# Patient Record
Sex: MALE | NOT HISPANIC OR LATINO | Employment: UNEMPLOYED | URBAN - METROPOLITAN AREA
[De-identification: names, ages, dates, MRNs, and addresses within clinical notes are randomized per-mention and may not be internally consistent; named-entity substitution may affect disease eponyms.]

---

## 2024-10-15 ENCOUNTER — APPOINTMENT (OUTPATIENT)
Dept: RADIOLOGY | Facility: HOSPITAL | Age: 64
End: 2024-10-15
Payer: COMMERCIAL

## 2024-10-15 ENCOUNTER — HOSPITAL ENCOUNTER (EMERGENCY)
Facility: HOSPITAL | Age: 64
Discharge: HOME | End: 2024-10-16
Attending: EMERGENCY MEDICINE
Payer: COMMERCIAL

## 2024-10-15 ENCOUNTER — APPOINTMENT (OUTPATIENT)
Dept: CARDIOLOGY | Facility: HOSPITAL | Age: 64
End: 2024-10-15
Payer: COMMERCIAL

## 2024-10-15 VITALS
DIASTOLIC BLOOD PRESSURE: 86 MMHG | TEMPERATURE: 98.4 F | RESPIRATION RATE: 20 BRPM | HEIGHT: 74 IN | HEART RATE: 96 BPM | SYSTOLIC BLOOD PRESSURE: 148 MMHG | OXYGEN SATURATION: 94 % | BODY MASS INDEX: 43.65 KG/M2

## 2024-10-15 DIAGNOSIS — R56.9 SEIZURE (MULTI): Primary | ICD-10-CM

## 2024-10-15 DIAGNOSIS — G40.919 BREAKTHROUGH SEIZURE (MULTI): ICD-10-CM

## 2024-10-15 LAB
ALBUMIN SERPL BCP-MCNC: 4 G/DL (ref 3.4–5)
ALP SERPL-CCNC: 112 U/L (ref 33–136)
ALT SERPL W P-5'-P-CCNC: 75 U/L (ref 10–52)
ANION GAP SERPL CALC-SCNC: 24 MMOL/L (ref 10–20)
AST SERPL W P-5'-P-CCNC: 51 U/L (ref 9–39)
BASOPHILS # BLD AUTO: 0.03 X10*3/UL (ref 0–0.1)
BASOPHILS NFR BLD AUTO: 0.3 %
BILIRUB SERPL-MCNC: 1.4 MG/DL (ref 0–1.2)
BUN SERPL-MCNC: 6 MG/DL (ref 6–23)
CALCIUM SERPL-MCNC: 8.5 MG/DL (ref 8.6–10.3)
CHLORIDE SERPL-SCNC: 98 MMOL/L (ref 98–107)
CO2 SERPL-SCNC: 17 MMOL/L (ref 21–32)
CREAT SERPL-MCNC: 0.9 MG/DL (ref 0.5–1.3)
EGFRCR SERPLBLD CKD-EPI 2021: >90 ML/MIN/1.73M*2
EOSINOPHIL # BLD AUTO: 0.03 X10*3/UL (ref 0–0.7)
EOSINOPHIL NFR BLD AUTO: 0.3 %
ERYTHROCYTE [DISTWIDTH] IN BLOOD BY AUTOMATED COUNT: 12 % (ref 11.5–14.5)
ETHANOL SERPL-MCNC: <10 MG/DL
GLUCOSE SERPL-MCNC: 166 MG/DL (ref 74–99)
HCT VFR BLD AUTO: 43.9 % (ref 41–52)
HGB BLD-MCNC: 15.4 G/DL (ref 13.5–17.5)
HOLD SPECIMEN: NORMAL
IMM GRANULOCYTES # BLD AUTO: 0.02 X10*3/UL (ref 0–0.7)
IMM GRANULOCYTES NFR BLD AUTO: 0.2 % (ref 0–0.9)
INR PPP: 1.2 (ref 0.9–1.1)
LACTATE SERPL-SCNC: 2.3 MMOL/L (ref 0.4–2)
LACTATE SERPL-SCNC: 8.4 MMOL/L (ref 0.4–2)
LYMPHOCYTES # BLD AUTO: 2.24 X10*3/UL (ref 1.2–4.8)
LYMPHOCYTES NFR BLD AUTO: 23.4 %
MAGNESIUM SERPL-MCNC: 1.42 MG/DL (ref 1.6–2.4)
MCH RBC QN AUTO: 31.2 PG (ref 26–34)
MCHC RBC AUTO-ENTMCNC: 35.1 G/DL (ref 32–36)
MCV RBC AUTO: 89 FL (ref 80–100)
MONOCYTES # BLD AUTO: 0.48 X10*3/UL (ref 0.1–1)
MONOCYTES NFR BLD AUTO: 5 %
NEUTROPHILS # BLD AUTO: 6.77 X10*3/UL (ref 1.2–7.7)
NEUTROPHILS NFR BLD AUTO: 70.8 %
NRBC BLD-RTO: 0 /100 WBCS (ref 0–0)
PLATELET # BLD AUTO: 228 X10*3/UL (ref 150–450)
POTASSIUM SERPL-SCNC: 2.6 MMOL/L (ref 3.5–5.3)
PROT SERPL-MCNC: 7.2 G/DL (ref 6.4–8.2)
PROTHROMBIN TIME: 14 SECONDS (ref 9.8–12.8)
RBC # BLD AUTO: 4.94 X10*6/UL (ref 4.5–5.9)
SODIUM SERPL-SCNC: 136 MMOL/L (ref 136–145)
WBC # BLD AUTO: 9.6 X10*3/UL (ref 4.4–11.3)

## 2024-10-15 PROCEDURE — 2500000002 HC RX 250 W HCPCS SELF ADMINISTERED DRUGS (ALT 637 FOR MEDICARE OP, ALT 636 FOR OP/ED): Performed by: EMERGENCY MEDICINE

## 2024-10-15 PROCEDURE — 85025 COMPLETE CBC W/AUTO DIFF WBC: CPT | Performed by: EMERGENCY MEDICINE

## 2024-10-15 PROCEDURE — 70450 CT HEAD/BRAIN W/O DYE: CPT | Performed by: RADIOLOGY

## 2024-10-15 PROCEDURE — 93005 ELECTROCARDIOGRAM TRACING: CPT

## 2024-10-15 PROCEDURE — 71045 X-RAY EXAM CHEST 1 VIEW: CPT | Performed by: STUDENT IN AN ORGANIZED HEALTH CARE EDUCATION/TRAINING PROGRAM

## 2024-10-15 PROCEDURE — 36415 COLL VENOUS BLD VENIPUNCTURE: CPT | Performed by: EMERGENCY MEDICINE

## 2024-10-15 PROCEDURE — 71045 X-RAY EXAM CHEST 1 VIEW: CPT

## 2024-10-15 PROCEDURE — 96365 THER/PROPH/DIAG IV INF INIT: CPT

## 2024-10-15 PROCEDURE — 2500000004 HC RX 250 GENERAL PHARMACY W/ HCPCS (ALT 636 FOR OP/ED): Performed by: EMERGENCY MEDICINE

## 2024-10-15 PROCEDURE — 96368 THER/DIAG CONCURRENT INF: CPT

## 2024-10-15 PROCEDURE — 96366 THER/PROPH/DIAG IV INF ADDON: CPT

## 2024-10-15 PROCEDURE — 82077 ASSAY SPEC XCP UR&BREATH IA: CPT | Performed by: EMERGENCY MEDICINE

## 2024-10-15 PROCEDURE — 80053 COMPREHEN METABOLIC PANEL: CPT | Performed by: EMERGENCY MEDICINE

## 2024-10-15 PROCEDURE — 83735 ASSAY OF MAGNESIUM: CPT | Performed by: EMERGENCY MEDICINE

## 2024-10-15 PROCEDURE — 83605 ASSAY OF LACTIC ACID: CPT | Performed by: EMERGENCY MEDICINE

## 2024-10-15 PROCEDURE — 99285 EMERGENCY DEPT VISIT HI MDM: CPT

## 2024-10-15 PROCEDURE — 85610 PROTHROMBIN TIME: CPT | Performed by: EMERGENCY MEDICINE

## 2024-10-15 PROCEDURE — 70450 CT HEAD/BRAIN W/O DYE: CPT

## 2024-10-15 RX ORDER — POTASSIUM CHLORIDE 14.9 MG/ML
20 INJECTION INTRAVENOUS ONCE
Status: COMPLETED | OUTPATIENT
Start: 2024-10-15 | End: 2024-10-16

## 2024-10-15 RX ORDER — MAGNESIUM SULFATE HEPTAHYDRATE 40 MG/ML
2 INJECTION, SOLUTION INTRAVENOUS ONCE
Status: COMPLETED | OUTPATIENT
Start: 2024-10-15 | End: 2024-10-15

## 2024-10-15 RX ORDER — POTASSIUM CHLORIDE 20 MEQ/1
40 TABLET, EXTENDED RELEASE ORAL ONCE
Status: COMPLETED | OUTPATIENT
Start: 2024-10-15 | End: 2024-10-15

## 2024-10-15 ASSESSMENT — LIFESTYLE VARIABLES
TOTAL SCORE: 4
HAVE PEOPLE ANNOYED YOU BY CRITICIZING YOUR DRINKING: YES
EVER FELT BAD OR GUILTY ABOUT YOUR DRINKING: YES
HAVE YOU EVER FELT YOU SHOULD CUT DOWN ON YOUR DRINKING: YES
EVER HAD A DRINK FIRST THING IN THE MORNING TO STEADY YOUR NERVES TO GET RID OF A HANGOVER: YES

## 2024-10-15 ASSESSMENT — PAIN SCALES - GENERAL
PAINLEVEL_OUTOF10: 0 - NO PAIN
PAINLEVEL_OUTOF10: 0 - NO PAIN

## 2024-10-15 ASSESSMENT — PAIN - FUNCTIONAL ASSESSMENT: PAIN_FUNCTIONAL_ASSESSMENT: 0-10

## 2024-10-16 LAB
ATRIAL RATE: 246 BPM
P AXIS: 56 DEGREES
PR INTERVAL: 177 MS
Q ONSET: 252 MS
QRS COUNT: 16 BEATS
QRS DURATION: 111 MS
QT INTERVAL: 363 MS
QTC CALCULATION(BAZETT): 464 MS
QTC FREDERICIA: 427 MS
R AXIS: 83 DEGREES
T AXIS: -87 DEGREES
T OFFSET: 433 MS
VENTRICULAR RATE: 98 BPM

## 2024-10-16 PROCEDURE — 96366 THER/PROPH/DIAG IV INF ADDON: CPT

## 2024-10-16 NOTE — ED PROVIDER NOTES
HPI   Chief Complaint   Patient presents with    Seizures       64-year-old male who presents by squad for seizure.  Patient has a known history of seizures on Keppra also history of alcohol abuse.  According the squad he was clean for some time but started drinking again over the past week.  Seizure was witnessed by family who called the squad.  Patient states he has been taking his Keppra as prescribed.  He denies any alcohol use.  Denies any drug use.              Patient History   No past medical history on file.  No past surgical history on file.  No family history on file.  Social History     Tobacco Use    Smoking status: Not on file    Smokeless tobacco: Not on file   Substance Use Topics    Alcohol use: Not on file    Drug use: Not on file       Physical Exam   ED Triage Vitals [10/15/24 2021]   Temperature Heart Rate Respirations BP   36.9 °C (98.4 °F) (!) 109 (!) 22 150/85      Pulse Ox Temp src Heart Rate Source Patient Position   97 % -- -- --      BP Location FiO2 (%)     -- --       Physical Exam  Constitutional:       Appearance: Normal appearance. He is normal weight.   HENT:      Head: Normocephalic and atraumatic.      Nose: Nose normal.      Mouth/Throat:      Mouth: Mucous membranes are moist.      Pharynx: Oropharynx is clear.   Eyes:      Extraocular Movements: Extraocular movements intact.      Conjunctiva/sclera: Conjunctivae normal.      Pupils: Pupils are equal, round, and reactive to light.   Cardiovascular:      Rate and Rhythm: Normal rate and regular rhythm.   Pulmonary:      Effort: Pulmonary effort is normal.      Breath sounds: Normal breath sounds.   Abdominal:      General: Abdomen is flat. Bowel sounds are normal.      Palpations: Abdomen is soft.   Musculoskeletal:         General: Normal range of motion.      Cervical back: Normal range of motion and neck supple.   Skin:     General: Skin is warm and dry.      Capillary Refill: Capillary refill takes less than 2 seconds.    Neurological:      General: No focal deficit present.      Mental Status: He is alert. He is disoriented.   Psychiatric:         Mood and Affect: Mood normal.         Behavior: Behavior normal.         Thought Content: Thought content normal.         Judgment: Judgment normal.       Labs Reviewed   COMPREHENSIVE METABOLIC PANEL - Abnormal       Result Value    Glucose 166 (*)     Sodium 136      Potassium 2.6 (*)     Chloride 98      Bicarbonate 17 (*)     Anion Gap 24 (*)     Urea Nitrogen 6      Creatinine 0.90      eGFR >90      Calcium 8.5 (*)     Albumin 4.0      Alkaline Phosphatase 112      Total Protein 7.2      AST 51 (*)     Bilirubin, Total 1.4 (*)     ALT 75 (*)    MAGNESIUM - Abnormal    Magnesium 1.42 (*)    LACTATE - Abnormal    Lactate 8.4 (*)     Narrative:     Venipuncture immediately after or during the administration of Metamizole may lead to falsely low results. Testing should be performed immediately prior to Metamizole dosing.   PROTIME-INR - Abnormal    Protime 14.0 (*)     INR 1.2 (*)    LACTATE - Abnormal    Lactate 2.3 (*)     Narrative:     Venipuncture immediately after or during the administration of Metamizole may lead to falsely low results. Testing should be performed immediately prior to Metamizole dosing.   ALCOHOL - Normal    Alcohol <10     CBC WITH AUTO DIFFERENTIAL    WBC 9.6      nRBC 0.0      RBC 4.94      Hemoglobin 15.4      Hematocrit 43.9      MCV 89      MCH 31.2      MCHC 35.1      RDW 12.0      Platelets 228      Neutrophils % 70.8      Immature Granulocytes %, Automated 0.2      Lymphocytes % 23.4      Monocytes % 5.0      Eosinophils % 0.3      Basophils % 0.3      Neutrophils Absolute 6.77      Immature Granulocytes Absolute, Automated 0.02      Lymphocytes Absolute 2.24      Monocytes Absolute 0.48      Eosinophils Absolute 0.03      Basophils Absolute 0.03     URINALYSIS WITH REFLEX CULTURE AND MICROSCOPIC    Narrative:     The following orders were created for  panel order Urinalysis with Reflex Culture and Microscopic.  Procedure                               Abnormality         Status                     ---------                               -----------         ------                     Urinalysis with Reflex C...[[id number]]                                                 Extra Urine Gray Tube[[id number]]                                                         Please view results for these tests on the individual orders.   URINALYSIS WITH REFLEX CULTURE AND MICROSCOPIC   EXTRA URINE GRAY TUBE       CT head wo IV contrast   Final Result   No acute intracranial abnormality.        Chronic changes as noted above.        MACRO:   None        Signed by: Aysha Paulson 10/15/2024 10:51 PM   Dictation workstation:   [id number]      XR chest 1 view   Final Result   1.  Mild pulmonary vascular congestion without consolidation or   pleural effusion.                  MACRO:   None        Signed by: Radha Ozuna 10/15/2024 9:23 PM   Dictation workstation:   [id number]              ED Course & MDM   Diagnoses as of 10/15/24 2358   Seizure (Multi)   Breakthrough seizure (Multi)                 No data recorded     Lilibeth Coma Scale Score: 15 (10/15/24 2349 : Mary Woodward RN)                           Medical Decision Making  Emergency department course, EKG was obtained showed A-fib at a rate of 98 patient does have a history of A-fib.  Laboratory studies were obtained and reviewed patient low potassium at 2.6 with a low magnesium.  Initial lactate was 8 after IV fluids repeat lactate is 2.3.  Potassium and magnesium was replaced IV.  CT of the head shows no acute intercranial findings.  Chest x-ray shows some mild vascular congestion.  Patient is neurologically intact.  He states he has been taking his Keppra as prescribed.  I feel comfortable discharging the patient home.  Patient is agreeable with this plan.    Amount and/or Complexity of Data  Reviewed  ECG/medicine tests: independent interpretation performed.     Details: EKG shows A-fib at a rate of 98 with nonspecific ST-T wave changes, interpreted by ED physician.        Procedure  Procedures     Romina Cormire DO  10/15/24 6601

## 2024-10-16 NOTE — ED TRIAGE NOTES
Witnessed seizure. Arrives awake and oriented times 3 but is unable to say what happened and is not able to answer all questions. Still has some confusion. Per report he had been sober but started to drink about a week ago . Unable to say when last drink was.

## 2025-04-15 ENCOUNTER — APPOINTMENT (OUTPATIENT)
Dept: CARDIOLOGY | Facility: HOSPITAL | Age: 65
End: 2025-04-15
Payer: COMMERCIAL

## 2025-04-15 ENCOUNTER — APPOINTMENT (OUTPATIENT)
Dept: RADIOLOGY | Facility: HOSPITAL | Age: 65
End: 2025-04-15
Payer: COMMERCIAL

## 2025-04-15 ENCOUNTER — DOCUMENTATION (OUTPATIENT)
Dept: CRITICAL CARE MEDICINE | Facility: HOSPITAL | Age: 65
End: 2025-04-15

## 2025-04-15 ENCOUNTER — HOSPITAL ENCOUNTER (INPATIENT)
Facility: HOSPITAL | Age: 65
LOS: 2 days | Discharge: ANOTHER HOSPITAL | End: 2025-04-17
Attending: EMERGENCY MEDICINE | Admitting: INTERNAL MEDICINE
Payer: COMMERCIAL

## 2025-04-15 DIAGNOSIS — I48.91 UNSPECIFIED ATRIAL FIBRILLATION (MULTI): ICD-10-CM

## 2025-04-15 DIAGNOSIS — R56.9 SEIZURE (MULTI): Primary | ICD-10-CM

## 2025-04-15 DIAGNOSIS — I47.20 VENTRICULAR TACHYCARDIA, UNSPECIFIED (MULTI): ICD-10-CM

## 2025-04-15 DIAGNOSIS — R45.1 AGITATION: ICD-10-CM

## 2025-04-15 PROBLEM — F10.90 ALCOHOL USE DISORDER: Status: ACTIVE | Noted: 2025-04-15

## 2025-04-15 PROBLEM — E66.01 MORBID OBESITY WITH BMI OF 40.0-44.9, ADULT (MULTI): Status: ACTIVE | Noted: 2025-04-15

## 2025-04-15 PROBLEM — I10 HTN (HYPERTENSION): Chronic | Status: ACTIVE | Noted: 2025-04-15

## 2025-04-15 PROBLEM — F32.A DEPRESSION, UNSPECIFIED: Status: ACTIVE | Noted: 2025-04-15

## 2025-04-15 PROBLEM — G89.29 CHRONIC LOWER BACK PAIN: Status: ACTIVE | Noted: 2025-04-15

## 2025-04-15 PROBLEM — I48.19 ATRIAL FIBRILLATION, PERSISTENT (MULTI): Status: ACTIVE | Noted: 2025-04-15

## 2025-04-15 PROBLEM — Z87.898 HISTORY OF PREDIABETES: Chronic | Status: ACTIVE | Noted: 2025-04-15

## 2025-04-15 PROBLEM — M54.50 CHRONIC LOWER BACK PAIN: Status: ACTIVE | Noted: 2025-04-15

## 2025-04-15 PROBLEM — Z72.0 NICOTINE USE: Chronic | Status: ACTIVE | Noted: 2025-04-15

## 2025-04-15 PROBLEM — G47.33 OSA (OBSTRUCTIVE SLEEP APNEA): Status: ACTIVE | Noted: 2025-04-15

## 2025-04-15 PROBLEM — F41.1 GENERALIZED ANXIETY DISORDER: Chronic | Status: ACTIVE | Noted: 2025-04-15

## 2025-04-15 PROBLEM — F10.931 ALCOHOL WITHDRAWAL DELIRIUM, ACUTE, HYPERACTIVE (MULTI): Status: ACTIVE | Noted: 2025-04-15

## 2025-04-15 PROBLEM — E78.5 HLD (HYPERLIPIDEMIA): Status: ACTIVE | Noted: 2025-04-15

## 2025-04-15 LAB
ALBUMIN SERPL BCP-MCNC: 3.9 G/DL (ref 3.4–5)
ALBUMIN SERPL BCP-MCNC: 3.9 G/DL (ref 3.4–5)
ALP SERPL-CCNC: 142 U/L (ref 33–136)
ALP SERPL-CCNC: 143 U/L (ref 33–136)
ALT SERPL W P-5'-P-CCNC: 20 U/L (ref 10–52)
ALT SERPL W P-5'-P-CCNC: 21 U/L (ref 10–52)
AMPHETAMINES UR QL SCN: ABNORMAL
ANION GAP BLDA CALCULATED.4IONS-SCNC: 10 MMO/L (ref 10–25)
ANION GAP BLDA CALCULATED.4IONS-SCNC: 15 MMO/L (ref 10–25)
ANION GAP SERPL CALC-SCNC: 16 MMOL/L (ref 10–20)
ANION GAP SERPL CALC-SCNC: 18 MMOL/L (ref 10–20)
AORTIC VALVE PEAK VELOCITY: 1.41 M/S
APPEARANCE UR: CLEAR
APTT PPP: 35 SECONDS (ref 26–36)
ARTERIAL PATENCY WRIST A: POSITIVE
ARTERIAL PATENCY WRIST A: POSITIVE
AST SERPL W P-5'-P-CCNC: 31 U/L (ref 9–39)
AST SERPL W P-5'-P-CCNC: 38 U/L (ref 9–39)
AV PEAK GRADIENT: 8 MMHG
AVA (PEAK VEL): 3.24 CM2
BARBITURATES UR QL SCN: ABNORMAL
BASE EXCESS BLDA CALC-SCNC: -1.9 MMOL/L (ref -2–3)
BASE EXCESS BLDA CALC-SCNC: 1.6 MMOL/L (ref -2–3)
BASOPHILS # BLD AUTO: 0.02 X10*3/UL (ref 0–0.1)
BASOPHILS NFR BLD AUTO: 0.3 %
BENZODIAZ UR QL SCN: ABNORMAL
BILIRUB SERPL-MCNC: 0.8 MG/DL (ref 0–1.2)
BILIRUB SERPL-MCNC: 1 MG/DL (ref 0–1.2)
BILIRUB UR STRIP.AUTO-MCNC: NEGATIVE MG/DL
BNP SERPL-MCNC: 159 PG/ML (ref 0–99)
BODY TEMPERATURE: 37 DEGREES CELSIUS
BODY TEMPERATURE: 37 DEGREES CELSIUS
BUN SERPL-MCNC: 5 MG/DL (ref 6–23)
BUN SERPL-MCNC: 7 MG/DL (ref 6–23)
BZE UR QL SCN: ABNORMAL
CA-I BLDA-SCNC: 1.03 MMOL/L (ref 1.1–1.33)
CA-I BLDA-SCNC: 1.08 MMOL/L (ref 1.1–1.33)
CALCIUM SERPL-MCNC: 8.5 MG/DL (ref 8.6–10.3)
CALCIUM SERPL-MCNC: 8.5 MG/DL (ref 8.6–10.3)
CANNABINOIDS UR QL SCN: ABNORMAL
CARDIAC TROPONIN I PNL SERPL HS: 241 NG/L (ref 0–20)
CARDIAC TROPONIN I PNL SERPL HS: 290 NG/L (ref 0–20)
CHLORIDE BLDA-SCNC: 95 MMOL/L (ref 98–107)
CHLORIDE BLDA-SCNC: 97 MMOL/L (ref 98–107)
CHLORIDE SERPL-SCNC: 94 MMOL/L (ref 98–107)
CHLORIDE SERPL-SCNC: 95 MMOL/L (ref 98–107)
CO2 SERPL-SCNC: 26 MMOL/L (ref 21–32)
CO2 SERPL-SCNC: 27 MMOL/L (ref 21–32)
COLOR UR: ABNORMAL
CREAT SERPL-MCNC: 0.6 MG/DL (ref 0.5–1.3)
CREAT SERPL-MCNC: 1.23 MG/DL (ref 0.5–1.3)
CRITICAL CALL TIME: 445
CRITICAL CALLED BY: ABNORMAL
CRITICAL CALLED TO: ABNORMAL
CRITICAL READ BACK: ABNORMAL
EGFRCR SERPLBLD CKD-EPI 2021: 66 ML/MIN/1.73M*2
EGFRCR SERPLBLD CKD-EPI 2021: >90 ML/MIN/1.73M*2
EJECTION FRACTION APICAL 4 CHAMBER: 64.6
EJECTION FRACTION: 53 %
EOSINOPHIL # BLD AUTO: 0 X10*3/UL (ref 0–0.7)
EOSINOPHIL NFR BLD AUTO: 0 %
ERYTHROCYTE [DISTWIDTH] IN BLOOD BY AUTOMATED COUNT: 14.9 % (ref 11.5–14.5)
ETHANOL SERPL-MCNC: <10 MG/DL
FENTANYL+NORFENTANYL UR QL SCN: ABNORMAL
FLOW: 60 LPM
FLUAV RNA RESP QL NAA+PROBE: NOT DETECTED
FLUBV RNA RESP QL NAA+PROBE: NOT DETECTED
FREQUENCY (BPM): 20 BPM
FREQUENCY (BPM): 20 BPM
GLUCOSE BLD MANUAL STRIP-MCNC: 200 MG/DL (ref 74–99)
GLUCOSE BLDA-MCNC: 169 MG/DL (ref 74–99)
GLUCOSE BLDA-MCNC: 229 MG/DL (ref 74–99)
GLUCOSE SERPL-MCNC: 125 MG/DL (ref 74–99)
GLUCOSE SERPL-MCNC: 139 MG/DL (ref 74–99)
GLUCOSE UR STRIP.AUTO-MCNC: NORMAL MG/DL
HCO3 BLDA-SCNC: 27.2 MMOL/L (ref 22–26)
HCO3 BLDA-SCNC: 29.5 MMOL/L (ref 22–26)
HCT VFR BLD AUTO: 41.9 % (ref 41–52)
HCT VFR BLD EST: 42 % (ref 41–52)
HCT VFR BLD EST: 43 % (ref 41–52)
HGB BLD-MCNC: 13.6 G/DL (ref 13.5–17.5)
HGB BLDA-MCNC: 14 G/DL (ref 13.5–17.5)
HGB BLDA-MCNC: 14.4 G/DL (ref 13.5–17.5)
HOLD SPECIMEN: NORMAL
IMM GRANULOCYTES # BLD AUTO: 0.02 X10*3/UL (ref 0–0.7)
IMM GRANULOCYTES NFR BLD AUTO: 0.3 % (ref 0–0.9)
INHALED O2 CONCENTRATION: 100 %
INHALED O2 CONCENTRATION: 100 %
INR PPP: 1.4 (ref 0.9–1.1)
KETONES UR STRIP.AUTO-MCNC: ABNORMAL MG/DL
LACTATE BLDA-SCNC: 2 MMOL/L (ref 0.4–2)
LACTATE BLDA-SCNC: 7.6 MMOL/L (ref 0.4–2)
LACTATE SERPL-SCNC: 2.7 MMOL/L (ref 0.4–2)
LACTATE SERPL-SCNC: 3.1 MMOL/L (ref 0.4–2)
LEFT ATRIUM VOLUME AREA LENGTH INDEX BSA: 20 ML/M2
LEFT VENTRICLE INTERNAL DIMENSION DIASTOLE: 3.81 CM (ref 3.5–6)
LEFT VENTRICULAR OUTFLOW TRACT DIAMETER: 2.3 CM
LEUKOCYTE ESTERASE UR QL STRIP.AUTO: NEGATIVE
LEVETIRACETAM SERPL-MCNC: 37 UG/ML (ref 10–40)
LEVETIRACETAM SERPL-MCNC: 78 UG/ML (ref 10–40)
LYMPHOCYTES # BLD AUTO: 0.67 X10*3/UL (ref 1.2–4.8)
LYMPHOCYTES NFR BLD AUTO: 11.7 %
MAGNESIUM SERPL-MCNC: 1.73 MG/DL (ref 1.6–2.4)
MCH RBC QN AUTO: 29.5 PG (ref 26–34)
MCHC RBC AUTO-ENTMCNC: 32.5 G/DL (ref 32–36)
MCV RBC AUTO: 91 FL (ref 80–100)
METHADONE UR QL SCN: ABNORMAL
MONOCYTES # BLD AUTO: 0.25 X10*3/UL (ref 0.1–1)
MONOCYTES NFR BLD AUTO: 4.4 %
MUCOUS THREADS #/AREA URNS AUTO: NORMAL /LPF
NEUTROPHILS # BLD AUTO: 4.76 X10*3/UL (ref 1.2–7.7)
NEUTROPHILS NFR BLD AUTO: 83.3 %
NITRITE UR QL STRIP.AUTO: NEGATIVE
NRBC BLD-RTO: 0.3 /100 WBCS (ref 0–0)
OPIATES UR QL SCN: ABNORMAL
OXYCODONE+OXYMORPHONE UR QL SCN: ABNORMAL
OXYHGB MFR BLDA: 94.9 % (ref 94–98)
OXYHGB MFR BLDA: 96.8 % (ref 94–98)
PCO2 BLDA: 60 MM HG (ref 38–42)
PCO2 BLDA: 65 MM HG (ref 38–42)
PCP UR QL SCN: ABNORMAL
PEAK PRESSURE: 23 CM H2O
PEEP CMH2O: 8 CM H2O
PEEP CMH2O: 8 CM H2O
PH BLDA: 7.23 PH (ref 7.38–7.42)
PH BLDA: 7.3 PH (ref 7.38–7.42)
PH UR STRIP.AUTO: 8 [PH]
PLATELET # BLD AUTO: 213 X10*3/UL (ref 150–450)
PO2 BLDA: 117 MM HG (ref 85–95)
PO2 BLDA: 94 MM HG (ref 85–95)
POTASSIUM BLDA-SCNC: 3.4 MMOL/L (ref 3.5–5.3)
POTASSIUM BLDA-SCNC: 3.6 MMOL/L (ref 3.5–5.3)
POTASSIUM SERPL-SCNC: 3.1 MMOL/L (ref 3.5–5.3)
POTASSIUM SERPL-SCNC: 3.6 MMOL/L (ref 3.5–5.3)
PROT SERPL-MCNC: 7.1 G/DL (ref 6.4–8.2)
PROT SERPL-MCNC: 7.8 G/DL (ref 6.4–8.2)
PROT UR STRIP.AUTO-MCNC: ABNORMAL MG/DL
PROTHROMBIN TIME: 16 SECONDS (ref 9.8–12.4)
RBC # BLD AUTO: 4.61 X10*6/UL (ref 4.5–5.9)
RBC # UR STRIP.AUTO: NEGATIVE MG/DL
RBC #/AREA URNS AUTO: NORMAL /HPF
RIGHT VENTRICLE FREE WALL PEAK S': 13.3 CM/S
SAO2 % BLDA: 100 % (ref 94–100)
SAO2 % BLDA: 98 % (ref 94–100)
SARS-COV-2 RNA RESP QL NAA+PROBE: NOT DETECTED
SODIUM BLDA-SCNC: 133 MMOL/L (ref 136–145)
SODIUM BLDA-SCNC: 134 MMOL/L (ref 136–145)
SODIUM SERPL-SCNC: 134 MMOL/L (ref 136–145)
SODIUM SERPL-SCNC: 135 MMOL/L (ref 136–145)
SP GR UR STRIP.AUTO: 1.01
SPECIMEN DRAWN FROM PATIENT: ABNORMAL
SPECIMEN DRAWN FROM PATIENT: ABNORMAL
SPONTANEOUS TIDAL VOLUME: 500 ML
SPONTANEOUS TIDAL VOLUME: 500 ML
TIDAL VOLUME: 500 ML
TIDAL VOLUME: 500 ML
TOTAL MINUTE VOLUME: 11.4 LITER
TOTAL MINUTE VOLUME: 9.7 LITER
TRICUSPID ANNULAR PLANE SYSTOLIC EXCURSION: 2.1 CM
UFH PPP CHRO-ACNC: 0.6 IU/ML
UFH PPP CHRO-ACNC: 0.6 IU/ML
UFH PPP CHRO-ACNC: 0.9 IU/ML
UROBILINOGEN UR STRIP.AUTO-MCNC: NORMAL MG/DL
VENTILATOR MODE: ABNORMAL
VENTILATOR MODE: ABNORMAL
VENTILATOR RATE: 18 BPM
VENTILATOR RATE: 18 BPM
WBC # BLD AUTO: 5.7 X10*3/UL (ref 4.4–11.3)
WBC #/AREA URNS AUTO: NORMAL /HPF

## 2025-04-15 PROCEDURE — 94799 UNLISTED PULMONARY SVC/PX: CPT

## 2025-04-15 PROCEDURE — 2500000004 HC RX 250 GENERAL PHARMACY W/ HCPCS (ALT 636 FOR OP/ED): Performed by: EMERGENCY MEDICINE

## 2025-04-15 PROCEDURE — 80053 COMPREHEN METABOLIC PANEL: CPT | Performed by: EMERGENCY MEDICINE

## 2025-04-15 PROCEDURE — 99285 EMERGENCY DEPT VISIT HI MDM: CPT | Mod: 25 | Performed by: EMERGENCY MEDICINE

## 2025-04-15 PROCEDURE — 71045 X-RAY EXAM CHEST 1 VIEW: CPT | Mod: FOREIGN READ | Performed by: RADIOLOGY

## 2025-04-15 PROCEDURE — 87081 CULTURE SCREEN ONLY: CPT | Mod: PARLAB

## 2025-04-15 PROCEDURE — 2500000004 HC RX 250 GENERAL PHARMACY W/ HCPCS (ALT 636 FOR OP/ED)

## 2025-04-15 PROCEDURE — 2500000005 HC RX 250 GENERAL PHARMACY W/O HCPCS: Performed by: EMERGENCY MEDICINE

## 2025-04-15 PROCEDURE — 84484 ASSAY OF TROPONIN QUANT: CPT | Performed by: INTERNAL MEDICINE

## 2025-04-15 PROCEDURE — 82077 ASSAY SPEC XCP UR&BREATH IA: CPT | Performed by: EMERGENCY MEDICINE

## 2025-04-15 PROCEDURE — 2500000001 HC RX 250 WO HCPCS SELF ADMINISTERED DRUGS (ALT 637 FOR MEDICARE OP): Performed by: EMERGENCY MEDICINE

## 2025-04-15 PROCEDURE — 85730 THROMBOPLASTIN TIME PARTIAL: CPT

## 2025-04-15 PROCEDURE — 87636 SARSCOV2 & INF A&B AMP PRB: CPT | Performed by: INTERNAL MEDICINE

## 2025-04-15 PROCEDURE — 31500 INSERT EMERGENCY AIRWAY: CPT

## 2025-04-15 PROCEDURE — 96372 THER/PROPH/DIAG INJ SC/IM: CPT

## 2025-04-15 PROCEDURE — 85520 HEPARIN ASSAY: CPT | Performed by: INTERNAL MEDICINE

## 2025-04-15 PROCEDURE — 70450 CT HEAD/BRAIN W/O DYE: CPT | Performed by: RADIOLOGY

## 2025-04-15 PROCEDURE — 85025 COMPLETE CBC W/AUTO DIFF WBC: CPT | Performed by: EMERGENCY MEDICINE

## 2025-04-15 PROCEDURE — 2500000004 HC RX 250 GENERAL PHARMACY W/ HCPCS (ALT 636 FOR OP/ED): Performed by: STUDENT IN AN ORGANIZED HEALTH CARE EDUCATION/TRAINING PROGRAM

## 2025-04-15 PROCEDURE — 83735 ASSAY OF MAGNESIUM: CPT

## 2025-04-15 PROCEDURE — 5A1945Z RESPIRATORY VENTILATION, 24-96 CONSECUTIVE HOURS: ICD-10-PCS | Performed by: INTERNAL MEDICINE

## 2025-04-15 PROCEDURE — 36415 COLL VENOUS BLD VENIPUNCTURE: CPT | Performed by: EMERGENCY MEDICINE

## 2025-04-15 PROCEDURE — 2500000005 HC RX 250 GENERAL PHARMACY W/O HCPCS

## 2025-04-15 PROCEDURE — 80307 DRUG TEST PRSMV CHEM ANLYZR: CPT | Performed by: EMERGENCY MEDICINE

## 2025-04-15 PROCEDURE — 36600 WITHDRAWAL OF ARTERIAL BLOOD: CPT

## 2025-04-15 PROCEDURE — 96375 TX/PRO/DX INJ NEW DRUG ADDON: CPT | Mod: 59

## 2025-04-15 PROCEDURE — 82435 ASSAY OF BLOOD CHLORIDE: CPT

## 2025-04-15 PROCEDURE — 80177 DRUG SCRN QUAN LEVETIRACETAM: CPT | Mod: PARLAB | Performed by: EMERGENCY MEDICINE

## 2025-04-15 PROCEDURE — 81003 URINALYSIS AUTO W/O SCOPE: CPT

## 2025-04-15 PROCEDURE — 93306 TTE W/DOPPLER COMPLETE: CPT | Performed by: STUDENT IN AN ORGANIZED HEALTH CARE EDUCATION/TRAINING PROGRAM

## 2025-04-15 PROCEDURE — 87040 BLOOD CULTURE FOR BACTERIA: CPT | Mod: PARLAB

## 2025-04-15 PROCEDURE — 2020000001 HC ICU ROOM DAILY

## 2025-04-15 PROCEDURE — 80177 DRUG SCRN QUAN LEVETIRACETAM: CPT | Mod: PARLAB

## 2025-04-15 PROCEDURE — 80349 CANNABINOIDS NATURAL: CPT | Performed by: EMERGENCY MEDICINE

## 2025-04-15 PROCEDURE — 83880 ASSAY OF NATRIURETIC PEPTIDE: CPT | Performed by: INTERNAL MEDICINE

## 2025-04-15 PROCEDURE — C8929 TTE W OR WO FOL WCON,DOPPLER: HCPCS

## 2025-04-15 PROCEDURE — 96374 THER/PROPH/DIAG INJ IV PUSH: CPT | Mod: 59

## 2025-04-15 PROCEDURE — 2500000004 HC RX 250 GENERAL PHARMACY W/ HCPCS (ALT 636 FOR OP/ED): Performed by: INTERNAL MEDICINE

## 2025-04-15 PROCEDURE — 71045 X-RAY EXAM CHEST 1 VIEW: CPT

## 2025-04-15 PROCEDURE — 94002 VENT MGMT INPAT INIT DAY: CPT | Mod: CCI

## 2025-04-15 PROCEDURE — 84075 ASSAY ALKALINE PHOSPHATASE: CPT

## 2025-04-15 PROCEDURE — 93005 ELECTROCARDIOGRAM TRACING: CPT

## 2025-04-15 PROCEDURE — 0BH17EZ INSERTION OF ENDOTRACHEAL AIRWAY INTO TRACHEA, VIA NATURAL OR ARTIFICIAL OPENING: ICD-10-PCS | Performed by: INTERNAL MEDICINE

## 2025-04-15 PROCEDURE — 85610 PROTHROMBIN TIME: CPT | Performed by: EMERGENCY MEDICINE

## 2025-04-15 PROCEDURE — 99291 CRITICAL CARE FIRST HOUR: CPT | Performed by: INTERNAL MEDICINE

## 2025-04-15 PROCEDURE — 70450 CT HEAD/BRAIN W/O DYE: CPT

## 2025-04-15 PROCEDURE — 83605 ASSAY OF LACTIC ACID: CPT | Performed by: EMERGENCY MEDICINE

## 2025-04-15 PROCEDURE — 93010 ELECTROCARDIOGRAM REPORT: CPT | Performed by: STUDENT IN AN ORGANIZED HEALTH CARE EDUCATION/TRAINING PROGRAM

## 2025-04-15 PROCEDURE — 82947 ASSAY GLUCOSE BLOOD QUANT: CPT

## 2025-04-15 PROCEDURE — 99223 1ST HOSP IP/OBS HIGH 75: CPT | Performed by: STUDENT IN AN ORGANIZED HEALTH CARE EDUCATION/TRAINING PROGRAM

## 2025-04-15 PROCEDURE — 99291 CRITICAL CARE FIRST HOUR: CPT

## 2025-04-15 RX ORDER — MULTIVIT-MIN/FERROUS GLUCONATE 9 MG/15 ML
15 LIQUID (ML) ORAL DAILY
Status: DISCONTINUED | OUTPATIENT
Start: 2025-04-16 | End: 2025-04-16

## 2025-04-15 RX ORDER — HALOPERIDOL LACTATE 5 MG/ML
5 INJECTION, SOLUTION INTRAMUSCULAR ONCE
Status: COMPLETED | OUTPATIENT
Start: 2025-04-15 | End: 2025-04-15

## 2025-04-15 RX ORDER — FOLIC ACID 1 MG/1
1 TABLET ORAL ONCE
Status: COMPLETED | OUTPATIENT
Start: 2025-04-15 | End: 2025-04-15

## 2025-04-15 RX ORDER — PANTOPRAZOLE SODIUM 40 MG/10ML
40 INJECTION, POWDER, LYOPHILIZED, FOR SOLUTION INTRAVENOUS DAILY
Status: DISCONTINUED | OUTPATIENT
Start: 2025-04-15 | End: 2025-04-17 | Stop reason: HOSPADM

## 2025-04-15 RX ORDER — MULTIVIT-MIN/IRON FUM/FOLIC AC 7.5 MG-4
1 TABLET ORAL DAILY
Status: DISCONTINUED | OUTPATIENT
Start: 2025-04-15 | End: 2025-04-15

## 2025-04-15 RX ORDER — ROCURONIUM BROMIDE 10 MG/ML
INJECTION, SOLUTION INTRAVENOUS CODE/TRAUMA/SEDATION MEDICATION
Status: DISCONTINUED | OUTPATIENT
Start: 2025-04-15 | End: 2025-04-17 | Stop reason: HOSPADM

## 2025-04-15 RX ORDER — THIAMINE HYDROCHLORIDE 100 MG/ML
500 INJECTION, SOLUTION INTRAMUSCULAR; INTRAVENOUS EVERY 8 HOURS SCHEDULED
Status: DISCONTINUED | OUTPATIENT
Start: 2025-04-15 | End: 2025-04-16

## 2025-04-15 RX ORDER — PHENOBARBITAL SODIUM 65 MG/ML
4 INJECTION, SOLUTION INTRAMUSCULAR; INTRAVENOUS ONCE
Status: COMPLETED | OUTPATIENT
Start: 2025-04-15 | End: 2025-04-15

## 2025-04-15 RX ORDER — CEFTRIAXONE 2 G/50ML
2 INJECTION, SOLUTION INTRAVENOUS EVERY 24 HOURS
Status: DISCONTINUED | OUTPATIENT
Start: 2025-04-15 | End: 2025-04-16

## 2025-04-15 RX ORDER — FOLIC ACID 1 MG/1
1 TABLET ORAL DAILY
Status: DISCONTINUED | OUTPATIENT
Start: 2025-04-15 | End: 2025-04-15

## 2025-04-15 RX ORDER — FENTANYL CITRATE-0.9 % NACL/PF 10 MCG/ML
0-200 PLASTIC BAG, INJECTION (ML) INTRAVENOUS CONTINUOUS
Status: DISCONTINUED | OUTPATIENT
Start: 2025-04-15 | End: 2025-04-17 | Stop reason: HOSPADM

## 2025-04-15 RX ORDER — PROPOFOL 10 MG/ML
0-50 INJECTION, EMULSION INTRAVENOUS CONTINUOUS
Status: DISPENSED | OUTPATIENT
Start: 2025-04-15 | End: 2025-04-15

## 2025-04-15 RX ORDER — HALOPERIDOL LACTATE 5 MG/ML
INJECTION, SOLUTION INTRAMUSCULAR
Status: COMPLETED
Start: 2025-04-15 | End: 2025-04-15

## 2025-04-15 RX ORDER — PROPOFOL 10 MG/ML
0-50 INJECTION, EMULSION INTRAVENOUS CONTINUOUS
Status: DISCONTINUED | OUTPATIENT
Start: 2025-04-15 | End: 2025-04-15

## 2025-04-15 RX ORDER — DIPHENHYDRAMINE HYDROCHLORIDE 50 MG/ML
50 INJECTION, SOLUTION INTRAMUSCULAR; INTRAVENOUS ONCE
Status: COMPLETED | OUTPATIENT
Start: 2025-04-15 | End: 2025-04-15

## 2025-04-15 RX ORDER — FENTANYL CITRATE 50 UG/ML
50 INJECTION, SOLUTION INTRAMUSCULAR; INTRAVENOUS ONCE
Status: COMPLETED | OUTPATIENT
Start: 2025-04-15 | End: 2025-04-15

## 2025-04-15 RX ORDER — ASPIRIN 300 MG/1
300 SUPPOSITORY RECTAL DAILY
Status: DISCONTINUED | OUTPATIENT
Start: 2025-04-15 | End: 2025-04-17 | Stop reason: HOSPADM

## 2025-04-15 RX ORDER — LORAZEPAM 2 MG/ML
INJECTION INTRAMUSCULAR
Status: COMPLETED
Start: 2025-04-15 | End: 2025-04-15

## 2025-04-15 RX ORDER — PROPOFOL 10 MG/ML
0-50 INJECTION, EMULSION INTRAVENOUS CONTINUOUS
Status: DISCONTINUED | OUTPATIENT
Start: 2025-04-15 | End: 2025-04-17 | Stop reason: HOSPADM

## 2025-04-15 RX ORDER — NAPROXEN SODIUM 220 MG/1
81 TABLET, FILM COATED ORAL DAILY
Status: DISCONTINUED | OUTPATIENT
Start: 2025-04-15 | End: 2025-04-17

## 2025-04-15 RX ORDER — HEPARIN SODIUM 10000 [USP'U]/100ML
0-4000 INJECTION, SOLUTION INTRAVENOUS CONTINUOUS
Status: DISCONTINUED | OUTPATIENT
Start: 2025-04-15 | End: 2025-04-16

## 2025-04-15 RX ORDER — THIAMINE HYDROCHLORIDE 100 MG/ML
100 INJECTION, SOLUTION INTRAMUSCULAR; INTRAVENOUS DAILY
Status: DISCONTINUED | OUTPATIENT
Start: 2025-04-15 | End: 2025-04-15

## 2025-04-15 RX ORDER — FOLIC ACID 1 MG/1
1 TABLET ORAL DAILY
Status: DISCONTINUED | OUTPATIENT
Start: 2025-04-16 | End: 2025-04-17 | Stop reason: HOSPADM

## 2025-04-15 RX ORDER — PHENOBARBITAL SODIUM 65 MG/ML
65 INJECTION, SOLUTION INTRAMUSCULAR; INTRAVENOUS EVERY 6 HOURS PRN
Status: DISCONTINUED | OUTPATIENT
Start: 2025-04-15 | End: 2025-04-17 | Stop reason: HOSPADM

## 2025-04-15 RX ORDER — METOPROLOL TARTRATE 1 MG/ML
INJECTION, SOLUTION INTRAVENOUS
Status: COMPLETED
Start: 2025-04-15 | End: 2025-04-15

## 2025-04-15 RX ORDER — LEVETIRACETAM 5 MG/ML
500 INJECTION INTRAVASCULAR EVERY 12 HOURS
Status: DISCONTINUED | OUTPATIENT
Start: 2025-04-15 | End: 2025-04-15

## 2025-04-15 RX ORDER — PHENOBARBITAL SODIUM 65 MG/ML
97.5 INJECTION, SOLUTION INTRAMUSCULAR; INTRAVENOUS EVERY 8 HOURS
Status: CANCELLED | OUTPATIENT
Start: 2025-04-15 | End: 2025-04-17

## 2025-04-15 RX ORDER — PHENOBARBITAL SODIUM 65 MG/ML
3 INJECTION, SOLUTION INTRAMUSCULAR; INTRAVENOUS
Status: COMPLETED | OUTPATIENT
Start: 2025-04-15 | End: 2025-04-15

## 2025-04-15 RX ORDER — FOLIC ACID 1 MG/1
1 TABLET ORAL DAILY
Status: DISCONTINUED | OUTPATIENT
Start: 2025-04-15 | End: 2025-04-15 | Stop reason: SDUPTHER

## 2025-04-15 RX ORDER — VANCOMYCIN HYDROCHLORIDE 1 G/20ML
INJECTION, POWDER, LYOPHILIZED, FOR SOLUTION INTRAVENOUS DAILY PRN
Status: DISCONTINUED | OUTPATIENT
Start: 2025-04-15 | End: 2025-04-15

## 2025-04-15 RX ORDER — MULTIVIT-MIN/IRON FUM/FOLIC AC 7.5 MG-4
1 TABLET ORAL ONCE
Status: COMPLETED | OUTPATIENT
Start: 2025-04-15 | End: 2025-04-15

## 2025-04-15 RX ORDER — PHENOBARBITAL SODIUM 65 MG/ML
65 INJECTION, SOLUTION INTRAMUSCULAR; INTRAVENOUS EVERY 8 HOURS
Status: CANCELLED | OUTPATIENT
Start: 2025-04-17 | End: 2025-04-19

## 2025-04-15 RX ORDER — LEVETIRACETAM 10 MG/ML
1000 INJECTION INTRAVASCULAR ONCE
Status: COMPLETED | OUTPATIENT
Start: 2025-04-15 | End: 2025-04-15

## 2025-04-15 RX ORDER — MULTIVIT-MIN/FERROUS GLUCONATE 9 MG/15 ML
15 LIQUID (ML) ORAL DAILY
Status: DISCONTINUED | OUTPATIENT
Start: 2025-04-15 | End: 2025-04-15

## 2025-04-15 RX ORDER — MAGNESIUM SULFATE HEPTAHYDRATE 40 MG/ML
2 INJECTION, SOLUTION INTRAVENOUS ONCE
Status: COMPLETED | OUTPATIENT
Start: 2025-04-15 | End: 2025-04-15

## 2025-04-15 RX ORDER — DIPHENHYDRAMINE HYDROCHLORIDE 50 MG/ML
INJECTION, SOLUTION INTRAMUSCULAR; INTRAVENOUS
Status: COMPLETED
Start: 2025-04-15 | End: 2025-04-15

## 2025-04-15 RX ORDER — METOPROLOL TARTRATE 1 MG/ML
10 INJECTION, SOLUTION INTRAVENOUS ONCE
Status: DISCONTINUED | OUTPATIENT
Start: 2025-04-15 | End: 2025-04-15

## 2025-04-15 RX ORDER — METOPROLOL TARTRATE 1 MG/ML
5 INJECTION, SOLUTION INTRAVENOUS ONCE
Status: COMPLETED | OUTPATIENT
Start: 2025-04-15 | End: 2025-04-15

## 2025-04-15 RX ORDER — LANOLIN ALCOHOL/MO/W.PET/CERES
100 CREAM (GRAM) TOPICAL DAILY
Status: DISCONTINUED | OUTPATIENT
Start: 2025-04-18 | End: 2025-04-15

## 2025-04-15 RX ORDER — POTASSIUM CHLORIDE 14.9 MG/ML
20 INJECTION INTRAVENOUS
Status: COMPLETED | OUTPATIENT
Start: 2025-04-15 | End: 2025-04-15

## 2025-04-15 RX ORDER — THIAMINE HYDROCHLORIDE 100 MG/ML
100 INJECTION, SOLUTION INTRAMUSCULAR; INTRAVENOUS ONCE
Status: COMPLETED | OUTPATIENT
Start: 2025-04-15 | End: 2025-04-15

## 2025-04-15 RX ORDER — PHENOBARBITAL SODIUM 65 MG/ML
32.5 INJECTION, SOLUTION INTRAMUSCULAR; INTRAVENOUS EVERY 8 HOURS
Status: CANCELLED | OUTPATIENT
Start: 2025-04-19 | End: 2025-04-21

## 2025-04-15 RX ORDER — PROPOFOL 10 MG/ML
INJECTION, EMULSION INTRAVENOUS
Status: COMPLETED
Start: 2025-04-15 | End: 2025-04-15

## 2025-04-15 RX ADMIN — PIPERACILLIN SODIUM AND TAZOBACTAM SODIUM 3.38 G: 3; .375 INJECTION, SOLUTION INTRAVENOUS at 08:05

## 2025-04-15 RX ADMIN — HEPARIN SODIUM 1000 UNITS/HR: 10000 INJECTION, SOLUTION INTRAVENOUS at 07:43

## 2025-04-15 RX ADMIN — VANCOMYCIN HYDROCHLORIDE 2000 MG: 10 INJECTION, POWDER, LYOPHILIZED, FOR SOLUTION INTRAVENOUS at 08:13

## 2025-04-15 RX ADMIN — Medication 50 MCG/HR: at 17:43

## 2025-04-15 RX ADMIN — LORAZEPAM 2 MG: 2 INJECTION INTRAMUSCULAR at 03:10

## 2025-04-15 RX ADMIN — PROPOFOL 25 MCG/KG/MIN: 10 INJECTION, EMULSION INTRAVENOUS at 23:56

## 2025-04-15 RX ADMIN — PROPOFOL 100 MG: 10 INJECTION, EMULSION INTRAVENOUS at 04:35

## 2025-04-15 RX ADMIN — FOLIC ACID 1 MG: 1 TABLET ORAL at 02:05

## 2025-04-15 RX ADMIN — MAGNESIUM SULFATE HEPTAHYDRATE 2 G: 40 INJECTION, SOLUTION INTRAVENOUS at 16:14

## 2025-04-15 RX ADMIN — THIAMINE HYDROCHLORIDE 500 MG: 100 INJECTION, SOLUTION INTRAMUSCULAR; INTRAVENOUS at 22:15

## 2025-04-15 RX ADMIN — Medication 25 MCG/HR: at 06:16

## 2025-04-15 RX ADMIN — PANTOPRAZOLE SODIUM 40 MG: 40 INJECTION, POWDER, FOR SOLUTION INTRAVENOUS at 09:25

## 2025-04-15 RX ADMIN — LORAZEPAM 2 MG: 2 INJECTION INTRAMUSCULAR; INTRAVENOUS at 04:34

## 2025-04-15 RX ADMIN — LORAZEPAM 2 MG: 2 INJECTION INTRAMUSCULAR; INTRAVENOUS at 03:10

## 2025-04-15 RX ADMIN — THIAMINE HYDROCHLORIDE 500 MG: 100 INJECTION, SOLUTION INTRAMUSCULAR; INTRAVENOUS at 08:17

## 2025-04-15 RX ADMIN — PROPOFOL 25 MCG/KG/MIN: 10 INJECTION, EMULSION INTRAVENOUS at 20:04

## 2025-04-15 RX ADMIN — PHENOBARBITAL SODIUM 65 MG: 65 INJECTION INTRAMUSCULAR at 14:30

## 2025-04-15 RX ADMIN — LORAZEPAM 2 MG: 2 INJECTION INTRAMUSCULAR; INTRAVENOUS at 05:25

## 2025-04-15 RX ADMIN — Medication 60 PERCENT: at 20:12

## 2025-04-15 RX ADMIN — Medication 100 PERCENT: at 06:33

## 2025-04-15 RX ADMIN — Medication 1 TABLET: at 02:05

## 2025-04-15 RX ADMIN — DIPHENHYDRAMINE HYDROCHLORIDE 50 MG: 50 INJECTION, SOLUTION INTRAMUSCULAR; INTRAVENOUS at 03:47

## 2025-04-15 RX ADMIN — PROPOFOL 30 MCG/KG/MIN: 10 INJECTION, EMULSION INTRAVENOUS at 11:10

## 2025-04-15 RX ADMIN — THIAMINE HYDROCHLORIDE 500 MG: 100 INJECTION, SOLUTION INTRAMUSCULAR; INTRAVENOUS at 16:14

## 2025-04-15 RX ADMIN — Medication 80 PERCENT: at 07:53

## 2025-04-15 RX ADMIN — PROPOFOL 30 MCG/KG/MIN: 10 INJECTION, EMULSION INTRAVENOUS at 15:44

## 2025-04-15 RX ADMIN — PHENOBARBITAL SODIUM 234 MG: 65 INJECTION INTRAMUSCULAR at 12:35

## 2025-04-15 RX ADMIN — PHENOBARBITAL SODIUM 312 MG: 65 INJECTION INTRAMUSCULAR at 06:00

## 2025-04-15 RX ADMIN — POTASSIUM CHLORIDE 20 MEQ: 14.9 INJECTION, SOLUTION INTRAVENOUS at 06:09

## 2025-04-15 RX ADMIN — METOPROLOL TARTRATE 5 MG: 5 INJECTION INTRAVENOUS at 07:01

## 2025-04-15 RX ADMIN — PHENOBARBITAL SODIUM 234 MG: 65 INJECTION INTRAMUSCULAR at 08:43

## 2025-04-15 RX ADMIN — PROPOFOL 5 MCG/KG/MIN: 10 INJECTION, EMULSION INTRAVENOUS at 04:34

## 2025-04-15 RX ADMIN — POTASSIUM CHLORIDE 20 MEQ: 14.9 INJECTION, SOLUTION INTRAVENOUS at 08:03

## 2025-04-15 RX ADMIN — DEXTROSE MONOHYDRATE 750 MG: 50 INJECTION, SOLUTION INTRAVENOUS at 18:10

## 2025-04-15 RX ADMIN — DOXYCYCLINE 100 MG: 100 INJECTION, POWDER, LYOPHILIZED, FOR SOLUTION INTRAVENOUS at 11:43

## 2025-04-15 RX ADMIN — ROCURONIUM BROMIDE 100 MG: 10 INJECTION, SOLUTION INTRAVENOUS at 05:47

## 2025-04-15 RX ADMIN — PERFLUTREN 3 ML OF DILUTION: 6.52 INJECTION, SUSPENSION INTRAVENOUS at 09:04

## 2025-04-15 RX ADMIN — HALOPERIDOL LACTATE 5 MG: 5 INJECTION, SOLUTION INTRAMUSCULAR at 03:46

## 2025-04-15 RX ADMIN — DOXYCYCLINE 100 MG: 100 INJECTION, POWDER, LYOPHILIZED, FOR SOLUTION INTRAVENOUS at 22:15

## 2025-04-15 RX ADMIN — METOPROLOL TARTRATE 5 MG: 1 INJECTION, SOLUTION INTRAVENOUS at 07:01

## 2025-04-15 RX ADMIN — CEFTRIAXONE SODIUM 2 G: 2 INJECTION, SOLUTION INTRAVENOUS at 11:03

## 2025-04-15 RX ADMIN — LORAZEPAM 2 MG: 2 INJECTION INTRAMUSCULAR at 04:34

## 2025-04-15 RX ADMIN — FENTANYL CITRATE 50 MCG: 50 INJECTION, SOLUTION INTRAMUSCULAR; INTRAVENOUS at 06:36

## 2025-04-15 RX ADMIN — PROPOFOL 25 MCG/KG/MIN: 10 INJECTION, EMULSION INTRAVENOUS at 22:10

## 2025-04-15 RX ADMIN — LEVETIRACETAM 1000 MG: 10 INJECTION INTRAVENOUS at 04:34

## 2025-04-15 RX ADMIN — THIAMINE HYDROCHLORIDE 100 MG: 100 INJECTION, SOLUTION INTRAMUSCULAR; INTRAVENOUS at 02:05

## 2025-04-15 RX ADMIN — DEXTROSE 3500 MG: 5 SOLUTION INTRAVENOUS at 06:37

## 2025-04-15 RX ADMIN — PROPOFOL 40 MCG/KG/MIN: 10 INJECTION, EMULSION INTRAVENOUS at 07:59

## 2025-04-15 SDOH — SOCIAL STABILITY: SOCIAL INSECURITY: WITHIN THE LAST YEAR, HAVE YOU BEEN AFRAID OF YOUR PARTNER OR EX-PARTNER?: PATIENT UNABLE TO ANSWER

## 2025-04-15 SDOH — SOCIAL STABILITY: SOCIAL INSECURITY
WITHIN THE LAST YEAR, HAVE YOU BEEN HUMILIATED OR EMOTIONALLY ABUSED IN OTHER WAYS BY YOUR PARTNER OR EX-PARTNER?: PATIENT UNABLE TO ANSWER

## 2025-04-15 SDOH — SOCIAL STABILITY: SOCIAL INSECURITY: DO YOU FEEL ANYONE HAS EXPLOITED OR TAKEN ADVANTAGE OF YOU FINANCIALLY OR OF YOUR PERSONAL PROPERTY?: UNABLE TO ASSESS

## 2025-04-15 SDOH — HEALTH STABILITY: PHYSICAL HEALTH
HOW OFTEN DO YOU NEED TO HAVE SOMEONE HELP YOU WHEN YOU READ INSTRUCTIONS, PAMPHLETS, OR OTHER WRITTEN MATERIAL FROM YOUR DOCTOR OR PHARMACY?: PATIENT UNABLE TO RESPOND

## 2025-04-15 SDOH — SOCIAL STABILITY: SOCIAL INSECURITY
WITHIN THE LAST YEAR, HAVE YOU BEEN RAPED OR FORCED TO HAVE ANY KIND OF SEXUAL ACTIVITY BY YOUR PARTNER OR EX-PARTNER?: PATIENT UNABLE TO ANSWER

## 2025-04-15 SDOH — HEALTH STABILITY: PHYSICAL HEALTH
ON AVERAGE, HOW MANY DAYS PER WEEK DO YOU ENGAGE IN MODERATE TO STRENUOUS EXERCISE (LIKE A BRISK WALK)?: PATIENT UNABLE TO ANSWER

## 2025-04-15 SDOH — ECONOMIC STABILITY: FOOD INSECURITY
WITHIN THE PAST 12 MONTHS, THE FOOD YOU BOUGHT JUST DIDN'T LAST AND YOU DIDN'T HAVE MONEY TO GET MORE.: PATIENT UNABLE TO ANSWER

## 2025-04-15 SDOH — ECONOMIC STABILITY: HOUSING INSECURITY: AT ANY TIME IN THE PAST 12 MONTHS, WERE YOU HOMELESS OR LIVING IN A SHELTER (INCLUDING NOW)?: PATIENT UNABLE TO ANSWER

## 2025-04-15 SDOH — ECONOMIC STABILITY: HOUSING INSECURITY
IN THE LAST 12 MONTHS, WAS THERE A TIME WHEN YOU WERE NOT ABLE TO PAY THE MORTGAGE OR RENT ON TIME?: PATIENT UNABLE TO ANSWER

## 2025-04-15 SDOH — SOCIAL STABILITY: SOCIAL INSECURITY: ABUSE: ADULT

## 2025-04-15 SDOH — SOCIAL STABILITY: SOCIAL INSECURITY
WITHIN THE LAST YEAR, HAVE YOU BEEN KICKED, HIT, SLAPPED, OR OTHERWISE PHYSICALLY HURT BY YOUR PARTNER OR EX-PARTNER?: PATIENT UNABLE TO ANSWER

## 2025-04-15 SDOH — ECONOMIC STABILITY: INCOME INSECURITY
IN THE PAST 12 MONTHS HAS THE ELECTRIC, GAS, OIL, OR WATER COMPANY THREATENED TO SHUT OFF SERVICES IN YOUR HOME?: PATIENT UNABLE TO ANSWER

## 2025-04-15 SDOH — ECONOMIC STABILITY: FOOD INSECURITY
WITHIN THE PAST 12 MONTHS, YOU WORRIED THAT YOUR FOOD WOULD RUN OUT BEFORE YOU GOT THE MONEY TO BUY MORE.: PATIENT UNABLE TO ANSWER

## 2025-04-15 SDOH — SOCIAL STABILITY: SOCIAL INSECURITY: WERE YOU ABLE TO COMPLETE ALL THE BEHAVIORAL HEALTH SCREENINGS?: NO

## 2025-04-15 SDOH — ECONOMIC STABILITY: FOOD INSECURITY
HOW HARD IS IT FOR YOU TO PAY FOR THE VERY BASICS LIKE FOOD, HOUSING, MEDICAL CARE, AND HEATING?: PATIENT UNABLE TO ANSWER

## 2025-04-15 SDOH — SOCIAL STABILITY: SOCIAL INSECURITY: DOES ANYONE TRY TO KEEP YOU FROM HAVING/CONTACTING OTHER FRIENDS OR DOING THINGS OUTSIDE YOUR HOME?: UNABLE TO ASSESS

## 2025-04-15 SDOH — SOCIAL STABILITY: SOCIAL INSECURITY: DO YOU FEEL UNSAFE GOING BACK TO THE PLACE WHERE YOU ARE LIVING?: UNABLE TO ASSESS

## 2025-04-15 SDOH — SOCIAL STABILITY: SOCIAL INSECURITY: ARE THERE ANY APPARENT SIGNS OF INJURIES/BEHAVIORS THAT COULD BE RELATED TO ABUSE/NEGLECT?: UNABLE TO ASSESS

## 2025-04-15 SDOH — SOCIAL STABILITY: SOCIAL INSECURITY: ARE YOU OR HAVE YOU BEEN THREATENED OR ABUSED PHYSICALLY, EMOTIONALLY, OR SEXUALLY BY ANYONE?: UNABLE TO ASSESS

## 2025-04-15 SDOH — SOCIAL STABILITY: SOCIAL INSECURITY: HAVE YOU HAD ANY THOUGHTS OF HARMING ANYONE ELSE?: UNABLE TO ASSESS

## 2025-04-15 SDOH — HEALTH STABILITY: PHYSICAL HEALTH: ON AVERAGE, HOW MANY MINUTES DO YOU ENGAGE IN EXERCISE AT THIS LEVEL?: PATIENT UNABLE TO ANSWER

## 2025-04-15 SDOH — ECONOMIC STABILITY: TRANSPORTATION INSECURITY
IN THE PAST 12 MONTHS, HAS LACK OF TRANSPORTATION KEPT YOU FROM MEDICAL APPOINTMENTS OR FROM GETTING MEDICATIONS?: PATIENT UNABLE TO ANSWER

## 2025-04-15 SDOH — ECONOMIC STABILITY: HOUSING INSECURITY: IN THE PAST 12 MONTHS, HOW MANY TIMES HAVE YOU MOVED WHERE YOU WERE LIVING?: 0

## 2025-04-15 SDOH — SOCIAL STABILITY: SOCIAL INSECURITY: HAVE YOU HAD THOUGHTS OF HARMING ANYONE ELSE?: UNABLE TO ASSESS

## 2025-04-15 SDOH — SOCIAL STABILITY: SOCIAL INSECURITY: HAS ANYONE EVER THREATENED TO HURT YOUR FAMILY OR YOUR PETS?: UNABLE TO ASSESS

## 2025-04-15 ASSESSMENT — LIFESTYLE VARIABLES
HEADACHE, FULLNESS IN HEAD: NOT PRESENT
TOTAL SCORE: 3
PAROXYSMAL SWEATS: NO SWEAT VISIBLE
AGITATION: NORMAL ACTIVITY
ANXIETY: NO ANXIETY, AT EASE
AGITATION: NORMAL ACTIVITY
TREMOR: NO TREMOR
TOTAL SCORE: 7
SKIP TO QUESTIONS 9-10: 0
AUDITORY DISTURBANCES: NOT PRESENT
HOW OFTEN DO YOU HAVE 6 OR MORE DRINKS ON ONE OCCASION: PATIENT UNABLE TO ANSWER
NAUSEA AND VOMITING: NO NAUSEA AND NO VOMITING
HEADACHE, FULLNESS IN HEAD: NOT PRESENT
HOW MANY STANDARD DRINKS CONTAINING ALCOHOL DO YOU HAVE ON A TYPICAL DAY: PATIENT UNABLE TO ANSWER
VISUAL DISTURBANCES: NOT PRESENT
HOW OFTEN DO YOU HAVE A DRINK CONTAINING ALCOHOL: PATIENT UNABLE TO ANSWER
PAROXYSMAL SWEATS: BEADS OF SWEAT OBVIOUS ON FOREHEAD
ORIENTATION AND CLOUDING OF SENSORIUM: ORIENTED AND CAN DO SERIAL ADDITIONS
VISUAL DISTURBANCES: NOT PRESENT
TREMOR: NO TREMOR
NAUSEA AND VOMITING: NO NAUSEA AND NO VOMITING
HEADACHE, FULLNESS IN HEAD: NOT PRESENT
ANXIETY: NO ANXIETY, AT EASE
ORIENTATION AND CLOUDING OF SENSORIUM: ORIENTED AND CAN DO SERIAL ADDITIONS
HEADACHE, FULLNESS IN HEAD: NOT PRESENT
ORIENTATION AND CLOUDING OF SENSORIUM: ORIENTED AND CAN DO SERIAL ADDITIONS
NAUSEA AND VOMITING: NO NAUSEA AND NO VOMITING
HEADACHE, FULLNESS IN HEAD: NOT PRESENT
PAROXYSMAL SWEATS: BEADS OF SWEAT OBVIOUS ON FOREHEAD
ORIENTATION AND CLOUDING OF SENSORIUM: ORIENTED AND CAN DO SERIAL ADDITIONS
ORIENTATION AND CLOUDING OF SENSORIUM: CANNOT DO SERIAL ADDITIONS OR IS UNCERTAIN ABOUT DATE
PAROXYSMAL SWEATS: DRENCHING SWEATS
NAUSEA AND VOMITING: NO NAUSEA AND NO VOMITING
AUDITORY DISTURBANCES: NOT PRESENT
TOTAL SCORE: 0
TREMOR: NO TREMOR
ANXIETY: NO ANXIETY, AT EASE
HEADACHE, FULLNESS IN HEAD: NOT PRESENT
AUDITORY DISTURBANCES: NOT PRESENT
AGITATION: NORMAL ACTIVITY
AUDIT-C TOTAL SCORE: -1
PAROXYSMAL SWEATS: NO SWEAT VISIBLE
ANXIETY: NO ANXIETY, AT EASE
VISUAL DISTURBANCES: NOT PRESENT
TREMOR: NO TREMOR
TREMOR: NO TREMOR
PAROXYSMAL SWEATS: BARELY PERCEPTIBLE SWEATING, PALMS MOIST
VISUAL DISTURBANCES: NOT PRESENT
TOTAL SCORE: 4
AUDIT-C TOTAL SCORE: -1
NAUSEA AND VOMITING: NO NAUSEA AND NO VOMITING
AGITATION: NORMAL ACTIVITY
VISUAL DISTURBANCES: NOT PRESENT
AGITATION: NORMAL ACTIVITY
PAROXYSMAL SWEATS: 3
ORIENTATION AND CLOUDING OF SENSORIUM: ORIENTED AND CAN DO SERIAL ADDITIONS
AUDITORY DISTURBANCES: NOT PRESENT
NAUSEA AND VOMITING: NO NAUSEA AND NO VOMITING
NAUSEA AND VOMITING: NO NAUSEA AND NO VOMITING
AUDITORY DISTURBANCES: NOT PRESENT
HEADACHE, FULLNESS IN HEAD: NOT PRESENT
AGITATION: NORMAL ACTIVITY
TOTAL SCORE: 4
TOTAL SCORE: 2
AUDITORY DISTURBANCES: NOT PRESENT
VISUAL DISTURBANCES: NOT PRESENT
AUDITORY DISTURBANCES: NOT PRESENT
ANXIETY: NO ANXIETY, AT EASE
TREMOR: NO TREMOR
TOTAL SCORE: 4
TREMOR: NO TREMOR
ANXIETY: NO ANXIETY, AT EASE
PAROXYSMAL SWEATS: NO SWEAT VISIBLE
AGITATION: NORMAL ACTIVITY
TREMOR: NO TREMOR
AGITATION: NORMAL ACTIVITY
AGITATION: NORMAL ACTIVITY
ORIENTATION AND CLOUDING OF SENSORIUM: ORIENTED AND CAN DO SERIAL ADDITIONS
PAROXYSMAL SWEATS: BEADS OF SWEAT OBVIOUS ON FOREHEAD
ANXIETY: NO ANXIETY, AT EASE
VISUAL DISTURBANCES: NOT PRESENT
TREMOR: NO TREMOR

## 2025-04-15 ASSESSMENT — ACTIVITIES OF DAILY LIVING (ADL)
LACK_OF_TRANSPORTATION: PATIENT UNABLE TO ANSWER
JUDGMENT_ADEQUATE_SAFELY_COMPLETE_DAILY_ACTIVITIES: UNABLE TO ASSESS
WALKS IN HOME: UNABLE TO ASSESS
GROOMING: UNABLE TO ASSESS
HEARING - LEFT EAR: UNABLE TO ASSESS
ADEQUATE_TO_COMPLETE_ADL: UNABLE TO ASSESS
FEEDING YOURSELF: UNABLE TO ASSESS
ADEQUATE_TO_COMPLETE_ADL: UNABLE TO ASSESS
FEEDING YOURSELF: UNABLE TO ASSESS
DRESSING YOURSELF: UNABLE TO ASSESS
GROOMING: UNABLE TO ASSESS
BATHING: UNABLE TO ASSESS
JUDGMENT_ADEQUATE_SAFELY_COMPLETE_DAILY_ACTIVITIES: UNABLE TO ASSESS
TOILETING: UNABLE TO ASSESS
HEARING - LEFT EAR: UNABLE TO ASSESS
WALKS IN HOME: UNABLE TO ASSESS
BATHING: UNABLE TO ASSESS
HEARING - RIGHT EAR: UNABLE TO ASSESS
PATIENT'S MEMORY ADEQUATE TO SAFELY COMPLETE DAILY ACTIVITIES?: UNABLE TO ASSESS
DRESSING YOURSELF: UNABLE TO ASSESS
PATIENT'S MEMORY ADEQUATE TO SAFELY COMPLETE DAILY ACTIVITIES?: UNABLE TO ASSESS
TOILETING: UNABLE TO ASSESS
HEARING - RIGHT EAR: UNABLE TO ASSESS

## 2025-04-15 ASSESSMENT — ENCOUNTER SYMPTOMS
AGITATION: 1
HYPERACTIVE: 1
CONFUSION: 1
SEIZURES: 1
NERVOUS/ANXIOUS: 1

## 2025-04-15 ASSESSMENT — COGNITIVE AND FUNCTIONAL STATUS - GENERAL
DRESSING REGULAR UPPER BODY CLOTHING: TOTAL
PATIENT BASELINE BEDBOUND: NO
TOILETING: TOTAL
STANDING UP FROM CHAIR USING ARMS: TOTAL
TURNING FROM BACK TO SIDE WHILE IN FLAT BAD: TOTAL
WALKING IN HOSPITAL ROOM: TOTAL
MOBILITY SCORE: 6
HELP NEEDED FOR BATHING: TOTAL
CLIMB 3 TO 5 STEPS WITH RAILING: TOTAL
DAILY ACTIVITIY SCORE: 6
DRESSING REGULAR LOWER BODY CLOTHING: TOTAL
HELP NEEDED FOR BATHING: TOTAL
TURNING FROM BACK TO SIDE WHILE IN FLAT BAD: TOTAL
PERSONAL GROOMING: TOTAL
TOILETING: TOTAL
MOVING FROM LYING ON BACK TO SITTING ON SIDE OF FLAT BED WITH BEDRAILS: TOTAL
DAILY ACTIVITIY SCORE: 6
DRESSING REGULAR UPPER BODY CLOTHING: TOTAL
MOVING FROM LYING ON BACK TO SITTING ON SIDE OF FLAT BED WITH BEDRAILS: TOTAL
EATING MEALS: TOTAL
MOVING TO AND FROM BED TO CHAIR: TOTAL
CLIMB 3 TO 5 STEPS WITH RAILING: TOTAL
PERSONAL GROOMING: TOTAL
WALKING IN HOSPITAL ROOM: TOTAL
MOBILITY SCORE: 6
EATING MEALS: TOTAL
MOVING TO AND FROM BED TO CHAIR: TOTAL
STANDING UP FROM CHAIR USING ARMS: TOTAL
DRESSING REGULAR LOWER BODY CLOTHING: TOTAL

## 2025-04-15 ASSESSMENT — COLUMBIA-SUICIDE SEVERITY RATING SCALE - C-SSRS
6. HAVE YOU EVER DONE ANYTHING, STARTED TO DO ANYTHING, OR PREPARED TO DO ANYTHING TO END YOUR LIFE?: NO
2. HAVE YOU ACTUALLY HAD ANY THOUGHTS OF KILLING YOURSELF?: NO
1. IN THE PAST MONTH, HAVE YOU WISHED YOU WERE DEAD OR WISHED YOU COULD GO TO SLEEP AND NOT WAKE UP?: NO

## 2025-04-15 ASSESSMENT — PAIN - FUNCTIONAL ASSESSMENT
PAIN_FUNCTIONAL_ASSESSMENT: CPOT (CRITICAL CARE PAIN OBSERVATION TOOL)

## 2025-04-15 NOTE — SIGNIFICANT EVENT
Assessment/Plan       63 y/o male with PMHX of DISORDER with withdrawals, seizures (on Keppra), HTN, pre-- DM, persistent A-fib (on Eliquis), KAYODE (noncompliant W/CPAP), COLIN, and depression presents to the ED today from home where her mother witnessed seizure activity that lasted approximately 10 minutes.  Patient intubated for worsening agitation and restlessness despite sedation efforts 50mg IV Benadryl, 5mg Haldol, and 2mg x3 doses of Ativan.  More seizure-like activity observed after transfer to ICU, phenobarbital taper initiated      Neuro:  #Acute metabolic encephalopathy  #Seizure disorder (EtOH withdrawal vs poor Keppra compliance)  #Acute alcohol withdrawal  #Breakthrough seizure  - Baseline neuro function: A&O x 3, A&O x2 on arrival  - CT Head: No acute issues  - Sedation: Propofol  - Analgesia: Fentanyl  - Phenobarb taper  - Continue alcohol withdrawal orders and provide scheduled vitamin B1, folic acid, and multivitamin  - Maintain sleep hygiene, monitor ICU delirium  - Neurology consulted for EEG d/t seizures/encephalopathy  - Neurochecks, CAM assessment, delirium precautions  - Seizure precautions  - SAT/SBT in am    Cardiovascular  #Afib  #HFpEF  #ST changes with elevated troponin - likely demand ischemia  -ECG: Sinus tachycardia-->IV metoprolol, p.o. when able  -Stress test (10/04/21) LVEF 45% with global LV systolic dysfunction  -TTE () LVEF 50-55%, impaired LV diastolic filling  -Home Eliquis on hold: VTE ppx Heparin gtt  -Asprin ordered  -Trend troponins  -Maintain MAPs>65    Pulmonary:  #Acute respiratory failure requiring mechanical ventilation  -Intubated to support his airway and ventilatory mechanics, and for his general protection  -ABG in ED demonstrating a respiratory acidosis (7.23/65/94/ 7.6) and chronic Co2 retention  -Recent AB point 3/60/117/ 2.0 lactate  -Vent settings: AC/20/500/8      GI:  -NPO    Renal:   #Metabolic lactic acidosis  -Trend lactate  -U tox  pending,  -Strict intake and output monitoring  -Daily BMP and electrolyte repletion    Endocrine:  #Pre DM  - Hgb A1c (10/21/2024) 5.7  - BG POCT q 6 hr     Heme/Onc:  #Macrocytic anemia  - Thiamine, folate, multivitamin   - Daily CBC, transfuse for Hgb goal >  7 or symptomatic anemia    ID:  #c/f pneumonia (CAP/aspiration)  -Empiric CAP coverage: Vanco, Zosyn  -- Culture Data: Blood, urine, tracheal aspirate, MRSA for vanc de-escalation pending    Skin/MSK:  -No acute issues    ICU CHECK LIST:   Antimicrobials: Vanco, Zosyn  Oxygen: Vent  Drips:   Feeding/Fluids:    Analgesia: Fentanyl  Sedation:   Thromboprophylaxis: SCDs and heparin gtt  Ulcer prophylaxis: PPI  Glycemic control: BGM with SSI as needed  Bowel care: PRN   Indwelling catheters: Estevez  Lines: PIVs   Restraints: none  Dispo: MICU   Code Status: Full    I have personally spent 50 minutes of critical care time, exclusive of time spent on any procedures, in evaluation and management of this critically ill patient’s condition mentioned above in the assessment and plan.     PORETR Rivera-CNP  Pulmonary and Critical Care Medicine    Please excuse any typographical or unwanted errors within this documentation as voice recognition software was used to dictate this note.

## 2025-04-15 NOTE — CONSULTS
Inpatient consult to Neurology  Consult performed by: Drake Weston DO  Consult ordered by: Dayanna Naidu MD  Reason for consult: Seizure      History Of Present Illness  Elvis Masters is a 64 y.o. male with PMH significant for Alcohol use disorder, persistent A-fib (Eliquis), seizure (Keppra, unknown etiology), KAYODE (noncompliant with CPAP), HFpEF, HTN, HLD, pre-DM, who presented to [facility] on 4/15 due to a GTC per mother that occurred at home and lasted roughly 10 minutes.  Patient was postictal throughout transport but was A&O x 2 on arrival and stated his last drink was >36 hours ago.  On observation in the ED he exhibited worsening agitation/restlessness.  Given Benadryl, Haldol, multiple doses of Ativan but the symptoms still remained.  Patient was intubated successfully.  While being monitored in ED he exhibited more seizure-like activity and given additional Ativan. CT head was unremarkable.  Neurology consulted for breakthrough seizure in setting of alcohol withdrawal.  EEG ordered and pending.  Patient is now intubated/sedated with propofol and fentanyl.  Phenobarbital taper started for withdrawal.    Past Medical History  Past Medical History:   Diagnosis Date    Hypertension      Surgical History  No past surgical history on file.  Social History  Social History     Tobacco Use    Smoking status: Never    Smokeless tobacco: Former   Substance Use Topics    Alcohol use: Yes     Allergies  Lisinopril  No medications prior to admission.       Review of Systems   Reason unable to perform ROS: Intubated and sedated.   Neurological Exam  Physical Exam  GENERAL APPEARANCE:  No distress, obese habitus  CARDIOVASCULAR: RRR, without murmur. Pulses +2 and equal in all extremities.  MENTAL STATE: Intubated and sedated. Unable to follow commands  CRANIAL NERVES:     CN 2-unable to assess     CN 3, 4, 6- PERRLA.  Spontaneous eye movement.      CN 5-unable to assess     CN 7- Nasolabial folds symmetric.     CN  "8-unable to assess     CN 9-unable to assess     CN 11-unable to assess     CN 12-unable to assess  MOTOR:  Muscle bulk and tone were normal in UE/LE.  Unable to assess strength. No fasciculations, tremor or other abnormal movements present.   SENSORY: Does not respond to painful stimuli due to sedation  COORDINATION: Unable to assess     last Recorded Vitals  Blood pressure 130/84, pulse 106, temperature 37 °C (98.6 °F), resp. rate (!) 45, height 1.803 m (5' 11\"), weight (!) 155 kg (342 lb 13 oz), SpO2 94%.    I have personally reviewed the following imaging results Imaging  CT head wo IV contrast    Result Date: 4/15/2025  No acute intracranial hemorrhage or mass effect.     MACRO: None.   Signed by: Che Jones 4/15/2025 6:07 AM Dictation workstation:   [id number]    XR chest 1 view    Result Date: 4/15/2025  Diminished inspiration with increased interstitial markings bilaterally, likely mild pulmonary vascular congestion. Right suprahilar and left infrahilar airspace opacities which may indicate atelectasis or pneumonia in the appropriate clinical setting. Indeterminate positioning of the enteric tube with the distal end poorly visualized overlying the lower mediastinum.  If needed upper abdominal radiograph could be considered to verify positioning. Signed by David Pepe     Cardiology, Vascular, and Other Imaging  Transthoracic Echo (TTE) Complete    Result Date: 4/15/2025   [facility], [address] [place] [address]           Tel [phone] and Fax [phone] TRANSTHORACIC ECHOCARDIOGRAM REPORT  Patient Name:       EMELIA GALVAN       Reading Physician:    [id number] Vargas Anderson MD Study Date:         4/15/2025           Ordering Provider:    [id number] DINESH MINA MRN/PID:            [id number]            Fellow: Accession#:         [id number]        Nurse: " Date of Birth/Age:  1960 / 64 years Sonographer:          Vanna Curry RDCS Gender assigned at  M                   Additional Staff: Birth: Height:             180.34 cm           Admit Date:           4/15/2025 Weight:             153.32 kg           Admission Status:     Inpatient - STAT BSA / BMI:          2.64 m2 / 47.14     Encounter#:           [id number]                     kg/m2 Blood Pressure:     106/57 mmHg         Department Location:  [address]                                                               floor/ICU Study Type:    TRANSTHORACIC ECHO (TTE) COMPLETE Diagnosis/ICD: Ventricular tachycardia, unspecified-I47.20; Unspecified atrial                fibrillation-I48.91 Indication:    Seizure CPT Code:      Echo Complete w Full Doppler-[id number] Patient History: Pertinent History: HTN, A-Fib and Hyperlipidemia. ETOH Disorder, KAYODE. Study Detail: The following Echo studies were performed: 2D, M-Mode, Doppler and               color flow. Technically challenging study due to patient lying in               supine position and body habitus. The patient is intubated.               Definity used as a contrast agent for endocardial border               definition. Total contrast used for this procedure was 3 mL via IV               push. Unable to obtain subcostal and suprasternal notch view.  PHYSICIAN INTERPRETATION: Left Ventricle: Left ventricular ejection fraction is low normal, by visual estimate at 50-55%. There are no regional left ventricular wall motion abnormalities. The left ventricular cavity size is normal. There is mildly increased septal and mildly increased posterior left ventricular wall thickness. There is left ventricular concentric remodeling. Spectral Doppler shows a Grade I (impaired relaxation pattern) of left ventricular diastolic filling with normal left atrial filling pressure. Left Atrium: The left atrial size is  normal. Right Ventricle: The right ventricle is normal in size. There is normal right ventricular global systolic function. Right Atrium: The right atrium is normal in size. Aortic Valve: The aortic valve is trileaflet. There is no evidence of aortic valve regurgitation. The peak instantaneous gradient of the aortic valve is 8 mmHg. Mitral Valve: The mitral valve is normal in structure. There is trace mitral valve regurgitation. Tricuspid Valve: The tricuspid valve is structurally normal. There is trace tricuspid regurgitation. Pulmonic Valve: The pulmonic valve is structurally normal. There is trace pulmonic valve regurgitation. Pericardium: There is no pericardial effusion noted. Aorta: The aortic root is normal. There is moderate dilatation of the ascending aorta. There is mild dilatation of the aortic root.  CONCLUSIONS:  1. Left ventricular ejection fraction is low normal, by visual estimate at 50-55%.  2. Spectral Doppler shows a Grade I (impaired relaxation pattern) of left ventricular diastolic filling with normal left atrial filling pressure.  3. There is normal right ventricular global systolic function.  4. There is moderate dilatation of the ascending aorta. QUANTITATIVE DATA SUMMARY:  2D MEASUREMENTS:          Normal Ranges: Ao Root d:       3.60 cm  (2.0-3.7cm) LAs:             4.70 cm  (2.7-4.0cm) IVSd:            1.32 cm  (0.6-1.1cm) LVPWd:           1.23 cm  (0.6-1.1cm) LVIDd:           3.81 cm  (3.9-5.9cm) LVIDs:           2.68 cm LV Mass Index:   64 g/m2 LVEDV Index:     45 ml/m2 LV % FS          29.7 %  LEFT ATRIUM:                  Normal Ranges: LA Vol A4C:        51.8 ml    (22+/-6mL/m2) LA Vol A2C:        41.6 ml LA Vol BP:         52.6 ml LA Vol Index A4C:  19.7ml/m2 LA Vol Index A2C:  15.8 ml/m2 LA Vol Index BP:   20.0 ml/m2 LA Area A4C:       19.6 cm2 LA Area A2C:       19.9 cm2 LA Major Axis A4C: 6.3 cm LA Major Axis A2C: 8.1 cm LA Vol A4C:        50.6 ml LA Vol A2C:        44.9 ml LA Vol  Index BSA:  18.1 ml/m2  RIGHT ATRIUM:          Normal Ranges: RA Area A4C:  14.0 cm2  M-MODE MEASUREMENTS:         Normal Ranges: Ao Root:             3.40 cm (2.0-3.7cm) LAs:                 4.60 cm (2.7-4.0cm)  AORTA MEASUREMENTS:         Normal Ranges: Asc Ao, d:          4.10 cm (2.1-3.4cm)  LV SYSTOLIC FUNCTION:                      Normal Ranges: EF-A4C View:    65 % (>=55%) EF-A2C View:    44 % EF-Biplane:     55 % EF-Visual:      53 % LV EF Reported: 53 %  LV DIASTOLIC FUNCTION:          Normal Ranges: MV medial e'           0.09 m/s  AORTIC VALVE:           Normal Ranges: AoV Vmax:      1.41 m/s (<=1.7m/s) AoV Peak P.0 mmHg (<20mmHg) LVOT Max Shine:  1.10 m/s (<=1.1m/s) LVOT VTI:      12.20 cm LVOT Diameter: 2.30 cm  (1.8-2.4cm) AoV Area,Vmax: 3.24 cm2 (2.5-4.5cm2)  RIGHT VENTRICLE: RV Basal 2.80 cm RV Mid   2.21 cm TAPSE:   21.3 mm RV s'    0.13 m/s  PULMONIC VALVE:            Normal Ranges: RVOT diam: systole 6.30 cm (1.7-2.3cm)  [id number] Vargas Anderson MD Electronically signed on 4/15/2025 at 9:43:47 AM  ** Final **         Assessment/Plan   Assessment & Plan  Seizure (Multi)    Alcohol withdrawal delirium, acute, hyperactive (Multi)    Morbid obesity with BMI of 40.0-44.9, adult (Multi)    Seizure, GTC x2  Alcohol withdrawal  Fever, resolved  C/f sepsis  - CT head on admission unremarkable  - CXR shows concerns for possible LLL pneumonia, likely aspiration in setting of previous GTC.  While fever could be associated with seizure this is typically uncommon and more likely secondary to infectious process.  - Keppra level on admission 36 implies compliance with home medication.  PLAN:  - Talk to ICU staff and recommended titrating sedation to the point of acquiring a neurological exam.  We will need this to determine if patient is having seizures on top of sedation.   - If this cannot be obtained then we would recommend transfer to a unit that can provide continuous EEG monitoring for subclinical  seizures.  - Continue home Keppra; although it should be noted that AED does not suppress alcohol withdrawal associated seizures.  - Should continue phenobarbital taper to address withdrawal  - Rule out metabolic and infectious causes per primary team    Other Issues:  C/f aspiration PNA  Prolonged QTc on admission  Elevated troponin  Lactic acidosis    Drake Weston, DO  PGY-2, Internal Medicine  Please SecureChat for any further questions  This is a preliminary note, please await attending attestation for final A/P

## 2025-04-15 NOTE — ED TRIAGE NOTES
Pt BIBA from home via [facility]. Pts mother called 911 due to seizure. Mother states seizure lasted appx 10 mins. Pt has hx of seizure with alcohol consumption. Ems states pt was still postictal during transport. Pt arrives to ed a/o/2, pt denies any alcohol use today. Pt was initially 89%  sp02 on room air. Pt placed on 3LPM NC. Seizure pads and bed alarm in place during triage. Pt states he takes no seizure meds. Pt has complaints of pain during triage

## 2025-04-15 NOTE — NURSING NOTE
1330: Sedation vacation started per neurology.     1345: Patient able to respond to voice and withdrawing more to painful stimulus.    1350: Patient became very agitated and restless, un redirectable. Resumed sedation per provider.

## 2025-04-15 NOTE — ED PROCEDURE NOTE
Procedure  Intubation    Performed by: Shan Mcbride MD  Authorized by: Charlie Fraser DO    Consent:     Consent obtained:  Emergent situation  Pre-procedure details:     Indications: altered consciousness      Patient status:  Altered mental status    Pharmacologic strategy: RSI      Induction agents:  Propofol    Paralytics:  Rocuronium  Procedure details:     Preoxygenation:  Bag valve mask    Number of attempts:  1  Successful intubation attempt details:     Intubation method:  Oral    Laryngoscope blade:  Hypercurved    Bougie used: no      Grade view: I      Tube size (mm):  7.5    Tube type:  Cuffed    Tube visualized through cords: yes    Placement assessment:     ETT at teeth/gumline (cm):  22    Tube secured with:  ETT hutton    Breath sounds:  Equal    Placement verification: chest rise, direct visualization and equal breath sounds    Post-procedure details:     Procedure completion:  Tolerated  Comments:      CXR pending               Shan Mcbride MD  Resident  04/15/25 2445

## 2025-04-15 NOTE — CONSULTS
Consults  History Of Present Illness:    Elvis Masters is a 64 y.o. male with past medical history Of hypertension, KAYODE, COLIN, depression, persistent atrial fibrillation on Eliquis, prediabetes who was admitted to the hospital out of concern for full body seizure-like activity.  As per HPI patient was brought in noted be fairly agitated and restless and was given Benadryl, Haldol as well as Ativan.  Patient was then intubated.  She was given additional Ativan and phenobarbital.  Cardiology consulted for elevated troponin and history of atrial fibrillation.  Patient underwent echocardiogram today which showed EF of 50 to 55% grade 1 diastolic function normal RV function mildly dilated ascending thoracic aorta.  Of note patient was hospitalizedTowards the end of February with an episode of alcohol withdrawal atrial fibrillation elevated AST.    Patient's admission EKG shows sinus tach with PVCs and mild ST depressions. EKG this morning showed an episode of tachycardia which showed right bundle branch possibly rate dependent atrial flutter versus SVT.    Last Recorded Vitals:  Vitals:    04/15/25 1100 04/15/25 1200 04/15/25 1215 04/15/25 1300   BP: 95/54 94/52 92/57 87/55   BP Location:       Patient Position:       Pulse: 96 91  86   Resp: 17 23  22   Temp: 37 °C (98.6 °F) 36.9 °C (98.4 °F)  36.8 °C (98.2 °F)   TempSrc:       SpO2: 95% 95%  95%   Weight:       Height:           Last Labs:  CBC - 4/15/2025: 12:54 AM  5.7 13.6 213    41.9      CMP - 4/15/2025:  7:43 AM  8.5 7.1 38 --- 0.8   _ 3.9 21 142      PTT - 4/15/2025:  6:06 AM  1.4   16.0 35     Troponin I, High Sensitivity   Date/Time Value Ref Range Status   04/15/2025 10:31  (HH) 0 - 20 ng/L Final     BNP   Date/Time Value Ref Range Status   04/15/2025 06:06  (H) 0 - 99 pg/mL Final     Hemoglobin A1C   Date/Time Value Ref Range Status   10/21/2024 01:51 PM 5.7 (H) 4.0 - 5.6 % Final   01/17/2024 03:19 PM 5.9 (H) 4.0 - 5.6 % Final      Last I/O:  I/O  last 3 completed shifts:  In: 350 (2.3 mL/kg) [IV Piggyback:350]  Out: - (0 mL/kg)   Weight: 153.6 kg     Past Cardiology Tests (Last 3 Years):  EKG:  ECG 12 lead 10/15/2024      ECG 12 lead 08/24/2024    Echo:  Transthoracic Echo (TTE) Complete 04/15/2025    Ejection Fractions:  EF   Date/Time Value Ref Range Status   04/15/2025 09:05 AM 53 %      Cath:  No results found for this or any previous visit from the past 1095 days.    Stress Test:  No results found for this or any previous visit from the past 1095 days.    Cardiac Imaging:  No results found for this or any previous visit from the past 1095 days.      Past Medical History:  He has a past medical history of Hypertension.    Past Surgical History:  He has no past surgical history on file.      Social History:  He reports that he has never smoked. He has quit using smokeless tobacco. He reports current alcohol use. No history on file for drug use.    Family History:  No family history on file.     Allergies:  Lisinopril    Inpatient Medications:  Scheduled medications   Medication Dose Route Frequency    aspirin  81 mg orogastric tube Daily    aspirin  300 mg rectal Daily    cefTRIAXone  2 g intravenous q24h    doxycycline  100 mg intravenous q12h    [START ON 4/16/2025] folic acid  1 mg orogastric tube Daily    levETIRAcetam  750 mg intravenous q12h    [START ON 4/16/2025] multivitamin with iron-minerals  15 mL orogastric tube Daily    oxygen   inhalation Continuous - Inhalation    pantoprazole  40 mg intravenous Daily    perflutren protein A microsphere  0.5 mL intravenous Once in imaging    sulfur hexafluoride microsphr  2 mL intravenous Once in imaging    thiamine  500 mg intravenous q8h [facility]     PRN medications   Medication    heparin    PHENobarbital    rocuronium     Continuous Medications   Medication Dose Last Rate    fentaNYL  0-200 mcg/hr Stopped (04/15/25 1343)    heparin  0-4,000 Units/hr 800 Units/hr (04/15/25 1309)     Outpatient  Medications:  No current outpatient medications    Physical Exam:  General: Intubated and sedated  Skin: Warm and dry  Neck: Unable to assess JVD  ENT: Moist mucous membranes no lesions appreciated  Pulmonary: CTAB  Cards: Regular rate rhythm, no murmurs gallops or rubs appreciated normal S1-S2  Abdomen: Soft nontender nondistended  Extremities: No edema or cyanosis  Psych: Appropriate mood and affect        Assessment/Plan     1.  Tachycardia: Suspect rate dependent A-fib/flutter with aberrancy. VT is included in differential.  Now maintaining sinus rhythm.  Initially presenting with hypokalemia with a potassium of 3.1.  - Correct electrolytes as needed maintain potassium greater than 4 magnesium greater than 2    2.  History of atrial fibrillation status post PVI: Currently on heparin infusion which can be transition to oral anticoagulation once patient is extubated.    3.  Troponin elevation: Suspect related to reported seizure activity, lactic acidosis and tachycardia.  Echo findings reviewed which showed normal ejection fraction with no regional wall motion changes.      (This note was generated with voice recognition software and may contain errors including spelling, grammar, syntax and missed recognition of what was dictated, of which may not have been fully corrected)     Code Status:  Full Code    Jann Thurston MD PhD

## 2025-04-15 NOTE — PROGRESS NOTES
Vancomycin Dosing by Pharmacy- Cessation of Therapy    Consult to pharmacy for vancomycin dosing has been discontinued by the prescriber, pharmacy will sign off at this time.    Please call pharmacy if there are further questions or re-enter a consult if vancomycin is resumed.     Wilmer Gutiérrez, PharmD

## 2025-04-15 NOTE — CONSULTS
Vancomycin Dosing by Pharmacy- INITIAL    Elvis Masters is a 64 y.o. year old male who Pharmacy has been consulted for vancomycin dosing for SIRS. Based on the patient's indication and renal status this patient will be dosed based on a goal AUC of 400-600.     Renal function is currently stable.    Visit Vitals  BP 98/55   Pulse (!) 124   Temp (!) 38.1 °C (100.6 °F)   Resp 15        Lab Results   Component Value Date    CREATININE 0.60 04/15/2025    CREATININE 0.90 10/15/2024    CREATININE 0.70 2024    CREATININE 0.5 2023    CREATININE 0.6 2023    CREATININE 0.76 2021    CREATININE 0.6 04/10/2019        Patient weight is as follows:   Vitals:    04/15/25 0515   Weight: (!) 154 kg (338 lb 10 oz)       Temp (24hrs), Av °C (98.6 °F), Min:24.7 °C (76.5 °F), Max:39.2 °C (102.6 °F)         Assessment/Plan     Patient has already been given a loading dose of 2,000 mg.  Will initiate vancomycin maintenance,  1,750 mg every 12 hours.    This dosing regimen is predicted by [employer] to result in the following pharmacokinetic parameters:    TDJ35-60: 441 mg/L.hr  AUC24,ss: 481 mg/L.hr  Probability of AUC24 > 400: 81 %  Ctrough,ss: 13 mg/L  Probability of Ctrough,ss > 20: 5 %    Follow-up level will be ordered on 25 with am labs unless clinically indicated sooner.  Will continue to monitor renal function daily while on vancomycin and order serum creatinine at least every 48 hours if not already ordered.  Follow for continued vancomycin needs, clinical response, and signs/symptoms of toxicity.       Wilmer Gutiérrez, PharmD

## 2025-04-15 NOTE — PROGRESS NOTES
Occupational Therapy                 Therapy Communication Note    Patient Name: Elvis Masters  MRN: [id number]  Department: [facility] ICU  Room: [address] [place], [place] [address]  Today's Date: 4/15/2025     Discipline: Occupational Therapy    OT Missed Visit: Yes     Missed Visit Reason: Missed Visit Reason: Patient placed on medical hold    Missed Time: Attempt    Comment:

## 2025-04-15 NOTE — SIGNIFICANT EVENT
Attending Attestation Note    I have discussed the case with the resident/advanced practice provider. I have personally performed a history, physical exam, and my own medical decision making. I have reviewed the note and agree with the findings and plan with the following exceptions as identified below. I have personally provided 42 minutes of critical care time on this patient's care. Time includes review of laboratory data, radiology results, discussion with consultants, family and monitoring for potential decompensation. Interventions were performed as documented above.     63 yo male with PMHx s/f Atrial Fibrillation (on Eliquis), HFpEF, Seizure Disorder (reported poor compliance with Keppra, ?etiology), HTN, DLD and DM type II who presented to the ED post seizure at home, had repeat seizure while in the ED. While undergoing workup, became increasingly agitated and uncooperative, intubated while in the ED. Workup thus far largely unremarkable, including CT Head without acute pathology.     #Breakthrough Seizure  #Alcohol Withdrawal  -Utox ordered  -EEG ordered  -now intubated/sedated with Propofol and Fentanyl, phenobarb load given  -high dose thiamine/folate ordered  -Neuro consulted    #Fever  #Sepsis  #Likely LLL Pneumonia on imaging  -blood cultures, urine cultures, tracheal aspirate ordered  -Vanco/Zosyn added    #Atrial Fibrillation  #Sinus tachycardia  -currently appears in sinus tachycardia, on BB at home  -Echo ordered  -IV Metoprolol now, PO when able  -Eliquis switched to Heparin gtt    Rest of care as per resident/NP note

## 2025-04-15 NOTE — ED NOTES
At aprox 0310 began exhibiting seizure activity for which 4mg ativan was administed IV. Shortly after pt became extremely agitated and needed to be physically restrained. An additional 2mg of ativan was given as well as 5 mg haldol and 50 mg benadryl. Pt continued agricive activity and it was determined the needed to be sedated and intabated. Pt was given 100 mg propofol and 100 rocuronium. Pt was then intubated and propofol @ 4.35 lm/hr was started.      José Miguel Nelson RN  04/15/25 0529

## 2025-04-15 NOTE — PROGRESS NOTES
Physical Therapy                 Therapy Communication Note    Patient Name: Elvis Masters  MRN: [id number]  Department: [facility] ICU  Room: [facility]  Today's Date: 4/15/2025     Discipline: Physical Therapy    PT Missed Visit: Yes     Missed Visit Reason: Missed Visit Reason: Patient placed on medical hold    Missed Time: Attempt

## 2025-04-15 NOTE — ED PROVIDER NOTES
HPI   Chief Complaint   Patient presents with   • Seizures       This is a 64 years old male patient presented to the emergency department with a chief complaint of seizure.  Parents witnessed the episode and stated that it was a whole body seizure.  He was supposed to be on Keppra but he is not compliant with his medication.  Also the parent states that he is alcoholic but he was trying to quit drinking alcohol in the last drink was more than 36 hours ago.  Patient stated that the episode was whole body seizure lasted for 10 minutes.  It took him 30 minutes to get back to his baseline mental status.  Patient denies any headaches, lightheadedness, dizziness, neck pain, chest pain, shortness of breath, fever, chills, body aches, abdominal pain, back pain, weakness, focal numbness.    Review of system: As stated above in the HPI section.              Patient History   No past medical history on file.  No past surgical history on file.  No family history on file.  Social History     Tobacco Use   • Smoking status: Not on file   • Smokeless tobacco: Not on file   Substance Use Topics   • Alcohol use: Not on file   • Drug use: Not on file       Physical Exam   ED Triage Vitals   Temperature Heart Rate Respirations BP   04/15/25 0040 04/15/25 0040 04/15/25 0040 04/15/25 0040   36.9 °C (98.4 °F) (!) 103 (!) 26 (!) 163/94      Pulse Ox Temp src Heart Rate Source Patient Position   04/15/25 0040 -- 04/15/25 0045 --   96 %  Monitor       BP Location FiO2 (%)     -- --             Physical Exam  Vitals and nursing note reviewed.   Constitutional:       General: He is not in acute distress.     Appearance: He is well-developed.   HENT:      Head: Normocephalic and atraumatic.   Eyes:      Conjunctiva/sclera: Conjunctivae normal.   Cardiovascular:      Rate and Rhythm: Regular rhythm. Tachycardia present.      Heart sounds: No murmur heard.  Pulmonary:      Effort: Pulmonary effort is normal. No respiratory distress.      Breath  sounds: Normal breath sounds.   Abdominal:      Palpations: Abdomen is soft.      Tenderness: There is no abdominal tenderness.   Musculoskeletal:         General: No swelling.      Cervical back: Neck supple.   Skin:     General: Skin is warm and dry.      Capillary Refill: Capillary refill takes less than 2 seconds.   Neurological:      General: No focal deficit present.      Mental Status: He is alert and oriented to person, place, and time.      Comments: NIH scale of 0   Psychiatric:         Mood and Affect: Mood normal.           ED Course & MDM   ED Course as of 04/15/25 0602   Tue Apr 15, 2025   0447 POCT HCO3 Calculated, Arterial(!): 27.2 [MH]      ED Course User Index  [MH] Shan Mcbride MD         Diagnoses as of 04/15/25 0602   Seizure (Multi)   Agitation                 No data recorded     Wana Coma Scale Score: 14 (04/15/25 0047 : Merna Allen RN)                           Medical Decision Making  Patient seen and examined, presentation is concerning for alcohol withdrawal seizures.  Will start the patient on alcohol withdrawal workup, will obtain CIWA score, will administer multivitamins, and diazepam as needed per CIWA.  Patient will require admission for concern of alcohol withdrawal seizure.    Patient had a second episode of seizure in the emergency department.  Required 2 mg of Ativan.  At this point given the recurrent seizures/2 seizures in 4 hours the concern is status epilepticus versus alcohol withdrawal seizure.  Patient was very agitated and required multiple ED staff to hold him down.  Admitted Haldol, Benadryl, and Ativan with no improvement.  We had to intubate the patient given his prolonged postictal state, concern of status epilepticus and agitation.  Patient is intubated and admitted to the ICU.  We obtained CT head without contrast given the concern of status epilepticus.  ICU staff to follow-up on the CT head results.    I spent 70 minutes of critical care time taking care of  this patient given the concern of status epilepticus, altered mental status/prolonged postictal state, concern of alcohol withdrawal seizure, agitation requiring sedation, and intubation.  This is not including billable procedure.        Procedure  Procedures     Charlie Fraser DO  04/15/25 0602

## 2025-04-15 NOTE — CARE PLAN
The patient's goals for the shift include      The clinical goals for the shift include Patient will remain hemodynamically stable

## 2025-04-16 ENCOUNTER — APPOINTMENT (OUTPATIENT)
Dept: RADIOLOGY | Facility: HOSPITAL | Age: 65
End: 2025-04-16
Payer: COMMERCIAL

## 2025-04-16 ENCOUNTER — APPOINTMENT (OUTPATIENT)
Dept: CARDIOLOGY | Facility: HOSPITAL | Age: 65
End: 2025-04-16
Payer: COMMERCIAL

## 2025-04-16 ENCOUNTER — APPOINTMENT (OUTPATIENT)
Dept: NEUROLOGY | Facility: HOSPITAL | Age: 65
End: 2025-04-16
Payer: COMMERCIAL

## 2025-04-16 LAB
ALBUMIN SERPL BCP-MCNC: 3.8 G/DL (ref 3.4–5)
ANION GAP SERPL CALC-SCNC: 20 MMOL/L (ref 10–20)
ATRIAL RATE: 156 BPM
ATRIAL RATE: 164 BPM
ATRIAL RATE: 99 BPM
BASOPHILS # BLD AUTO: 0.03 X10*3/UL (ref 0–0.1)
BASOPHILS NFR BLD AUTO: 0.4 %
BUN SERPL-MCNC: 12 MG/DL (ref 6–23)
CALCIUM SERPL-MCNC: 8.6 MG/DL (ref 8.6–10.3)
CHLORIDE SERPL-SCNC: 94 MMOL/L (ref 98–107)
CO2 SERPL-SCNC: 25 MMOL/L (ref 21–32)
CREAT SERPL-MCNC: 2.68 MG/DL (ref 0.5–1.3)
EGFRCR SERPLBLD CKD-EPI 2021: 26 ML/MIN/1.73M*2
EOSINOPHIL # BLD AUTO: 0.06 X10*3/UL (ref 0–0.7)
EOSINOPHIL NFR BLD AUTO: 0.8 %
ERYTHROCYTE [DISTWIDTH] IN BLOOD BY AUTOMATED COUNT: 15.7 % (ref 11.5–14.5)
GLUCOSE SERPL-MCNC: 90 MG/DL (ref 74–99)
HCT VFR BLD AUTO: 42.3 % (ref 41–52)
HGB BLD-MCNC: 12.9 G/DL (ref 13.5–17.5)
IMM GRANULOCYTES # BLD AUTO: 0.06 X10*3/UL (ref 0–0.7)
IMM GRANULOCYTES NFR BLD AUTO: 0.8 % (ref 0–0.9)
LYMPHOCYTES # BLD AUTO: 1.19 X10*3/UL (ref 1.2–4.8)
LYMPHOCYTES NFR BLD AUTO: 15.3 %
MAGNESIUM SERPL-MCNC: 2.41 MG/DL (ref 1.6–2.4)
MCH RBC QN AUTO: 29.3 PG (ref 26–34)
MCHC RBC AUTO-ENTMCNC: 30.5 G/DL (ref 32–36)
MCV RBC AUTO: 96 FL (ref 80–100)
MONOCYTES # BLD AUTO: 0.5 X10*3/UL (ref 0.1–1)
MONOCYTES NFR BLD AUTO: 6.4 %
NEUTROPHILS # BLD AUTO: 5.94 X10*3/UL (ref 1.2–7.7)
NEUTROPHILS NFR BLD AUTO: 76.3 %
NRBC BLD-RTO: 0.3 /100 WBCS (ref 0–0)
P AXIS: 0 DEGREES
P AXIS: 109 DEGREES
P AXIS: 50 DEGREES
PHOSPHATE SERPL-MCNC: 6.3 MG/DL (ref 2.5–4.9)
PLATELET # BLD AUTO: 196 X10*3/UL (ref 150–450)
POTASSIUM SERPL-SCNC: 3.4 MMOL/L (ref 3.5–5.3)
PR INTERVAL: 136 MS
PR INTERVAL: 136 MS
PR INTERVAL: 181 MS
Q ONSET: 225 MS
Q ONSET: 253 MS
Q ONSET: 254 MS
QRS COUNT: 16 BEATS
QRS COUNT: 25 BEATS
QRS COUNT: 27 BEATS
QRS DURATION: 102 MS
QRS DURATION: 144 MS
QRS DURATION: 152 MS
QT INTERVAL: 292 MS
QT INTERVAL: 351 MS
QT INTERVAL: 359 MS
QTC CALCULATION(BAZETT): 451 MS
QTC CALCULATION(BAZETT): 482 MS
QTC CALCULATION(BAZETT): 577 MS
QTC FREDERICIA: 408 MS
QTC FREDERICIA: 414 MS
QTC FREDERICIA: 492 MS
R AXIS: 130 DEGREES
R AXIS: 75 DEGREES
R AXIS: 99 DEGREES
RBC # BLD AUTO: 4.4 X10*6/UL (ref 4.5–5.9)
SODIUM SERPL-SCNC: 136 MMOL/L (ref 136–145)
STAPHYLOCOCCUS SPEC CULT: NORMAL
T AXIS: 12 DEGREES
T AXIS: 14 DEGREES
T AXIS: 25 DEGREES
T OFFSET: 371 MS
T OFFSET: 430 MS
T OFFSET: 433 MS
UFH PPP CHRO-ACNC: 0.4 IU/ML (ref ?–1.1)
VANCOMYCIN SERPL-MCNC: 12.5 UG/ML (ref 5–20)
VENTRICULAR RATE: 155 BPM
VENTRICULAR RATE: 164 BPM
VENTRICULAR RATE: 99 BPM
WBC # BLD AUTO: 7.8 X10*3/UL (ref 4.4–11.3)

## 2025-04-16 PROCEDURE — 85025 COMPLETE CBC W/AUTO DIFF WBC: CPT

## 2025-04-16 PROCEDURE — 2500000004 HC RX 250 GENERAL PHARMACY W/ HCPCS (ALT 636 FOR OP/ED): Mod: JZ | Performed by: INTERNAL MEDICINE

## 2025-04-16 PROCEDURE — 95819 EEG AWAKE AND ASLEEP: CPT | Performed by: STUDENT IN AN ORGANIZED HEALTH CARE EDUCATION/TRAINING PROGRAM

## 2025-04-16 PROCEDURE — 70450 CT HEAD/BRAIN W/O DYE: CPT

## 2025-04-16 PROCEDURE — 2500000004 HC RX 250 GENERAL PHARMACY W/ HCPCS (ALT 636 FOR OP/ED): Mod: JZ

## 2025-04-16 PROCEDURE — 95819 EEG AWAKE AND ASLEEP: CPT

## 2025-04-16 PROCEDURE — 71045 X-RAY EXAM CHEST 1 VIEW: CPT

## 2025-04-16 PROCEDURE — 93005 ELECTROCARDIOGRAM TRACING: CPT

## 2025-04-16 PROCEDURE — 2500000001 HC RX 250 WO HCPCS SELF ADMINISTERED DRUGS (ALT 637 FOR MEDICARE OP): Performed by: INTERNAL MEDICINE

## 2025-04-16 PROCEDURE — 99232 SBSQ HOSP IP/OBS MODERATE 35: CPT | Performed by: NURSE PRACTITIONER

## 2025-04-16 PROCEDURE — 2500000005 HC RX 250 GENERAL PHARMACY W/O HCPCS: Performed by: INTERNAL MEDICINE

## 2025-04-16 PROCEDURE — 2500000001 HC RX 250 WO HCPCS SELF ADMINISTERED DRUGS (ALT 637 FOR MEDICARE OP)

## 2025-04-16 PROCEDURE — 2020000001 HC ICU ROOM DAILY

## 2025-04-16 PROCEDURE — 99291 CRITICAL CARE FIRST HOUR: CPT

## 2025-04-16 PROCEDURE — 93010 ELECTROCARDIOGRAM REPORT: CPT | Performed by: STUDENT IN AN ORGANIZED HEALTH CARE EDUCATION/TRAINING PROGRAM

## 2025-04-16 PROCEDURE — 94799 UNLISTED PULMONARY SVC/PX: CPT

## 2025-04-16 PROCEDURE — 94003 VENT MGMT INPAT SUBQ DAY: CPT

## 2025-04-16 PROCEDURE — 71045 X-RAY EXAM CHEST 1 VIEW: CPT | Performed by: RADIOLOGY

## 2025-04-16 PROCEDURE — 36415 COLL VENOUS BLD VENIPUNCTURE: CPT

## 2025-04-16 PROCEDURE — 70450 CT HEAD/BRAIN W/O DYE: CPT | Performed by: RADIOLOGY

## 2025-04-16 PROCEDURE — 99233 SBSQ HOSP IP/OBS HIGH 50: CPT | Performed by: INTERNAL MEDICINE

## 2025-04-16 PROCEDURE — 80202 ASSAY OF VANCOMYCIN: CPT | Performed by: INTERNAL MEDICINE

## 2025-04-16 PROCEDURE — 85520 HEPARIN ASSAY: CPT

## 2025-04-16 PROCEDURE — 2500000005 HC RX 250 GENERAL PHARMACY W/O HCPCS: Performed by: EMERGENCY MEDICINE

## 2025-04-16 PROCEDURE — 80069 RENAL FUNCTION PANEL: CPT

## 2025-04-16 PROCEDURE — 83735 ASSAY OF MAGNESIUM: CPT

## 2025-04-16 PROCEDURE — 87205 SMEAR GRAM STAIN: CPT | Mod: PARLAB | Performed by: INTERNAL MEDICINE

## 2025-04-16 RX ORDER — PHENOBARBITAL SODIUM 65 MG/ML
4 INJECTION, SOLUTION INTRAMUSCULAR; INTRAVENOUS ONCE
Status: DISCONTINUED | OUTPATIENT
Start: 2025-04-16 | End: 2025-04-16

## 2025-04-16 RX ORDER — PHENOBARBITAL SODIUM 65 MG/ML
32.5 INJECTION, SOLUTION INTRAMUSCULAR; INTRAVENOUS EVERY 8 HOURS
Status: DISCONTINUED | OUTPATIENT
Start: 2025-04-20 | End: 2025-04-17 | Stop reason: HOSPADM

## 2025-04-16 RX ORDER — LORAZEPAM 2 MG/ML
1 INJECTION INTRAMUSCULAR EVERY 2 HOUR PRN
Status: DISCONTINUED | OUTPATIENT
Start: 2025-04-16 | End: 2025-04-17 | Stop reason: HOSPADM

## 2025-04-16 RX ORDER — ENOXAPARIN SODIUM 100 MG/ML
40 INJECTION SUBCUTANEOUS DAILY
Status: DISCONTINUED | OUTPATIENT
Start: 2025-04-16 | End: 2025-04-16

## 2025-04-16 RX ORDER — CEFTRIAXONE 2 G/50ML
2 INJECTION, SOLUTION INTRAVENOUS EVERY 12 HOURS
Status: DISCONTINUED | OUTPATIENT
Start: 2025-04-16 | End: 2025-04-17 | Stop reason: HOSPADM

## 2025-04-16 RX ORDER — MULTIVIT-MIN/IRON FUM/FOLIC AC 7.5 MG-4
1 TABLET ORAL DAILY
Status: DISCONTINUED | OUTPATIENT
Start: 2025-04-16 | End: 2025-04-17 | Stop reason: HOSPADM

## 2025-04-16 RX ORDER — SODIUM CHLORIDE, SODIUM LACTATE, POTASSIUM CHLORIDE, CALCIUM CHLORIDE 600; 310; 30; 20 MG/100ML; MG/100ML; MG/100ML; MG/100ML
150 INJECTION, SOLUTION INTRAVENOUS CONTINUOUS
Status: DISCONTINUED | OUTPATIENT
Start: 2025-04-16 | End: 2025-04-17 | Stop reason: HOSPADM

## 2025-04-16 RX ORDER — FOLIC ACID 1 MG/1
1 TABLET ORAL DAILY
Status: DISCONTINUED | OUTPATIENT
Start: 2025-04-16 | End: 2025-04-16

## 2025-04-16 RX ORDER — PHENOBARBITAL SODIUM 65 MG/ML
97.5 INJECTION, SOLUTION INTRAMUSCULAR; INTRAVENOUS EVERY 8 HOURS
Status: DISCONTINUED | OUTPATIENT
Start: 2025-04-16 | End: 2025-04-17 | Stop reason: HOSPADM

## 2025-04-16 RX ORDER — PHENOBARBITAL SODIUM 65 MG/ML
3 INJECTION, SOLUTION INTRAMUSCULAR; INTRAVENOUS
Status: DISCONTINUED | OUTPATIENT
Start: 2025-04-16 | End: 2025-04-16

## 2025-04-16 RX ORDER — PHENOBARBITAL SODIUM 65 MG/ML
65 INJECTION, SOLUTION INTRAMUSCULAR; INTRAVENOUS EVERY 8 HOURS
Status: DISCONTINUED | OUTPATIENT
Start: 2025-04-18 | End: 2025-04-17 | Stop reason: HOSPADM

## 2025-04-16 RX ORDER — DEXMEDETOMIDINE HYDROCHLORIDE 4 UG/ML
.1-1.5 INJECTION, SOLUTION INTRAVENOUS CONTINUOUS
Status: DISCONTINUED | OUTPATIENT
Start: 2025-04-16 | End: 2025-04-17 | Stop reason: HOSPADM

## 2025-04-16 RX ORDER — LORAZEPAM 2 MG/ML
0.5 INJECTION INTRAMUSCULAR EVERY 2 HOUR PRN
Status: DISCONTINUED | OUTPATIENT
Start: 2025-04-16 | End: 2025-04-17 | Stop reason: HOSPADM

## 2025-04-16 RX ORDER — SODIUM CHLORIDE, SODIUM LACTATE, POTASSIUM CHLORIDE, CALCIUM CHLORIDE 600; 310; 30; 20 MG/100ML; MG/100ML; MG/100ML; MG/100ML
100 INJECTION, SOLUTION INTRAVENOUS CONTINUOUS
Status: CANCELLED | OUTPATIENT
Start: 2025-04-16 | End: 2025-04-17

## 2025-04-16 RX ORDER — VANCOMYCIN HYDROCHLORIDE 1 G/20ML
INJECTION, POWDER, LYOPHILIZED, FOR SOLUTION INTRAVENOUS DAILY PRN
Status: DISCONTINUED | OUTPATIENT
Start: 2025-04-16 | End: 2025-04-17 | Stop reason: HOSPADM

## 2025-04-16 RX ORDER — LORAZEPAM 2 MG/ML
2 INJECTION INTRAMUSCULAR EVERY 2 HOUR PRN
Status: DISCONTINUED | OUTPATIENT
Start: 2025-04-16 | End: 2025-04-17 | Stop reason: HOSPADM

## 2025-04-16 RX ORDER — PHENOBARBITAL SODIUM 65 MG/ML
65 INJECTION, SOLUTION INTRAMUSCULAR; INTRAVENOUS EVERY 6 HOURS PRN
Status: DISCONTINUED | OUTPATIENT
Start: 2025-04-16 | End: 2025-04-17 | Stop reason: HOSPADM

## 2025-04-16 RX ORDER — ENOXAPARIN SODIUM 100 MG/ML
40 INJECTION SUBCUTANEOUS EVERY 12 HOURS SCHEDULED
Status: DISCONTINUED | OUTPATIENT
Start: 2025-04-16 | End: 2025-04-17 | Stop reason: HOSPADM

## 2025-04-16 RX ORDER — THIAMINE HYDROCHLORIDE 100 MG/ML
500 INJECTION, SOLUTION INTRAMUSCULAR; INTRAVENOUS EVERY 8 HOURS SCHEDULED
Status: DISCONTINUED | OUTPATIENT
Start: 2025-04-16 | End: 2025-04-17 | Stop reason: HOSPADM

## 2025-04-16 RX ORDER — ENOXAPARIN SODIUM 100 MG/ML
40 INJECTION SUBCUTANEOUS EVERY 12 HOURS SCHEDULED
Status: CANCELLED | OUTPATIENT
Start: 2025-04-17

## 2025-04-16 RX ORDER — MULTIVIT-MIN/IRON FUM/FOLIC AC 7.5 MG-4
1 TABLET ORAL DAILY
Status: DISCONTINUED | OUTPATIENT
Start: 2025-04-16 | End: 2025-04-16

## 2025-04-16 RX ORDER — LANOLIN ALCOHOL/MO/W.PET/CERES
100 CREAM (GRAM) TOPICAL DAILY
Status: DISCONTINUED | OUTPATIENT
Start: 2025-04-19 | End: 2025-04-17 | Stop reason: HOSPADM

## 2025-04-16 RX ADMIN — PROPOFOL 30 MCG/KG/MIN: 10 INJECTION, EMULSION INTRAVENOUS at 11:53

## 2025-04-16 RX ADMIN — DEXTROSE MONOHYDRATE 750 MG: 50 INJECTION, SOLUTION INTRAVENOUS at 05:59

## 2025-04-16 RX ADMIN — Medication 50 MCG/HR: at 13:00

## 2025-04-16 RX ADMIN — HEPARIN SODIUM 800 UNITS/HR: 10000 INJECTION, SOLUTION INTRAVENOUS at 06:41

## 2025-04-16 RX ADMIN — CEFTRIAXONE SODIUM 2 G: 2 INJECTION, SOLUTION INTRAVENOUS at 11:24

## 2025-04-16 RX ADMIN — PROPOFOL 25 MCG/KG/MIN: 10 INJECTION, EMULSION INTRAVENOUS at 04:25

## 2025-04-16 RX ADMIN — CEFTRIAXONE SODIUM 2 G: 2 INJECTION, SOLUTION INTRAVENOUS at 23:10

## 2025-04-16 RX ADMIN — ASPIRIN 300 MG: 300 SUPPOSITORY RECTAL at 08:11

## 2025-04-16 RX ADMIN — Medication 100 PERCENT: at 14:00

## 2025-04-16 RX ADMIN — THIAMINE HYDROCHLORIDE 500 MG: 100 INJECTION, SOLUTION INTRAMUSCULAR; INTRAVENOUS at 05:59

## 2025-04-16 RX ADMIN — DEXMEDETOMIDINE HYDROCHLORIDE 1.5 MCG/KG/HR: 400 INJECTION INTRAVENOUS at 21:57

## 2025-04-16 RX ADMIN — PHENOBARBITAL SODIUM 97.5 MG: 65 INJECTION INTRAMUSCULAR at 19:28

## 2025-04-16 RX ADMIN — LORAZEPAM 1 MG: 2 INJECTION INTRAMUSCULAR; INTRAVENOUS at 13:03

## 2025-04-16 RX ADMIN — DEXMEDETOMIDINE HYDROCHLORIDE 1.5 MCG/KG/HR: 400 INJECTION INTRAVENOUS at 23:46

## 2025-04-16 RX ADMIN — PROPOFOL 25 MCG/KG/MIN: 10 INJECTION, EMULSION INTRAVENOUS at 15:09

## 2025-04-16 RX ADMIN — Medication 90 PERCENT: at 15:35

## 2025-04-16 RX ADMIN — POTASSIUM CHLORIDE 40 MEQ: 149 INJECTION, SOLUTION, CONCENTRATE INTRAVENOUS at 09:59

## 2025-04-16 RX ADMIN — PANTOPRAZOLE SODIUM 40 MG: 40 INJECTION, POWDER, FOR SOLUTION INTRAVENOUS at 08:11

## 2025-04-16 RX ADMIN — ENOXAPARIN SODIUM 40 MG: 40 INJECTION SUBCUTANEOUS at 16:10

## 2025-04-16 RX ADMIN — SODIUM CHLORIDE, SODIUM LACTATE, POTASSIUM CHLORIDE, AND CALCIUM CHLORIDE 150 ML: .6; .31; .03; .02 INJECTION, SOLUTION INTRAVENOUS at 16:20

## 2025-04-16 RX ADMIN — LORAZEPAM 2 MG: 2 INJECTION INTRAMUSCULAR; INTRAVENOUS at 13:53

## 2025-04-16 RX ADMIN — THIAMINE HYDROCHLORIDE 500 MG: 100 INJECTION, SOLUTION INTRAMUSCULAR; INTRAVENOUS at 16:10

## 2025-04-16 RX ADMIN — AMPICILLIN SODIUM 2 G: 2 INJECTION, POWDER, FOR SOLUTION INTRAMUSCULAR; INTRAVENOUS at 21:57

## 2025-04-16 RX ADMIN — Medication 50 PERCENT: at 15:00

## 2025-04-16 RX ADMIN — DEXMEDETOMIDINE HYDROCHLORIDE 1.5 MCG/KG/HR: 400 INJECTION INTRAVENOUS at 15:08

## 2025-04-16 RX ADMIN — DEXMEDETOMIDINE HYDROCHLORIDE 1.5 MCG/KG/HR: 400 INJECTION INTRAVENOUS at 18:40

## 2025-04-16 RX ADMIN — ACYCLOVIR SODIUM 750 MG: 50 INJECTION, SOLUTION INTRAVENOUS at 16:10

## 2025-04-16 RX ADMIN — DEXMEDETOMIDINE HYDROCHLORIDE 1.5 MCG/KG/HR: 400 INJECTION INTRAVENOUS at 16:25

## 2025-04-16 RX ADMIN — PHENOBARBITAL SODIUM 97.5 MG: 65 INJECTION INTRAMUSCULAR at 12:04

## 2025-04-16 RX ADMIN — PROPOFOL 25 MCG/KG/MIN: 10 INJECTION, EMULSION INTRAVENOUS at 15:42

## 2025-04-16 RX ADMIN — PROPOFOL 25 MCG/KG/MIN: 10 INJECTION, EMULSION INTRAVENOUS at 20:22

## 2025-04-16 RX ADMIN — DEXMEDETOMIDINE HYDROCHLORIDE 0.1 MCG/KG/HR: 400 INJECTION INTRAVENOUS at 12:07

## 2025-04-16 RX ADMIN — Medication 50 PERCENT: at 07:44

## 2025-04-16 RX ADMIN — DOXYCYCLINE 100 MG: 100 INJECTION, POWDER, LYOPHILIZED, FOR SOLUTION INTRAVENOUS at 11:24

## 2025-04-16 RX ADMIN — AMPICILLIN SODIUM 2 G: 2 INJECTION, POWDER, FOR SOLUTION INTRAMUSCULAR; INTRAVENOUS at 16:10

## 2025-04-16 RX ADMIN — DEXMEDETOMIDINE HYDROCHLORIDE 1.5 MCG/KG/HR: 400 INJECTION INTRAVENOUS at 20:10

## 2025-04-16 RX ADMIN — SODIUM CHLORIDE, SODIUM LACTATE, POTASSIUM CHLORIDE, AND CALCIUM CHLORIDE 150 ML: .6; .31; .03; .02 INJECTION, SOLUTION INTRAVENOUS at 09:59

## 2025-04-16 RX ADMIN — DEXTROSE MONOHYDRATE 750 MG: 50 INJECTION, SOLUTION INTRAVENOUS at 18:40

## 2025-04-16 RX ADMIN — PROPOFOL 30 MCG/KG/MIN: 10 INJECTION, EMULSION INTRAVENOUS at 08:10

## 2025-04-16 RX ADMIN — Medication 90 PERCENT: at 20:27

## 2025-04-16 RX ADMIN — VANCOMYCIN HYDROCHLORIDE 1750 MG: 10 INJECTION, POWDER, LYOPHILIZED, FOR SOLUTION INTRAVENOUS at 16:26

## 2025-04-16 RX ADMIN — SODIUM CHLORIDE, SODIUM LACTATE, POTASSIUM CHLORIDE, AND CALCIUM CHLORIDE 500 ML: .6; .31; .03; .02 INJECTION, SOLUTION INTRAVENOUS at 04:01

## 2025-04-16 ASSESSMENT — LIFESTYLE VARIABLES
PAROXYSMAL SWEATS: NO SWEAT VISIBLE
AGITATION: NORMAL ACTIVITY
NAUSEA AND VOMITING: NO NAUSEA AND NO VOMITING
NAUSEA AND VOMITING: NO NAUSEA AND NO VOMITING
PAROXYSMAL SWEATS: NO SWEAT VISIBLE
TREMOR: NO TREMOR
ANXIETY: NO ANXIETY, AT EASE
AGITATION: NORMAL ACTIVITY
AGITATION: NORMAL ACTIVITY
NAUSEA AND VOMITING: NO NAUSEA AND NO VOMITING
TREMOR: NO TREMOR
PAROXYSMAL SWEATS: NO SWEAT VISIBLE
TREMOR: NO TREMOR

## 2025-04-16 ASSESSMENT — PAIN - FUNCTIONAL ASSESSMENT
PAIN_FUNCTIONAL_ASSESSMENT: CPOT (CRITICAL CARE PAIN OBSERVATION TOOL)
PAIN_FUNCTIONAL_ASSESSMENT: CPOT (CRITICAL CARE PAIN OBSERVATION TOOL)

## 2025-04-16 ASSESSMENT — ACTIVITIES OF DAILY LIVING (ADL): LACK_OF_TRANSPORTATION: NO

## 2025-04-16 NOTE — ASSESSMENT & PLAN NOTE
64 year old man with PMH of atrial Fibrillation (on Eliquis), HFpEF, epilepsy, HTN, DM type II, alcohol use disorder with acute hypoxemic respiratory failure, breakthrough seizures, left lower lobe pneumonia, alcohol withdrawal syndrome.     1) Acute hypoxemic respiratory failure secondary to pneumonia (gram positives) and acute alcohol withdrawal syndrome  --Continue with mechanical ventilation with lung protective strategy. T  --Antibiotic regimen: Ceftriaxone, vancomycin, acyclovir,   --Cultures reviewed: Gram positives on sputum  --Spontaneous breathing trial not performed as the patient had seizures while off propofol    2) Alcohol withdrawal syndrome with delirium tremens  --Management with phenobabital and benzodiazepines  --Thiamine high dose for management of Wernicke encephalopathy    3) Epilepsy with breakthorugh seizures  --Management with benzodiazepines and propofol  --Broadened antimicrobial spectrum to cover meningoencephalitis (ceftriaxone, vancomycin, acyclovir and ampicillin)  --CT head ordered as patient was receiving IV heparin infusion     4) JARVIS   --Most likely hypovolemic, receiving IV hydration     5) Type 2 AMI vs NSTEMI  --No focal motion abnormalities on echocardiogram  --Anticoagulation with heparin infusion and aspirin    6) Atrial fibrillation   --Anticoagulation with heparin infusion    7) Prolonged Qtc  --Avoid medications that prolong QTc    DVT prophylaxis - Anticoagulated with heparin infusion   Nutrition: Tube feedings not started, unable to insert OG  Vascular catheters:  Peripheral lines  Urinary catheter needed for strict input/output

## 2025-04-16 NOTE — CONSULTS
Vancomycin Dosing by Pharmacy- INITIAL    Elvis Masters is a 64 y.o. year old male who Pharmacy has been consulted for vancomycin dosing for CNS/meningoencephalitis. Based on the patient's indication and renal status this patient will be dosed based on traditional trough based dosing due to Scr increase of more than 50% with vancomycin goal of 15-20    Renal function is currently declining.    Visit Vitals  /65   Pulse 108   Temp 37.5 °C (99.5 °F)   Resp 20        Lab Results   Component Value Date    CREATININE 2.68 (H) 2025    CREATININE 1.23 04/15/2025    CREATININE 0.60 04/15/2025    CREATININE 0.90 10/15/2024        Patient weight is as follows:   Vitals:    25 0600   Weight: (!) 157 kg (345 lb 0.3 oz)       Cultures:  No results found for the encounter in last 14 days.        I/O last 3 completed shifts:  In: 2418.6 (15.5 mL/kg) [I.V.:870.2 (5.6 mL/kg); IV Piggyback:1548.3]  Out: 345 (2.2 mL/kg) [Urine:345 (0.1 mL/kg/hr)]  Weight: 156.5 kg   I/O during current shift:  I/O this shift:  In: 209.1 [I.V.:209.1]  Out: -     Temp (24hrs), Av.2 °C (98.9 °F), Min:36.9 °C (98.4 °F), Max:37.5 °C (99.5 °F)         Assessment/Plan     Patient has already been given a loading dose of 2000 mg.  Patient was given dose 4/15; ordered add on vanco lab due to patient's poor renal function   Level on  was 12.5  Ordered a one time dose of vancomycin 1750mg on  with a level for  at 1400  Will continue to monitor renal function daily while on vancomycin and order serum creatinine at least every 48 hours if not already ordered.  Follow for continued vancomycin needs, clinical response, and signs/symptoms of toxicity.       Kailey Steen, PharmD

## 2025-04-16 NOTE — CARE PLAN
The patient's goals for the shift include  remain free from injury    The clinical goals for the shift include   Over the shift, the patient did not make progress toward the following goals. Barriers to progression include pt ventialted on sedaton @ this time. Recommendations to address these barriers include sedation holiday.

## 2025-04-16 NOTE — PROGRESS NOTES
"Elvis Masters is a 64 y.o. male on day 1 of admission presenting with Seizure (Multi).      Subjective   Writer of this note was able to observe patient while off of IV sedation (approximately 5 minutes in duration, possibly longer given 1 of patient's peripheral IVs blew and patient was not actually receiving medication while bedside RN was attempting to restart drip).  Patient was out-of-control, kicking his legs in the air/out of bed, pulling against his bilateral wrist restraints, biting/gagging against his endotracheal tube, and mumbling.  At approximately 1345, patient was observed extremely hypertensive, with , hypoxic at 80% despite being on FiO2 100%, and in an SVT rhythm, with .  Seizure activity was identified also at this time, with pupils fixed, head-bobbing, and leg shaking.  This behavior continued for approximately 2 minutes before terminating status post administration of Ativan 2 mg IV push.     EEG completed today while patient was still significantly sedated with propofol, fentanyl, and Precedex.  Findings are suggestive of severe encephalopathy without any seizure activity identified.  Given what was witnessed at the bedside, the neurology team feels that it is in the best interest of the patient to have him transferred to a higher level of care to where he can undergo continuous EEG monitoring while attempting to safely come off of sedation for treatment of DTs.  ICU team has been notified.     Objective     Last Recorded Vitals  Blood pressure 105/65, pulse 108, temperature 37.5 °C (99.5 °F), resp. rate 20, height 1.803 m (5' 11\"), weight (!) 157 kg (345 lb 0.3 oz), SpO2 93%.    Physical exam/neurological exam  Writer of this note was able to observe patient while off of IV sedation (approximately 5 minutes in duration, possibly longer given 1 of patient's peripheral IVs blew and patient was not actually receiving medication while bedside RN was attempting to restart drip).  " Patient was out-of-control, kicking his legs in the air/out of bed, pulling against his bilateral wrist restraints, biting/gagging against his endotracheal tube, and mumbling.  At approximately 1345, patient was observed extremely hypertensive, with , hypoxic at 80% despite being on FiO2 100%, and in an SVT rhythm, with .  Seizure activity was identified also at this time, with pupils fixed, head-bobbing, and leg shaking.  This behavior continued for approximately 2 minutes before terminating status post administration of Ativan 2 mg IV push.     Relevant Results  Scheduled medications  Scheduled Medications[1]  Continuous medications  Continuous Medications[2]  PRN medications  PRN Medications[3]  ECG 12 lead  Result Date: 4/16/2025  Sinus tachycardia Multiple ventricular premature complexes Nonspecific T abnormalities, anterior leads Prolonged QT interval    ECG 12 lead  Result Date: 4/16/2025  Sinus tachycardia RBBB and LPFB    Electrocardiogram, 12-lead PRN ACS symptoms  Result Date: 4/16/2025   Suspect arm lead reversal, interpretation assumes no reversal Sinus tachycardia Right bundle branch block Left posterior fascicular block  Bifascicular block  Abnormal ECG    EEG  IMPRESSION Impression This routine EEG is indicative of a severe diffuse encephalopathy. No epileptiform discharges or lateralizing signs are seen. A full report will be scanned into the patient's chart at a later time. This report has been interpreted and electronically signed by    Electrocardiogram, 12-lead PRN ACS symptoms  Result Date: 4/16/2025  Sinus tachycardia Ventricular premature complex Repol abnrm suggests ischemia, anterolateral    XR chest 1 view  Result Date: 4/16/2025  Interpreted By:  Javier Jhaveri, STUDY: XR CHEST 1 VIEW;  4/16/2025 7:39 am   INDICATION: Signs/Symptoms:Intubated, left lower lobe pneumonia.   COMPARISON: 04/15/2025   ACCESSION NUMBER(S): [id number]   ORDERING CLINICIAN: DINESH MINA    FINDINGS: CARDIOMEDIASTINAL SILHOUETTE AND VASCULATURE:   Cardiac size:  Probable upper limits of normal considering a shallow inspiration. Aortic shadow:  Within normal limits.   Mediastinal contours: Within normal limits.   Pulmonary vasculature:  The central vasculature is unremarkable   LUNGS: Improvement of left infrahilar atelectasis. There has also been improvement and near resolution of prominent left bronchovascular and interstitial markings that was probably from congestion.   ABDOMEN AND OTHER FINDINGS: An NG catheter is present, but the distal aspect not well visualized due to underpenetration. An endotracheal tube again terminates at just below the thoracic inlet.   BONES: No acute osseous changes.       1.  Improvement of congestion and left infrahilar atelectasis or infiltrate.   Signed by: Javier Jhaveri 4/16/2025 8:40 AM Dictation workstation:   [id number]    Transthoracic Echo (TTE) Complete  Result Date: 4/15/2025   [facility], [address] [place] [address]           Tel [phone] and Fax [phone] TRANSTHORACIC ECHOCARDIOGRAM REPORT  Patient Name:       EMELIA GALVAN       Reading Physician:    [id number] Vargas Anderson MD Study Date:         4/15/2025           Ordering Provider:    [id number] DINESH MINA MRN/PID:            [id number]            Fellow: Accession#:         [id number]        Nurse: Date of Birth/Age:  1960 / 64 years Sonographer:          Vanna Curry RDCS Gender assigned at  M                   Additional Staff: Birth: Height:             180.34 cm           Admit Date:           4/15/2025 Weight:             153.32 kg           Admission Status:     Inpatient - STAT BSA / BMI:          2.64 m2 / 47.14     Encounter#:           [id number]                     kg/m2  Blood Pressure:     106/57 mmHg         Department Location:  [address]                                                               floor/ICU Study Type:    TRANSTHORACIC ECHO (TTE) COMPLETE Diagnosis/ICD: Ventricular tachycardia, unspecified-I47.20; Unspecified atrial                fibrillation-I48.91 Indication:    Seizure CPT Code:      Echo Complete w Full Doppler-00611 Patient History: Pertinent History: HTN, A-Fib and Hyperlipidemia. ETOH Disorder, KAYODE. Study Detail: The following Echo studies were performed: 2D, M-Mode, Doppler and               color flow. Technically challenging study due to patient lying in               supine position and body habitus. The patient is intubated.               Definity used as a contrast agent for endocardial border               definition. Total contrast used for this procedure was 3 mL via IV               push. Unable to obtain subcostal and suprasternal notch view.  PHYSICIAN INTERPRETATION: Left Ventricle: Left ventricular ejection fraction is low normal, by visual estimate at 50-55%. There are no regional left ventricular wall motion abnormalities. The left ventricular cavity size is normal. There is mildly increased septal and mildly increased posterior left ventricular wall thickness. There is left ventricular concentric remodeling. Spectral Doppler shows a Grade I (impaired relaxation pattern) of left ventricular diastolic filling with normal left atrial filling pressure. Left Atrium: The left atrial size is normal. Right Ventricle: The right ventricle is normal in size. There is normal right ventricular global systolic function. Right Atrium: The right atrium is normal in size. Aortic Valve: The aortic valve is trileaflet. There is no evidence of aortic valve regurgitation. The peak instantaneous gradient of the aortic valve is 8 mmHg. Mitral Valve: The mitral valve is normal in structure. There is trace mitral valve regurgitation. Tricuspid Valve: The  tricuspid valve is structurally normal. There is trace tricuspid regurgitation. Pulmonic Valve: The pulmonic valve is structurally normal. There is trace pulmonic valve regurgitation. Pericardium: There is no pericardial effusion noted. Aorta: The aortic root is normal. There is moderate dilatation of the ascending aorta. There is mild dilatation of the aortic root.  CONCLUSIONS:  1. Left ventricular ejection fraction is low normal, by visual estimate at 50-55%.  2. Spectral Doppler shows a Grade I (impaired relaxation pattern) of left ventricular diastolic filling with normal left atrial filling pressure.  3. There is normal right ventricular global systolic function.  4. There is moderate dilatation of the ascending aorta. QUANTITATIVE DATA SUMMARY:  2D MEASUREMENTS:          Normal Ranges: Ao Root d:       3.60 cm  (2.0-3.7cm) LAs:             4.70 cm  (2.7-4.0cm) IVSd:            1.32 cm  (0.6-1.1cm) LVPWd:           1.23 cm  (0.6-1.1cm) LVIDd:           3.81 cm  (3.9-5.9cm) LVIDs:           2.68 cm LV Mass Index:   64 g/m2 LVEDV Index:     45 ml/m2 LV % FS          29.7 %  LEFT ATRIUM:                  Normal Ranges: LA Vol A4C:        51.8 ml    (22+/-6mL/m2) LA Vol A2C:        41.6 ml LA Vol BP:         52.6 ml LA Vol Index A4C:  19.7ml/m2 LA Vol Index A2C:  15.8 ml/m2 LA Vol Index BP:   20.0 ml/m2 LA Area A4C:       19.6 cm2 LA Area A2C:       19.9 cm2 LA Major Axis A4C: 6.3 cm LA Major Axis A2C: 8.1 cm LA Vol A4C:        50.6 ml LA Vol A2C:        44.9 ml LA Vol Index BSA:  18.1 ml/m2  RIGHT ATRIUM:          Normal Ranges: RA Area A4C:  14.0 cm2  M-MODE MEASUREMENTS:         Normal Ranges: Ao Root:             3.40 cm (2.0-3.7cm) LAs:                 4.60 cm (2.7-4.0cm)  AORTA MEASUREMENTS:         Normal Ranges: Asc Ao, d:          4.10 cm (2.1-3.4cm)  LV SYSTOLIC FUNCTION:                      Normal Ranges: EF-A4C View:    65 % (>=55%) EF-A2C View:    44 % EF-Biplane:     55 % EF-Visual:      53 % LV EF  Reported: 53 %  LV DIASTOLIC FUNCTION:          Normal Ranges: MV medial e'           0.09 m/s  AORTIC VALVE:           Normal Ranges: AoV Vmax:      1.41 m/s (<=1.7m/s) AoV Peak P.0 mmHg (<20mmHg) LVOT Max Shine:  1.10 m/s (<=1.1m/s) LVOT VTI:      12.20 cm LVOT Diameter: 2.30 cm  (1.8-2.4cm) AoV Area,Vmax: 3.24 cm2 (2.5-4.5cm2)  RIGHT VENTRICLE: RV Basal 2.80 cm RV Mid   2.21 cm TAPSE:   21.3 mm RV s'    0.13 m/s  PULMONIC VALVE:            Normal Ranges: RVOT diam: systole 6.30 cm (1.7-2.3cm)  [id number] Vargas Anderson MD Electronically signed on 4/15/2025 at 9:43:47 AM  ** Final **     CT head wo IV contrast  Result Date: 4/15/2025  Interpreted By:  Che Jones, STUDY: CT HEAD WO IV CONTRAST;  4/15/2025 5:03 am   INDICATION: Signs/Symptoms:Concern of status epilepticus.   COMPARISON: 10/15/2024   ACCESSION NUMBER(S): [id number]   ORDERING CLINICIAN: TREE CORONA   TECHNIQUE: Axial noncontrast CT images of the head.   FINDINGS: BRAIN PARENCHYMA: Gray-white matter interfaces are preserved. No mass effect or midline shift. Generalized parenchymal volume loss noted with concordant ventricular enlargement. Non-specific white matter changes noted, which may be related to small vessel disease.   HEMORRHAGE: No acute intracranial hemorrhage. VENTRICLES and EXTRA-AXIAL SPACES: The ventricles, sulci and basal cisterns enlarged, concordant with parenchymal volume loss. EXTRACRANIAL SOFT TISSUES: Within normal limits. PARANASAL SINUSES/MASTOIDS: Mucosal thickening with scattered debris in the paranasal sinuses. Probable right posterior ethmoidal osteoma. Mastoid air cells are patent. CALVARIUM: No depressed skull fracture. No destructive osseous lesion.   OTHER FINDINGS: None.       No acute intracranial hemorrhage or mass effect.     MACRO: None.   Signed by: Che Jones 4/15/2025 6:07 AM Dictation workstation:   [id number]    XR chest 1 view  Result Date: 4/15/2025  STUDY: Chest Radiograph, One View;  4/15/2025 at 4:45am INDICATION: Intubation. COMPARISON: None Available ACCESSION NUMBER(S): [id number] ORDERING CLINICIAN: TREE CORONA TECHNIQUE:  Frontal chest was obtained at 4:45 hours. FINDINGS: CARDIOMEDIASTINAL SILHOUETTE: Cardiomediastinal silhouette is normal in size and configuration. Enteric tube tip is poorly visualized distally in may terminate in the lower esophagus, indeterminate.  LUNGS: Endotracheal tube is adequately positioned. Increased interstitial markings are seen bilaterally.  There is diminished inspiration with suggestion of left infrahilar airspace opacity.  Right suprahilar airspace disease is also noted along the right side of the mediastinum. ABDOMEN: No remarkable upper abdominal findings.  BONES: No acute osseous changes.    Diminished inspiration with increased interstitial markings bilaterally, likely mild pulmonary vascular congestion. Right suprahilar and left infrahilar airspace opacities which may indicate atelectasis or pneumonia in the appropriate clinical setting. Indeterminate positioning of the enteric tube with the distal end poorly visualized overlying the lower mediastinum.  If needed upper abdominal radiograph could be considered to verify positioning. Signed by David Pepe    Results for orders placed or performed during the hospital encounter of 04/15/25 (from the past 24 hours)   Troponin I, High Sensitivity   Result Value Ref Range    Troponin I, High Sensitivity 241 (HH) 0 - 20 ng/L   Heparin Assay   Result Value Ref Range    Heparin Unfractionated 0.6 See Comment Below for Therapeutic Ranges IU/mL   Heparin Assay   Result Value Ref Range    Heparin Unfractionated 0.6 See Comment Below for Therapeutic Ranges IU/mL   PST Top   Result Value Ref Range    Extra Tube Hold for add-ons.    Respiratory Culture/Smear    Specimen: Tracheal Aspirate; Fluid   Result Value Ref Range    Gram Stain (1+) Rare Polymorphonuclear leukocytes (A)     Gram Stain (3+) Moderate Mixed  Gram positive bacteria (A)    Magnesium   Result Value Ref Range    Magnesium 2.41 (H) 1.60 - 2.40 mg/dL   CBC and Auto Differential   Result Value Ref Range    WBC 7.8 4.4 - 11.3 x10*3/uL    nRBC 0.3 (H) 0.0 - 0.0 /100 WBCs    RBC 4.40 (L) 4.50 - 5.90 x10*6/uL    Hemoglobin 12.9 (L) 13.5 - 17.5 g/dL    Hematocrit 42.3 41.0 - 52.0 %    MCV 96 80 - 100 fL    MCH 29.3 26.0 - 34.0 pg    MCHC 30.5 (L) 32.0 - 36.0 g/dL    RDW 15.7 (H) 11.5 - 14.5 %    Platelets 196 150 - 450 x10*3/uL    Neutrophils % 76.3 40.0 - 80.0 %    Immature Granulocytes %, Automated 0.8 0.0 - 0.9 %    Lymphocytes % 15.3 13.0 - 44.0 %    Monocytes % 6.4 2.0 - 10.0 %    Eosinophils % 0.8 0.0 - 6.0 %    Basophils % 0.4 0.0 - 2.0 %    Neutrophils Absolute 5.94 1.20 - 7.70 x10*3/uL    Immature Granulocytes Absolute, Automated 0.06 0.00 - 0.70 x10*3/uL    Lymphocytes Absolute 1.19 (L) 1.20 - 4.80 x10*3/uL    Monocytes Absolute 0.50 0.10 - 1.00 x10*3/uL    Eosinophils Absolute 0.06 0.00 - 0.70 x10*3/uL    Basophils Absolute 0.03 0.00 - 0.10 x10*3/uL   Renal function panel   Result Value Ref Range    Glucose 90 74 - 99 mg/dL    Sodium 136 136 - 145 mmol/L    Potassium 3.4 (L) 3.5 - 5.3 mmol/L    Chloride 94 (L) 98 - 107 mmol/L    Bicarbonate 25 21 - 32 mmol/L    Anion Gap 20 10 - 20 mmol/L    Urea Nitrogen 12 6 - 23 mg/dL    Creatinine 2.68 (H) 0.50 - 1.30 mg/dL    eGFR 26 (L) >60 mL/min/1.73m*2    Calcium 8.6 8.6 - 10.3 mg/dL    Phosphorus 6.3 (H) 2.5 - 4.9 mg/dL    Albumin 3.8 3.4 - 5.0 g/dL   Heparin Assay, UFH   Result Value Ref Range    Heparin Unfractionated 0.4 See Comment Below for Therapeutic Ranges IU/mL   Electrocardiogram, 12-lead PRN ACS symptoms   Result Value Ref Range    Ventricular Rate 99 BPM    Atrial Rate 99 BPM    TN Interval 181 ms    QRS Duration 102 ms    QT Interval 351 ms    QTC Calculation(Bazett) 451 ms    P Axis 50 degrees    R Axis 75 degrees    T Axis 14 degrees    QRS Count 16 beats    Q Onset 254 ms    T Offset 430 ms     QTC Fredericia 414 ms   ECG 12 lead   Result Value Ref Range    Ventricular Rate 105 BPM    Atrial Rate 105 BPM    DE Interval 177 ms    QRS Duration 107 ms    QT Interval 384 ms    QTC Calculation(Bazett) 508 ms    P Axis 53 degrees    R Axis 75 degrees    T Axis 28 degrees    QRS Count 16 beats    Q Onset 253 ms    T Offset 445 ms    QTC Fredericia 462 ms     EEG  Result Date: 4/16/2025  IMPRESSION Impression This routine EEG is indicative of a severe diffuse encephalopathy. No epileptiform discharges or lateralizing signs are seen. A full report will be scanned into the patient's chart at a later time. This report has been interpreted and electronically signed by                        Lilibeth Coma Scale  Best Eye Response: None  Best Verbal Response: None  Best Motor Response: Withdraws to pain  Lilibeth Coma Scale Score: 6                              Assessment & Plan  Seizure (Multi)    Alcohol withdrawal delirium, acute, hyperactive (Multi)    Morbid obesity with BMI of 40.0-44.9, adult (Multi)    - At this time it is recommended for patient to be transferred to a higher level of care in order to undergo continuous EEG monitoring.  Patient was observed experiencing seizure activity while on a sedation vacation for no more than 10 minutes in duration.  Patient was profoundly agitated prior to seizure activity being observed during this time, and did require administration of Ativan 2 mg IV to abort seizure activity.  EEG completed earlier today while patient was receiving propofol, fentanyl, and Versed drips, with final read suggestive of severe encephalopathy without any seizure activity.  Continue close seizure precautions in this patient while hospitalized.   -Given current state of JARVIS, we will defer from adjustment of Keppra at this time.  Please continue this medication at 750 mg IV twice daily for seizure prophylaxis.   -Continue CIWA protocol to assist with DTs, which patient appears to be actively in.   Consider discontinuation of as needed Ativan and instead initiate Versed gtt in lieu of current propofol drip.  Continue prescribed phenobarbital taper, fentanyl drip, and Precedex drip.  -Continue promotion of lifestyle modifications, such as: Strict BP and glycemic control, dietary habit changes, incorporation of daily exercise regimen, adherence to all prescription/OTC medication schedules, attendance to all follow-up appointments, cessation from smoking if applicable, abstinence from alcohol and illicit drug use if applicable  - Patient to follow-up with outpatient neurology NP Bettie Pardo with [facility] within 1 month of discharge.    I spent 30 minutes in the professional and overall care of this patient.      PORTER Roche-CNP      Attending physician note:  Patient discussed in detail with resident physician whom evaluated patient in the morning and with writer of this note.  Sedation could not be held for EEG due to agitation.  EEG did not show seizure activity while fully sedated however when sedation was held for about 5 minutes for exam, seizure activity was witnessed. Patient is currently sedated with propofol and receiving phenobarbital for alcohol withdrawal. Benzodiazapine are typically first line for alcohol withdrawal seizures. Would recommend to switch propofol drip to versed drip.  Recommend patient to be transferred to facility with continuous EEG monitoring given recurrent seizures and the inability to get another EEG at this facility until Friday.      Annalise Allen,        [1] acyclovir, 10 mg/kg (Ideal), intravenous, q12h  [Held by provider] aspirin, 81 mg, orogastric tube, Daily  aspirin, 300 mg, rectal, Daily  cefTRIAXone, 2 g, intravenous, q12h  doxycycline, 100 mg, intravenous, q12h  enoxaparin, 40 mg, subcutaneous, q12h AURY  folic acid, 1 mg, orogastric tube, Daily  levETIRAcetam, 750 mg, intravenous, q12h  multivitamin with minerals, 1 tablet, oral, Daily  oxygen, ,  inhalation, Continuous - Inhalation  pantoprazole, 40 mg, intravenous, Daily  perflutren protein A microsphere, 0.5 mL, intravenous, Once in imaging  PHENobarbital, 97.5 mg, intravenous, q8h   Followed by  [START ON 4/18/2025] PHENobarbital, 65 mg, intravenous, q8h   Followed by  [START ON 4/20/2025] PHENobarbital, 32.5 mg, intravenous, q8h  sulfur hexafluoride microsphr, 2 mL, intravenous, Once in imaging  [START ON 4/19/2025] thiamine, 100 mg, oral, Daily  thiamine, 500 mg, intravenous, q8h AURY  [2] dexmedeTOMIDine, 0.1-1.5 mcg/kg/hr, Last Rate: 0.2 mcg/kg/hr (04/16/25 1243)  fentaNYL, 0-200 mcg/hr, Last Rate: 50 mcg/hr (04/16/25 1300)  lactated Ringer's, 150 mL  propofol, 0-50 mcg/kg/min, Last Rate: 20 mcg/kg/min (04/16/25 1242)  [3] PRN medications: LORazepam **OR** LORazepam **OR** LORazepam, PHENobarbital, PHENobarbital, rocuronium, vancomycin

## 2025-04-16 NOTE — H&P
History Of Present Illness  Elvis Masters is a 64 y.o. L handed  male with PMH significant for Alcohol use disorder, persistent A-fib (Eliquis), seizure (Keppra, unknown etiology), KAYODE (noncompliant with CPAP), HFpEF, HTN, HLD, pre-DM, who presented to [facility] on 4/15 due to a GTC per mother that occurred at home and lasted roughly 10 minutes. Being transferred for uncontrollable seizures iso Alcohol withdrawal and epilepsy.     Hospital course at [place]:    Patient was postictal throughout transport but was A&O x 2 on arrival and stated his last drink was >36 hours ago.  On observation in the ED he exhibited worsening agitation/restlessness.  Given Benadryl, Haldol, multiple doses of Ativan but the symptoms still remained.  Patient was intubated successfully.  While being monitored in ED he exhibited more seizure-like activity and given additional Ativan. CT head was unremarkable.  Phenobarbital taper started for withdrawal.  Found to have a LL pneumonia and started on empiric antibiotics.      On 4/16: Pateint admitted to MICU, Propofol was weaned off and within 5 minutes had episode of agitation (Patient was out-of-control, kicking his legs in the air/out of bed, pulling against his bilateral wrist restraints, biting/gagging against his endotracheal tube, and mumbling ) an dthen developed sz like activity with pupils fixed, head-bobbing, and leg shaking. This behavior continued for approximately 2 minutes before terminating status post administration of Ativan 2 mg IV push.     Was transferred to [facility] fro continuous EEG monitoring and management of delirium tremons.     In hospital AEDs:   4.5g Keppra load, 750mg  Keppra BID, IV phenobarb, IV precedex, IV fentanyl, IV propofol    EEG 4/16/25: Findings are suggestive of severe encephalopathy without any seizure activity identified.     Disease history:   Years of alcoholism. He began having seizures prior to 2013. Episodes of panicky and out of body feeling years  "prior, then developed convulsions. Not all, but some in setting of withdraw. In 2017 he had an EEG suggestive of frontal lobe epilepsy. He started keppra. The episodes of panic have become far less frequent and he continued to have convulsions periodically. Hands tingling in the morning. Has to shake them out     Seen by Neurology at [facility] on 6/26/24:  \"   Interval History since last seen 8/2022  no seizures. no side effects. compliant with meds. he has been in and out of treatment for alcohol. in [facility] he has been out a week. - continued on Keppra 1500mg BID\"    SEIZURE TYPES  Seizure Type A:   Aura: none  Seizure Description: feels very tense, feels somewhat out of body and feels panic. Events occurring clusters.   Duration: 10 minutes, but would reoccur in clusters  Frequency: in the past quite often, but not many in recent years  Last one: unknown    Seizure Type B:   Aura: none  Seizure Description: tonic clonic seizure  Duration: minutes   Frequency: maybe 2 in a year   Last one: September 2020     Previous work-up:  MRI HEAD EPILEPSY W/O 07/24/2024   IMPRESSION:   Unchanged nonspecific signal abnormality in the ventral left lingual gyrus and left fusiform gyrus which may represent focal cortical dysplasia or sequelae of remote insult. Low-grade glioma could yield a similar appearance. Correlation with seizure semiology and EEG is recommended.     Routine EEG  7/17/2024  Start Time: 12:04 pm - End Time:  1:07 pm   This Routine awake and drowsy EEG.  performed on a patient in the awake andalert state, was NORMAL.      Current antiepileptic drugs:   Keppra 1500mg twice daily   Also takes,   Gabapentin 600 four times daily (from Dr. Lovett for pain)    Side-effects with current antiepileptic drugs:   none     Prior antiepileptic drugs: (Side effects, reason discontinued)   topamax for weight loss \"    Epilepsy Risk factors: L occipito-temporal lobe FCD, Alcholism    Past Medical " History  Medical History[1]  Surgical History  AMPUTATION, TOE 10/14   accident   ARTHROPLASTY, TOTAL SHOULDER Left 2/19/2019   Procedure: ARTHROPLASTY, SHOULDER left, rtsa ; Surgeon: Renny Duncan MD; Location: PERIOPERATIVE SERVICES; Service: Hand   COLONOSCOPY N/A 4/11/2017   Procedure: COLONOSCOPY, GENERAL ANESTHESIA; Surgeon: Karen Messer MD; Location: [facility]; Service: Gastroenterology   COLONOSCOPY AND ESOPHAGOGASTRODUODENOSCOPY N/A 2/23/2018   Procedure: ESOPHAGOGASTRODUODENOSCOPY AND COLONOSCOPY; Surgeon: Karen Messer MD; Location: [facility]; Service: Gastroenterology   INJ TRANSFORAMINAL,CERV/THOR Right 7/27/2015   Procedure: INJECTION, TRANSFORAMINAL, LUMBAR l4/5 and S1 on the painful side right? May do other levels; Surgeon: Sesar Lovett MD; Location: [facility] [place]; Service: Neurosurgery   INJ TRANSFORAMINAL,CERV/THOR Right 12/14/2015   Procedure: INJECTION, TRANSFORAMINAL, LUMBAR Right L2/3; Surgeon: Sesar Lovett MD; Location: [facility] [place]; Service: Neurosurgery   INJECTION, EPIDURAL, STEROID, LUMBAR 06/17/2015   [facility]   REPAIR, ROTATOR CUFF Left       Social History  Alcohol: Half Gallon per Day   Smoking Status Never   Passive exposure: Never   Smokeless Tobacco Former   Types: Chew     Allergies  Lisinopril  Allergen Reactions   Lisinopril Swelling   Facial swelling  angioedema   Bee Venom Anaphylactic Shock   Pt states allergic to yellow jacket bees.   Other (Review Comments!)   Maple pollen- congestion     Review of Systems  Neurological Exam  Physical Exam  Last Recorded Vitals  There were no vitals taken for this visit.    Relevant Results  Results from last 72 hours   Lab Units 04/16/25  0510 04/15/25  0054   WBC AUTO x10*3/uL 7.8 5.7   HEMOGLOBIN g/dL 12.9* 13.6   HEMATOCRIT % 42.3 41.9   PLATELETS AUTO x10*3/uL 196 213        Results from last 72 hours   Lab Units 04/16/25  0510 04/15/25  0743 04/15/25  0054    SODIUM mmol/L 136 135* 134*   POTASSIUM mmol/L 3.4* 3.6 3.1*   CHLORIDE mmol/L 94* 95* 94*   CO2 mmol/L 25 26 27   BUN mg/dL 12 7 5*   CREATININE mg/dL 2.68* 1.23 0.60   MAGNESIUM mg/dL 2.41* 1.73  --    PHOSPHORUS mg/dL 6.3*  --   --        I have personally reviewed the following imaging results:  Imaging  CT head wo IV contrast  Result Date: 4/16/2025  No acute intracranial process with normal appearance of the unenhanced brain.   Status post bilateral antrostomies with chronic maxillary and sphenoid sinusitis with small mucous retention cyst or polyp in right frontal sinus.   Stable osteoma within right ethmoid sinus.   MACRO: None   Signed by: Heather Marie 4/16/2025 4:00 PM Dictation workstation:   [id number]    XR chest 1 view  Result Date: 4/16/2025  1. Endotracheal and enteric tube placement, as above. 2. Bibasilar airspace opacities, as above. Clinical correlation and continued follow-up until clearing is recommended.   MACRO: None.   Signed by: Alfredo Toney 4/16/2025 3:28 PM Dictation workstation:   [id number]    EEG  Result Date: 4/16/2025  IMPRESSION Impression This routine EEG is indicative of a severe diffuse encephalopathy. No epileptiform discharges or lateralizing signs are seen. A full report will be scanned into the patient's chart at a later time. This report has been interpreted and electronically signed by    XR chest 1 view  Result Date: 4/16/2025  1.  Improvement of congestion and left infrahilar atelectasis or infiltrate.   Signed by: Javier Jhaveri 4/16/2025 8:40 AM Dictation workstation:   [id number]    CT head wo IV contrast  Result Date: 4/15/2025  No acute intracranial hemorrhage or mass effect.     MACRO: None.   Signed by: Che Jones 4/15/2025 6:07 AM Dictation workstation:   [id number]    XR chest 1 view  Result Date: 4/15/2025  Diminished inspiration with increased interstitial markings bilaterally, likely mild pulmonary vascular congestion. Right suprahilar and left  infrahilar airspace opacities which may indicate atelectasis or pneumonia in the appropriate clinical setting. Indeterminate positioning of the enteric tube with the distal end poorly visualized overlying the lower mediastinum.  If needed upper abdominal radiograph could be considered to verify positioning. Signed by David Pepe         Assessment & Plan    Elvis Masters is a 64 y.o. L handed  male with PMH significant for Alcohol use disorder, persistent A-fib (Eliquis), epilepsy,  KAYODE (noncompliant with CPAP), HFpEF, HTN, HLD, pre-DM, who presents as a transfer from  [facility] for uncontrollable seizures iso Alcohol withdrawal and epilepsy. Patient is currently intubated and sedated, will monitor with EEG and treat for alcohol withdrawal.      Assessment/Plan   NEURO:    #Breakthrough sz iso alcohol withdrawal  #Hx of Epilepsy (FCD on L occipitotemoral lobe)  #Delirium tremons>48hrs form last drink  Assessment:  :: EEG 4/16: Severe encephalopathy, no seizures  ::Home AEDs: Keppra 1500mg BID, Gabapentin 600mg QID  - 4/15 Keppra Level 36  Plan:  - NSU  - Neuro Checks: Q1H  - Sedation: IV propofol, IV precedex,   - Pain: IV Fentanyl  -c/w cvEEG - wean propofol as tolerated  - Will continue with IV Keppra 750mg BID  - IV phenobarb - CIWA protocol  - IV thiamine 500mg TID  - Multivitamin  - Seizure and fall precautions  - PT/OT/SLP    CARDIOVASCULAR:  #persistent Afib  #HFpEF  #HTN  #HLD  Assessment:  - On home ellliquis  - ECHO: 4/15: EF 50-55%    Plan:  - Continue to monitor on telemetry  - BP goal: Normotension   - Hold home elliquis, start patient on heparin drip    RESPIRATORY:  #KAYODE  #intubated iso frequent seizures  Assessment:  - Intubated: Will try to wean as tolerated  Plan:  - Continuous pulse oximetry   - O2 PRN to maintain SpO2 > 94%, wean as tolerated  - Ventilator bundle while intubated    RENAL/:  #JARVIS  Assessment:  - Baseline BUN/Cr: 7/1.23  Results from last 72 hours   Lab Units 04/16/25  3304  04/15/25  0743   BUN mg/dL 12 7   CREATININE mg/dL 2.68* 1.23       Plan:  - Monitor with daily RFP  - Maintain external urinary diversion device for strict I&O    FEN/GI:  #NPO  Assessment:  -  Will place NGT for nutrition  Plan:  - Monitor and replace electrolytes per protocol  - IVF: NS @ 75 mL/hr  - Diet: NPO   - Bowel Regimen: Docusate-Senna PRN    ENDOCRINE:  #pre-DM  Assessment:  Results from last 7 days   Lab Units 25  0510 04/15/25  0743 04/15/25  0540   POCT GLUCOSE mg/dL  --   --  200*   GLUCOSE mg/dL 90 125*  --       Plan:  - Accuchecks & ISS AC&HS     HEMATOLOGY:  #On heparin drip for Afib  Assessment:  - Baseline Hgb: 13.6  - Baseline Plts: 213  Results from last 7 days   Lab Units 25  0510 04/15/25  0054   HEMOGLOBIN g/dL 12.9* 13.6   HEMATOCRIT % 42.3 41.9   PLATELETS AUTO x10*3/uL 196 213     Plan:  - Continue to monitor with daily CBC and Coag panel    INFECTIOUS DISEASE:  #Left lower lobe pneumonia - likely 2/2 aspiration  Assessment:  Results from last 7 days   Lab Units 25  0510 04/15/25  0054   WBC AUTO x10*3/uL 7.8 5.7    - Temp (24hrs), Av.2 °C (98.9 °F), Min:36.9 °C (98.4 °F), Max:37.5 °C (99.5 °F)   ::Bcx x2  neg, MRSA neg  ::Resp culture: gram positive bacteria  - Low concern for CNS infection  Plan:  - Will discontinue ampicillin, acyclovir (zosyn and ceftriaxone dced)  - Continue IV unasyn for  LLE pneumonia with gram positive bacteria--> pending sensitivities - 7 day course ( 4/15 -)  - Continue to monitor for s/sx of infection  - Pan culture for temperature > 38.4 C    MUSCULOSKELETAL:  - No acute issues    SKIN:  - No acute issues  - Turns and skin care per NSU protocol    ACCESS:  - PIVs    PROPHYLAXIS:  - DVT Ppx: SCDs, heparin drip  - Gi ppx: None indicated    RESTRAINTS:  Not indicated/Patient does not meet criteria for restraints    Marie Love MD  Neuroscience Intensive Care          [1]   Past Medical History:  Diagnosis Date    Hypertension

## 2025-04-16 NOTE — PROGRESS NOTES
Physical Therapy                 Therapy Communication Note    Patient Name: Elvis Masters  MRN: [id number]  Department: PAR ICU  Room: 121/[facility]  Today's Date: 4/16/2025     Discipline: Physical Therapy    PT Missed Visit: Yes     Missed Visit Reason: Missed Visit Reason: Patient placed on medical hold    Missed Time: Attempt

## 2025-04-16 NOTE — PROGRESS NOTES
Elvis Masters is a 64 y.o. male on day 1 of admission presenting with Seizure (Multi).    Subjective   64 year old man with PMH of atrial Fibrillation (on Eliquis), HFpEF, epilepsy, HTN, DM type II, alcohol use disorder who presented to the ED post seizure at home, had repeat seizure while in the ED in the setting of alcohol withdrawal and left lower lobe pneumonia.  He was intubated due to severe agitation requiring sedation.   In ICU he remains intubated, with production of purulent sputum, yesterday he was following commands when sedation transiently paused.   Today the patient lost IV access transiently and while off propofol suffered breakthrough tonic clonic seizures that stopped after administration of lorazepam and restarting propofol.    The patient was evaluated by neurology NP who discussed with Dr. Thacker and recommends transfer to unit with continuous EEG.        Objective     Physical Exam  Vitals and nursing note reviewed.   Constitutional:       Appearance: He is ill-appearing and diaphoretic.      Comments: Somnolent, sedated, no abnormal movements, ventilated   HENT:      Head: Normocephalic and atraumatic.      Mouth/Throat:      Mouth: Mucous membranes are moist.      Pharynx: Oropharynx is clear.   Eyes:      Extraocular Movements: Extraocular movements intact.      Conjunctiva/sclera: Conjunctivae normal.      Pupils: Pupils are equal, round, and reactive to light.   Cardiovascular:      Rate and Rhythm: Regular rhythm. Tachycardia present.      Pulses: Normal pulses.   Pulmonary:      Effort: Pulmonary effort is normal.      Breath sounds: Normal breath sounds.   Abdominal:      General: Bowel sounds are normal.      Palpations: Abdomen is soft.   Musculoskeletal:      Cervical back: No rigidity.      Comments: In early AM patient moving 4 limbs, currently with flaccid limbs after restarting propofol   Skin:     Capillary Refill: Capillary refill takes less than 2 seconds.   Neurological:       "Mental Status: He is unresponsive.      Cranial Nerves: No facial asymmetry.      Motor: Seizure activity present.         Last Recorded Vitals  Blood pressure 105/65, pulse 108, temperature 37.5 °C (99.5 °F), resp. rate 20, height 1.803 m (5' 11\"), weight (!) 157 kg (345 lb 0.3 oz), SpO2 93%.  Intake/Output last 3 Shifts:  I/O last 3 completed shifts:  In: 2418.6 (15.5 mL/kg) [I.V.:870.2 (5.6 mL/kg); IV Piggyback:1548.3]  Out: 345 (2.2 mL/kg) [Urine:345 (0.1 mL/kg/hr)]  Weight: 156.5 kg     Relevant Results  Results for orders placed or performed during the hospital encounter of 04/15/25 (from the past 24 hours)   Troponin I, High Sensitivity   Result Value Ref Range    Troponin I, High Sensitivity 241 (HH) 0 - 20 ng/L   Heparin Assay   Result Value Ref Range    Heparin Unfractionated 0.6 See Comment Below for Therapeutic Ranges IU/mL   Heparin Assay   Result Value Ref Range    Heparin Unfractionated 0.6 See Comment Below for Therapeutic Ranges IU/mL   PST Top   Result Value Ref Range    Extra Tube Hold for add-ons.    Respiratory Culture/Smear    Specimen: Tracheal Aspirate; Fluid   Result Value Ref Range    Gram Stain (1+) Rare Polymorphonuclear leukocytes (A)     Gram Stain (3+) Moderate Mixed Gram positive bacteria (A)    Magnesium   Result Value Ref Range    Magnesium 2.41 (H) 1.60 - 2.40 mg/dL   CBC and Auto Differential   Result Value Ref Range    WBC 7.8 4.4 - 11.3 x10*3/uL    nRBC 0.3 (H) 0.0 - 0.0 /100 WBCs    RBC 4.40 (L) 4.50 - 5.90 x10*6/uL    Hemoglobin 12.9 (L) 13.5 - 17.5 g/dL    Hematocrit 42.3 41.0 - 52.0 %    MCV 96 80 - 100 fL    MCH 29.3 26.0 - 34.0 pg    MCHC 30.5 (L) 32.0 - 36.0 g/dL    RDW 15.7 (H) 11.5 - 14.5 %    Platelets 196 150 - 450 x10*3/uL    Neutrophils % 76.3 40.0 - 80.0 %    Immature Granulocytes %, Automated 0.8 0.0 - 0.9 %    Lymphocytes % 15.3 13.0 - 44.0 %    Monocytes % 6.4 2.0 - 10.0 %    Eosinophils % 0.8 0.0 - 6.0 %    Basophils % 0.4 0.0 - 2.0 %    Neutrophils Absolute " 5.94 1.20 - 7.70 x10*3/uL    Immature Granulocytes Absolute, Automated 0.06 0.00 - 0.70 x10*3/uL    Lymphocytes Absolute 1.19 (L) 1.20 - 4.80 x10*3/uL    Monocytes Absolute 0.50 0.10 - 1.00 x10*3/uL    Eosinophils Absolute 0.06 0.00 - 0.70 x10*3/uL    Basophils Absolute 0.03 0.00 - 0.10 x10*3/uL   Renal function panel   Result Value Ref Range    Glucose 90 74 - 99 mg/dL    Sodium 136 136 - 145 mmol/L    Potassium 3.4 (L) 3.5 - 5.3 mmol/L    Chloride 94 (L) 98 - 107 mmol/L    Bicarbonate 25 21 - 32 mmol/L    Anion Gap 20 10 - 20 mmol/L    Urea Nitrogen 12 6 - 23 mg/dL    Creatinine 2.68 (H) 0.50 - 1.30 mg/dL    eGFR 26 (L) >60 mL/min/1.73m*2    Calcium 8.6 8.6 - 10.3 mg/dL    Phosphorus 6.3 (H) 2.5 - 4.9 mg/dL    Albumin 3.8 3.4 - 5.0 g/dL   Heparin Assay, UFH   Result Value Ref Range    Heparin Unfractionated 0.4 See Comment Below for Therapeutic Ranges IU/mL   Electrocardiogram, 12-lead PRN ACS symptoms   Result Value Ref Range    Ventricular Rate 99 BPM    Atrial Rate 99 BPM    MN Interval 181 ms    QRS Duration 102 ms    QT Interval 351 ms    QTC Calculation(Bazett) 451 ms    P Axis 50 degrees    R Axis 75 degrees    T Axis 14 degrees    QRS Count 16 beats    Q Onset 254 ms    T Offset 430 ms    QTC Fredericia 414 ms   ECG 12 lead   Result Value Ref Range    Ventricular Rate 105 BPM    Atrial Rate 105 BPM    MN Interval 177 ms    QRS Duration 107 ms    QT Interval 384 ms    QTC Calculation(Bazett) 508 ms    P Axis 53 degrees    R Axis 75 degrees    T Axis 28 degrees    QRS Count 16 beats    Q Onset 253 ms    T Offset 445 ms    QTC Fredericia 462 ms     Current Medications[1]      Assessment & Plan  Seizure (Multi)    64 year old man with PMH of atrial Fibrillation (on Eliquis), HFpEF, epilepsy, HTN, DM type II, alcohol use disorder with acute hypoxemic respiratory failure, breakthrough seizures, left lower lobe pneumonia, alcohol withdrawal syndrome.     1) Acute hypoxemic respiratory failure secondary to  pneumonia (gram positives) and acute alcohol withdrawal syndrome  --Continue with mechanical ventilation with lung protective strategy. T  --Antibiotic regimen: Ceftriaxone, vancomycin, acyclovir,   --Cultures reviewed: Gram positives on sputum  --Spontaneous breathing trial not performed as the patient had seizures while off propofol    2) Alcohol withdrawal syndrome with delirium tremens  --Management with phenobabital and benzodiazepines  --Thiamine high dose for management of Wernicke encephalopathy    3) Epilepsy with breakthorugh seizures  --Management with benzodiazepines and propofol  --Broadened antimicrobial spectrum to cover meningoencephalitis (ceftriaxone, vancomycin, acyclovir and ampicillin)  --CT head ordered as patient was receiving IV heparin infusion     4) JARVIS   --Most likely hypovolemic, receiving IV hydration     5) Type 2 AMI vs NSTEMI  --No focal motion abnormalities on echocardiogram  --Anticoagulation with heparin infusion and aspirin    6) Atrial fibrillation   --Anticoagulation with heparin infusion    7) Prolonged Qtc  --Avoid medications that prolong QTc    DVT prophylaxis - Anticoagulated with heparin infusion   Nutrition: Tube feedings not started, unable to insert OG  Vascular catheters:  Peripheral lines  Urinary catheter needed for strict input/output        HTN (hypertension)  --Antihypertensives on hold  History of prediabetes  --Management with insulin   Depression, unspecified    Alcohol use disorder    Nicotine use    Atrial fibrillation, persistent (Multi)    Alcohol withdrawal delirium, acute, hyperactive (Multi)    KAYODE (obstructive sleep apnea)    HLD (hyperlipidemia)    Chronic lower back pain    Morbid obesity with BMI of 40.0-44.9, adult (Multi)      Jazmin Gunter MD  Pulmonary and Critical Care Medicine           [1]   Current Facility-Administered Medications   Medication Dose Route Frequency Provider Last Rate Last Admin    acyclovir (Zovirax) 750 mg in dextrose 5% 150  mL IV  10 mg/kg (Ideal) intravenous q12h Jazmin Lim MD        [Held by provider] aspirin chewable tablet 81 mg  81 mg orogastric tube Daily Jazmin Lim MD        aspirin suppository 300 mg  300 mg rectal Daily CONNIE Rivera   300 mg at 04/16/25 0811    cefTRIAXone (Rocephin) 2 g in dextrose (iso) IV 50 mL  2 g intravenous q12h Jazmin Lim MD        dexmedeTOMIDine (Precedex) 400 mcg in 100 mL (4 mcg/mL) sodium chloride 0.9% infusion  0.1-1.5 mcg/kg/hr intravenous Continuous CONNIE Rivera 7.85 mL/hr at 04/16/25 1243 0.2 mcg/kg/hr at 04/16/25 1243    doxycycline (Vibramycin) 100 mg in dextrose 5%  mL  100 mg intravenous q12h Jazmin Lim MD   Stopped at 04/16/25 1354    enoxaparin (Lovenox) syringe 40 mg  40 mg subcutaneous q12h Counts include [facility] beds at the [facility] CONNIE Rivera        fentanyl (Sublimaze) 10 mcg/mL in sodium chloride 0.9% infusion  0-200 mcg/hr intravenous Continuous Dayanna Naidu MD 5 mL/hr at 04/16/25 1300 50 mcg/hr at 04/16/25 1300    folic acid (Folvite) tablet 1 mg  1 mg orogastric tube Daily Shan Mcbride MD   1 mg at 04/16/25 0811    lactated Ringer's infusion 150 mL  150 mL intravenous Continuous Jazmin Lim MD   150 mL at 04/16/25 0959    levETIRAcetam (Keppra) 750 mg in dextrose 5% 100 mL IV  750 mg intravenous q12h Jazmin Lim MD   Stopped at 04/16/25 0641    LORazepam (Ativan) injection 0.5 mg  0.5 mg intravenous q2h PRN Jazmin Lim MD        Or    LORazepam (Ativan) injection 1 mg  1 mg intravenous q2h PRN Jazmin Lim MD   1 mg at 04/16/25 1303    Or    LORazepam (Ativan) injection 2 mg  2 mg intravenous q2h PRN Jazmin Lim MD   2 mg at 04/16/25 1353    multivitamin with minerals 1 tablet  1 tablet oral Daily Jazmin Lim MD   1 tablet at 04/16/25 1254    oxygen (O2) therapy   inhalation Continuous - Inhalation Dayanna Naidu MD        pantoprazole  (Protonix) injection 40 mg  40 mg intravenous Daily Shan Mcbride MD   40 mg at 04/16/25 0811    perflutren protein A microsphere (Optison) injection 0.5 mL  0.5 mL intravenous Once in imaging Shan Mcbride MD        PHENobarbital (Luminal) injection 97.5 mg  97.5 mg intravenous q8h Jazmin Lim MD   97.5 mg at 04/16/25 1204    Followed by    [START ON 4/18/2025] PHENobarbital (Luminal) injection 65 mg  65 mg intravenous q8h Jazmin Lim MD        Followed by    [START ON 4/20/2025] PHENobarbital (Luminal) injection 32.5 mg  32.5 mg intravenous q8h Jazmin Lim MD        PHENobarbital (Luminal) injection 65 mg  65 mg intravenous q6h PRN Shan Mcbride MD   65 mg at 04/15/25 1430    PHENobarbital (Luminal) injection 65 mg  65 mg intravenous q6h PRN Jazmin Lim MD        propofol (Diprivan) infusion  0-50 mcg/kg/min intravenous Continuous Dayanna Naidu MD 18.72 mL/hr at 04/16/25 1242 20 mcg/kg/min at 04/16/25 1242    rocuronium (ZeMuron) injection   intravenous Code/trauma/sedation med Charlie Fraser, DO   100 mg at 04/15/25 0547    sulfur hexafluoride microsphr (Lumason) injection 24.28 mg  2 mL intravenous Once in imaging Shan Mcbride MD        [START ON 4/19/2025] thiamine (Vitamin B-1) tablet 100 mg  100 mg oral Daily Jazmin Lim MD        thiamine (Vitamin B1) injection 500 mg  500 mg intravenous q8h [facility] Jazmin Lim MD        vancomycin (Vancocin) pharmacy to dose - pharmacy monitoring   miscellaneous Daily PRN Jazimn Lim MD

## 2025-04-16 NOTE — CARE PLAN
Problem: Safety - Medical Restraint  Goal: Remains free of injury from restraints (Restraint for Interference with Medical Device)  Outcome: Progressing  Flowsheets (Taken 4/16/2025 0643)  Remains free of injury from restraints (restraint for interference with medical device): Every 2 hours: Monitor safety, psychosocial status, comfort, nutrition and hydration  Goal: Free from restraint(s) (Restraint for Interference with Medical Device)  Outcome: Progressing  Flowsheets (Taken 4/16/2025 0643)  Free from restraint(s) (restraint for interference with medical device): ONCE/SHIFT or MINIMUM Every 12 hours: Assess and document the continuing need for restraints     Problem: Respiratory  Goal: Clear secretions with interventions this shift  Outcome: Progressing  Goal: Minimize anxiety/maximize coping throughout shift  Outcome: Progressing  Goal: Minimal/no exertional discomfort or dyspnea this shift  Outcome: Progressing  Goal: No signs of respiratory distress (eg. Use of accessory muscles. Peds grunting)  Outcome: Progressing  Goal: Patent airway maintained this shift  Outcome: Progressing  Goal: Tolerate mechanical ventilation evidenced by VS/agitation level this shift  Outcome: Progressing  Goal: Tolerate pulmonary toileting this shift  Outcome: Progressing  Goal: Verbalize decreased shortness of breath this shift  Outcome: Progressing  Goal: Wean oxygen to maintain O2 saturation per order/standard this shift  Outcome: Progressing  Goal: Increase self care and/or family involvement in next 24 hours  Outcome: Progressing     Problem: Pain - Adult  Goal: Verbalizes/displays adequate comfort level or baseline comfort level  Outcome: Progressing     Problem: Safety - Adult  Goal: Free from fall injury  Outcome: Progressing     Problem: Discharge Planning  Goal: Discharge to home or other facility with appropriate resources  Outcome: Progressing     Problem: Chronic Conditions and Co-morbidities  Goal: Patient's chronic  conditions and co-morbidity symptoms are monitored and maintained or improved  Outcome: Progressing     Problem: Nutrition  Goal: Nutrient intake appropriate for maintaining nutritional needs  Outcome: Progressing     Problem: Fall/Injury  Goal: Not fall by end of shift  Outcome: Progressing  Goal: Be free from injury by end of the shift  Outcome: Progressing  Goal: Verbalize understanding of personal risk factors for fall in the hospital  Outcome: Progressing  Goal: Verbalize understanding of risk factor reduction measures to prevent injury from fall in the home  Outcome: Progressing  Goal: Use assistive devices by end of the shift  Outcome: Progressing  Goal: Pace activities to prevent fatigue by end of the shift  Outcome: Progressing     Problem: Skin  Goal: Decreased wound size/increased tissue granulation at next dressing change  Outcome: Progressing  Flowsheets (Taken 4/16/2025 0643)  Decreased wound size/increased tissue granulation at next dressing change: Promote sleep for wound healing  Goal: Participates in plan/prevention/treatment measures  Outcome: Progressing  Flowsheets (Taken 4/16/2025 0643)  Participates in plan/prevention/treatment measures: Elevate heels  Goal: Prevent/manage excess moisture  Outcome: Progressing  Flowsheets (Taken 4/16/2025 0643)  Prevent/manage excess moisture: Cleanse incontinence/protect with barrier cream  Goal: Prevent/minimize sheer/friction injuries  Outcome: Progressing  Flowsheets (Taken 4/16/2025 0643)  Prevent/minimize sheer/friction injuries:   HOB 30 degrees or less   Turn/reposition every 2 hours/use positioning/transfer devices  Goal: Promote/optimize nutrition  Outcome: Progressing  Flowsheets (Taken 4/16/2025 0643)  Promote/optimize nutrition: Monitor/record intake including meals  Goal: Promote skin healing  Outcome: Progressing  Flowsheets (Taken 4/16/2025 0643)  Promote skin healing:   Assess skin/pad under line(s)/device(s)   Turn/reposition every 2  hours/use positioning/transfer devices     Problem: Knowledge Deficit  Goal: Patient/family/caregiver demonstrates understanding of disease process, treatment plan, medications, and discharge instructions  Outcome: Progressing     Problem: Mechanical Ventilation  Goal: Patient Will Maintain Patent Airway  Outcome: Progressing  Goal: Oral health is maintained or improved  Outcome: Progressing  Goal: Tracheostomy will be managed safely  Outcome: Progressing  Goal: ET tube will be managed safely  Outcome: Progressing  Goal: Ability to express needs and understand communication  Outcome: Progressing  Goal: Mobility/activity is maintained at optimum level for patient  Outcome: Progressing

## 2025-04-16 NOTE — PROGRESS NOTES
Occupational Therapy                 Therapy Communication Note    Patient Name: Elvis Masters  MRN: [id number]  Department: PAR ICU  Room: 121/[facility]  Today's Date: 4/16/2025     Discipline: Occupational Therapy    OT Missed Visit: Yes     Missed Visit Reason: Missed Visit Reason:  (discussed with RN, likely attempting awakening trials today to determine pt's neuro status)    Missed Time: Attempt    Comment:

## 2025-04-16 NOTE — PROGRESS NOTES
[facility] Pulmonary and Critical Care Medicine   Progress Note        Printed on 4/16/2025    Patient is a 64 y.o. male admitted on 4/15/2025 12:27 AM with chief complaint of Seizure    Subjective     History of Present Illness:    63 y/o male with PMHX of DISORDER with withdrawals, seizures (on Keppra), HTN, pre-- DM, persistent A-fib (on Eliquis), KAYODE (noncompliant W/CPAP), COLIN, and depression presents to the ED today from home where her mother witnessed seizure activity that lasted approximately 10 minutes.  Patient intubated for worsening agitation and restlessness despite sedation efforts 50mg IV Benadryl, 5mg Haldol, and 2mg x3 doses of Ativan.  More seizure-like activity observed after transfer to ICU, phenobarbital taper initiated      Interval ICU events:   4/15: Sedation held for neuroexam to rule out need for continuous EEG monitoring, patient agitated and following commands to move legs-sedation restarted for increased agitation    4/16: Patient remains intubated on sedation. Hemodynamically stable with Sp02 93% on 50% FiO2. UOP decreased overnight: Improved with LR bolus. Creatinine 2.68 with elevated Phos 6.3 and Mg 2.41. IV fluids ordered. EEG planned for 0830 then plan for SAT for possible SBT.     Addendum: Attempted to transition patient over to Precedex from Propofol for possible SBT: Patient opening eyes but not following any direction with increasing agitation. While propofol being titrated up, patient lost IV access and while off propofol a breakthrough tonic clonic seizure occurred that abated with lorazepam administration and patent IV access. Neuro APRN present and discussed plan of care with attending to recommends transfer to [facility] ICU for continuous EEG. See attending intensivists' note for further assessment and plan.    Scheduled Medications:   Scheduled Medications[1]     Continuous Medications:   Continuous Medications[2]     PRN Medications:   PRN  Medications[3]      Objective   Vitals:  Most Recent:  Vitals:    04/16/25 0744   BP:    Pulse: 108   Resp: 20   Temp:    SpO2: 93%       24hr Min/Max:  Temp  Min: 36.8 °C (98.2 °F)  Max: 37.7 °C (99.9 °F)  Pulse  Min: 84  Max: 110  BP  Min: 82/58  Max: 165/91  Resp  Min: 14  Max: 45  SpO2  Min: 92 %  Max: 100 %    LDA:   ETT  7.5 mm (Active)   Placement Date/Time: 04/15/25 0420   Hand Hygiene Completed: Yes  Technique: Video laryngoscopy  ETT Type: ETT - single  Single Lumen Tube Size: 7.5 mm  Cuffed: Yes  Location: Oral  Airway Insertion Attempts: 1  Comments: 24 at lip  Placed By: ICU phy...   Number of days: 1       Urethral Catheter 16 Fr. (Active)   Placement Date/Time: 04/15/25 0644   Hand Hygiene Completed: Yes  Tube Size (Fr.): 16 Fr.  Urine Returned: Yes   Number of days: 1         Vent settings:  Vent Mode: Volume control/assist control  FiO2 (%):  [50 %-60 %] 50 %  S RR:  [20] 20  S VT:  [500 mL] 500 mL  PEEP/CPAP (cm H2O):  [8 cm H20] 8 cm H20  MAP (cm H2O):  [14-17] 15    Hemodynamic parameters for last 24 hours:         Intake/Output Summary (Last 24 hours) at 4/16/2025 0836  Last data filed at 4/16/2025 0641  Gross per 24 hour   Intake 2000.98 ml   Output 285 ml   Net 1715.98 ml       Review of Systems:  14 point review of systems was completed and negative except for those specially mention in my HPI    Physical exam:    Physical Exam  Constitutional:       General: He is not in acute distress.     Appearance: He is obese.      Interventions: He is sedated, intubated and restrained.   HENT:      Head: Normocephalic and atraumatic.      Mouth/Throat:      Mouth: Mucous membranes are moist.      Pharynx: Oropharynx is clear.   Eyes:      Extraocular Movements: Extraocular movements intact.      Pupils: Pupils are equal, round, and reactive to light.   Cardiovascular:      Rate and Rhythm: Regular rhythm. Tachycardia present.      Pulses: Normal pulses.      Heart sounds: Normal heart sounds.    Pulmonary:      Effort: Pulmonary effort is normal. He is intubated.      Breath sounds: Examination of the right-middle field reveals decreased breath sounds. Examination of the left-middle field reveals decreased breath sounds. Examination of the right-lower field reveals decreased breath sounds. Examination of the left-lower field reveals decreased breath sounds. Decreased breath sounds present.   Abdominal:      General: Abdomen is protuberant. Bowel sounds are decreased.      Palpations: Abdomen is soft.   Musculoskeletal:      Cervical back: Normal range of motion and neck supple.   Skin:     General: Skin is warm and dry.      Capillary Refill: Capillary refill takes less than 2 seconds.   Neurological:      Mental Status: He is disoriented.      GCS: GCS eye subscore is 3. GCS verbal subscore is 1. GCS motor subscore is 5.      Cranial Nerves: No facial asymmetry.        Lab/Radiology/Diagnostic Review:    All labs and Imaging have been personally reviewed.       Neuro:  #Acute metabolic encephalopathy  #Seizure disorder (EtOH withdrawal vs poor Keppra compliance)  #Acute alcohol withdrawal  #Breakthrough seizure  - Baseline neuro function: A&O x 3, A&O x2 on arrival  - CT Head on admit: No acute issues  - Sedation: Propofol --> transition to Precedex after EEG  - Analgesia: Fentanyl  - Phenobarb taper  - Continue alcohol withdrawal orders and provide scheduled vitamin B1, folic acid, and multivitamin  - Maintain sleep hygiene, monitor ICU delirium  - Neurology consulted for EEG d/t seizures/encephalopathy  -->Recommends continuing Keppra  - Neurochecks, CAM assessment, delirium precautions  - Seizure precautions  - EEG today - Recheck neuro exam on Precedex per Neuro recomendation  - SAT/SBT    Cardiovascular  #Afib status post PVI  #HFpEF  #ST changes with elevated troponin - likely demand ischemia  -ECG: Sinus tachycardia-->IV metoprolol, p.o. when able  -Stress test (10/04/21) LVEF 45% with global LV  systolic dysfunction  -TTE (4/16) LVEF 50-55%, impaired LV diastolic filling  -Home Eliquis on hold while intubated: VTE ppx Heparin gtt  -Asprin ordered  -Troponins downtrending  -Cardiology following  -Optimize electrolytes: Maintain K greater than 4 and Mg greater than 2  -VTE ppx: Heparin gtt  -Maintain MAPs>65    Pulmonary:  #Acute respiratory failure requiring mechanical ventilation  ##c/f pneumonia (CAP/aspiration)  -Intubated to support his airway and ventilatory mechanics, and for his general protection  -ABG in ED demonstrating a respiratory acidosis (7.23/65/94/ 7.6) and chronic Co2 retention  -Vent settings: AC/20/500/8  - CXR: LL congestion, atelectasis/infiltrate improvement  - New purulent sputum noted with gram positive organism growth  - ABX per ID below    GI:  -NPO  -Start enteral feeding if unable to extubate    Renal:   #Metabolic lactic acidosis  #JARVIS - likely pre-renal   #Hyperphosphatemia & Hypermagnesemia  - 500 mL LR bolus given  - LR @150 mL/hr  - Recheck BMP today  - Trend lactate  - U tox: Positive THC  - Strict intake and output monitoring  - Daily BMP and electrolyte repletion    Endocrine:  #Pre DM  - Hgb A1c (10/21/2024) 5.7  - BG POCT q 6 hr     Heme/Onc:  #Macrocytic anemia  - Thiamine, folate, multivitamin   - Daily CBC, transfuse for Hgb goal >  7 or symptomatic anemia    ID:  #c/f pneumonia (CAP/aspiration)  - Resp culture growing mixed gram positive  -Broadened CAP coverage with Ceftriaxone, ampicillin, vanc  -- Culture Data: Blood, urine, MRSA/staph pending    Skin/MSK:  -No acute issues    ICU CHECK LIST:   Antimicrobials: Ceftriaxone, vanc, ampicillin  Oxygen: Vent  Drips:   Feeding/Fluids: LR  Analgesia: Fentanyl  Sedation: Propofol -->Precedex  Thromboprophylaxis: SCDs and heparin gtt  Ulcer prophylaxis: PPI  Glycemic control: BGM with SSI as needed  Bowel care: PRN   Indwelling catheters: Estevez  Lines: PIVs   Restraints: none  Dispo: MICU   Code Status: Full        I have  personally spent 40 minutes of critical care time, exclusive of time spent on any procedures, in evaluation and management of this critically ill patient’s condition mentioned above in the assessment and plan.     PORTER Rivera-CNP  Pulmonary and Critical Care Medicine    Please excuse any typographical or unwanted errors within this documentation as voice recognition software was used to dictate this note.            [1] [Held by provider] aspirin, 81 mg, orogastric tube, Daily  aspirin, 300 mg, rectal, Daily  cefTRIAXone, 2 g, intravenous, q24h  doxycycline, 100 mg, intravenous, q12h  folic acid, 1 mg, orogastric tube, Daily  levETIRAcetam, 750 mg, intravenous, q12h  multivitamin with iron-minerals, 15 mL, orogastric tube, Daily  oxygen, , inhalation, Continuous - Inhalation  pantoprazole, 40 mg, intravenous, Daily  perflutren protein A microsphere, 0.5 mL, intravenous, Once in imaging  potassium chloride, 40 mEq, intravenous, Once  sulfur hexafluoride microsphr, 2 mL, intravenous, Once in imaging  thiamine, 500 mg, intravenous, q8h AURY     [2] fentaNYL, 0-200 mcg/hr, Last Rate: 50 mcg/hr (04/15/25 1941)  heparin, 0-4,000 Units/hr, Last Rate: 800 Units/hr (04/16/25 0641)  propofol, 0-50 mcg/kg/min, Last Rate: 30 mcg/kg/min (04/16/25 0810)     [3] PRN medications: heparin, PHENobarbital, rocuronium

## 2025-04-16 NOTE — CARE PLAN
The patient's goals for the shift include      The clinical goals for the shift include    Over the shift, the patient did not make progress toward the following goals. Barriers to progression include ***. Recommendations to address these barriers include ***.

## 2025-04-16 NOTE — CONSULTS
"Nutrition Initial Assessment:   Nutrition Assessment    Reason for Assessment: Admission nursing screening    Patient is a 64 y.o. male presenting with seizures      Nutrition History:  Food and Nutrient History: Pt remains intubated and not stable enough to feed at this time.  [place] [facility]  He is getting 617 rodolfo from his propofol       Anthropometrics:  Height: 187.9 cm (6' 1.98\")   Weight: (!) 157 kg (345 lb 0.3 oz)   BMI (Calculated): 48.14  IBW/kg (Dietitian Calculated): 86 kg  Percent of IBW: 178 %                      Weight History:   Wt Readings from Last 10 Encounters:   04/16/25 (!) 157 kg (345 lb 0.3 oz)   08/24/24 (!) 154 kg (340 lb)         Weight Change %:  Weight History / % Weight Change: Records indicate pt was 338 lbs in August of 2024.  Pt was recorded at 5'11\" this admit.  He was recorded at 6'3\" during an admit [place] [facility]  His 's liscense said that he is 6'2\" which is the ht I am using for calculations  Significant Weight Loss: No    Nutrition Focused Physical Exam Findings:    Subcutaneous Fat Loss:   Defer Subcutaneous Fat Loss Assessment: Defer all  Defer All Reason: not a good time  Muscle Wasting:  Defer Muscle Wasting Assessment: Defer all  Defer All Reason: not a good time  Edema:  Edema Location: some edema  Physical Findings:       Nutrition Significant Labs:  BMP Trend:   Results from last 7 days   Lab Units 04/16/25  0510 04/15/25  0743 04/15/25  0054   GLUCOSE mg/dL 90 125* 139*   CALCIUM mg/dL 8.6 8.5* 8.5*   SODIUM mmol/L 136 135* 134*   POTASSIUM mmol/L 3.4* 3.6 3.1*   CO2 mmol/L 25 26 27   CHLORIDE mmol/L 94* 95* 94*   BUN mg/dL 12 7 5*   CREATININE mg/dL 2.68* 1.23 0.60        Nutrition Specific Medications:  Lovenox; folvite; multi vit; min; B-1;     I/O:    ;      Dietary Orders (From admission, onward)       Start     Ordered    04/15/25 0741  May Participate in Room Service With Assistance  ( ROOM SERVICE MAY PARTICIPATE WITH ASSISTANCE)  Once      "   Question:  .  Answer:  Yes    04/15/25 0740    04/15/25 0514  NPO Diet; Effective now  Diet effective now         04/15/25 0514                     Estimated Needs:      Method for Estimating Needs: 1754-4732  26-30 rodolfo kg of IBW     Method for Estimating 24 Hour Protein Needs:    1.1-1.4 gm kg of IBW as medically indicated     Method for Estimating 24 Hour Fluid Needs: 5357-9911   20-30 ml kg of IBW as medically indicated  Patient on Order Fluid Restriction: No        Nutrition Diagnosis   Malnutrition Diagnosis  Patient has Malnutrition Diagnosis: No    Nutrition Diagnosis  Patient has Nutrition Diagnosis: Yes  Diagnosis Status (1): New  Nutrition Diagnosis 1: Inadequate oral intake  Related to (1): decreased ability to consume sufficient energy  As Evidenced by (1): pt is intubated and remains NPO at this time due to his unstable status       Nutrition Interventions/Recommendations   Nutrition prescription for enteral nutrition    Nutrition Recommendations:  Individualized Nutrition Prescription Provided for : If pt stabilizes and a TF is appropriate consider TF of Vital 1.5,  start at 10 ml hr and increase, as tolerated, 10 ml every 6 hours to goal rate of 39 ml hr 24.  This will provide 1404 rodolfo and 63 gm pro with 715 ml fluid toward fluid needs.  Add 1 packet of prostat  3 times per day for a total of 1704 rodolfo and 108 gm pro per day.  Water flushes to be determined when TF is initiated.  With propofol, pt will get a total of 2321 rodolfo per day.  Monitor labs and toleration of feeding,  monitor for any needed changes in water flushes,  Monitor for any changes in propofol    Nutrition Interventions/Goals:   Enteral Intake: Management of composition of enteral nutrition, Management of delivery rate of enteral nutrition, Management of flushing of feeding tube  Goal: toleration of TF at goal rate and discontinued propofol  Coordination of Care with Providers: Nursing, Provider      Education Documentation  No  documentation found.            Nutrition Monitoring and Evaluation   Food/Nutrient Related History Monitoring  Monitoring and Evaluation Plan: Enteral and parenteral nutrition intake determination  Enteral and Parenteral Nutrition Intake Determination: Enteral nutrition intake - Tolerate TF at goal rate, Enteral nutrition intake - Prevent refeeding, Enteral nutrition intake - To meet > 75% estimated energy needs, Enteral nutrition intake - Other  Criteria: toleration of TF at goal rate    Anthropometric Measurements  Monitoring and Evaluation Plan: Body weight    Biochemical Data, Medical Tests and Procedures  Monitoring and Evaluation Plan: Electrolyte/renal panel, Glucose/endocrine profile  Criteria: improved BMP  Criteria: glucose within desired range              Time Spent (min): 45 minutes

## 2025-04-16 NOTE — PROGRESS NOTES
04/16/25 1422   Discharge Planning   Living Arrangements Parent   Support Systems Parent;Family members;Children  (3 adult children, patient only makes occasional contact)   Assistance Needed iadls when sober, ambulates without assistive devices.  Family states is able to drive, but they do not let him   Type of Residence Private residence   Do you have animals or pets at home? Yes   Type of Animals or Pets c   Home or Post Acute Services Other (Comment)  (TBD, pending hospital course. Referral to [facility] for [facility]H rehab when able to participate)   Expected Discharge Disposition Othe  (TBD, pending hospital course.  Referral to [facility] for [facility] rehab when able to participate)   Does the patient need discharge transport arranged?   (pending disposition)   Financial Resource Strain   How hard is it for you to pay for the very basics like food, housing, medical care, and heating? Very Hard  (does not work, receives SS at age 64)   Housing Stability   In the last 12 months, was there a time when you were not able to pay the mortgage or rent on time? N  (lives with parents)   In the past 12 months, how many times have you moved where you were living? 0   At any time in the past 12 months, were you homeless or living in a shelter (including now)? N   Transportation Needs   In the past 12 months, has lack of transportation kept you from medical appointments or from getting medications? no   In the past 12 months, has lack of transportation kept you from meetings, work, or from getting things needed for daily living? No     Record reviewed.  BIBA from home d/t seizure.  Admitted.  Required intubation in ED.  PMH includes seizures with ETOH use, HTN, pre-DM, Afib (Eliquis)KAYODE (non-compliant w/CPAP).  Remains intubated and sedated.  This TCC met with parents and uncle in family lounge.  Introduced self, explained role.  Demographic information and insurance verified.  Patient is from home with parents. Independent with adls  when sober.  Patents do not let him drive, but he has a license.  Uses bicycle in the community.  Mom stated he has been in and out of in-patient rehab for ETOH many times, gets sober and starts drinking again when home.  Parents stated they just had conversation with him about returning to in-patient rehab, prior to this admission.  Parents stated that they do not believe patient has Advanced Directives in place.  Parents updated that priority identifies his adult children as NOK in absence of Adv Dir., and encouraged they speak with patient regarding putting something in place when able.  Parents stated they do have contact with children.  TCC made referral to [facility] for possible in-patient ETOH rehab at discharge.  PT/OT evals pending medical readiness.  Dispo and transportation needs pending hospital course.  Patient's record is flagged for insurance being out of network.  SW updated via secure message.  Care Transitions will continue to follow.

## 2025-04-17 ENCOUNTER — APPOINTMENT (OUTPATIENT)
Dept: CARDIOLOGY | Facility: HOSPITAL | Age: 65
DRG: 896 | End: 2025-04-17
Payer: COMMERCIAL

## 2025-04-17 ENCOUNTER — APPOINTMENT (OUTPATIENT)
Dept: RADIOLOGY | Facility: HOSPITAL | Age: 65
DRG: 896 | End: 2025-04-17
Payer: COMMERCIAL

## 2025-04-17 ENCOUNTER — APPOINTMENT (OUTPATIENT)
Dept: RADIOLOGY | Facility: HOSPITAL | Age: 65
End: 2025-04-17
Payer: COMMERCIAL

## 2025-04-17 ENCOUNTER — APPOINTMENT (OUTPATIENT)
Dept: NEUROLOGY | Facility: HOSPITAL | Age: 65
DRG: 896 | End: 2025-04-17
Payer: COMMERCIAL

## 2025-04-17 ENCOUNTER — HOSPITAL ENCOUNTER (INPATIENT)
Facility: HOSPITAL | Age: 65
LOS: 3 days | Discharge: OTHER INSTITUTION | DRG: 896 | End: 2025-04-20
Attending: STUDENT IN AN ORGANIZED HEALTH CARE EDUCATION/TRAINING PROGRAM | Admitting: STUDENT IN AN ORGANIZED HEALTH CARE EDUCATION/TRAINING PROGRAM
Payer: COMMERCIAL

## 2025-04-17 VITALS
DIASTOLIC BLOOD PRESSURE: 75 MMHG | WEIGHT: 315 LBS | OXYGEN SATURATION: 96 % | RESPIRATION RATE: 20 BRPM | HEIGHT: 74 IN | HEART RATE: 55 BPM | SYSTOLIC BLOOD PRESSURE: 115 MMHG | TEMPERATURE: 97 F | BODY MASS INDEX: 40.43 KG/M2

## 2025-04-17 DIAGNOSIS — K59.00 CONSTIPATION, UNSPECIFIED CONSTIPATION TYPE: ICD-10-CM

## 2025-04-17 DIAGNOSIS — F41.1 GENERALIZED ANXIETY DISORDER: Chronic | ICD-10-CM

## 2025-04-17 DIAGNOSIS — F10.90 ALCOHOL USE DISORDER: ICD-10-CM

## 2025-04-17 DIAGNOSIS — R56.9 SEIZURE (MULTI): Primary | ICD-10-CM

## 2025-04-17 DIAGNOSIS — I48.19 ATRIAL FIBRILLATION, PERSISTENT (MULTI): ICD-10-CM

## 2025-04-17 DIAGNOSIS — J18.9 PNEUMONIA DUE TO INFECTIOUS ORGANISM, UNSPECIFIED LATERALITY, UNSPECIFIED PART OF LUNG: ICD-10-CM

## 2025-04-17 LAB
ALBUMIN SERPL BCP-MCNC: 3.2 G/DL (ref 3.4–5)
ALBUMIN SERPL BCP-MCNC: 3.2 G/DL (ref 3.4–5)
ANION GAP BLDA CALCULATED.4IONS-SCNC: 8 MMO/L (ref 10–25)
ANION GAP SERPL CALC-SCNC: 12 MMOL/L (ref 10–20)
ANION GAP SERPL CALC-SCNC: 13 MMOL/L (ref 10–20)
APTT PPP: 29 SECONDS (ref 26–36)
APTT PPP: 29 SECONDS (ref 26–36)
ATRIAL RATE: 105 BPM
BASE EXCESS BLDA CALC-SCNC: 5.6 MMOL/L (ref -2–3)
BASOPHILS # BLD AUTO: 0.01 X10*3/UL (ref 0–0.1)
BASOPHILS # BLD AUTO: 0.02 X10*3/UL (ref 0–0.1)
BASOPHILS NFR BLD AUTO: 0.2 %
BASOPHILS NFR BLD AUTO: 0.5 %
BODY TEMPERATURE: ABNORMAL
BUN SERPL-MCNC: 12 MG/DL (ref 6–23)
BUN SERPL-MCNC: 12 MG/DL (ref 6–23)
CA-I BLD-SCNC: 0.99 MMOL/L (ref 1.1–1.33)
CA-I BLDA-SCNC: 1.12 MMOL/L (ref 1.1–1.33)
CALCIUM SERPL-MCNC: 7.9 MG/DL (ref 8.6–10.3)
CALCIUM SERPL-MCNC: 8.1 MG/DL (ref 8.6–10.6)
CARDIAC TROPONIN I PNL SERPL HS: 50 NG/L (ref 0–53)
CARDIAC TROPONIN I PNL SERPL HS: 61 NG/L (ref 0–53)
CHLORIDE BLDA-SCNC: 100 MMOL/L (ref 98–107)
CHLORIDE SERPL-SCNC: 97 MMOL/L (ref 98–107)
CHLORIDE SERPL-SCNC: 98 MMOL/L (ref 98–107)
CO2 SERPL-SCNC: 28 MMOL/L (ref 21–32)
CO2 SERPL-SCNC: 29 MMOL/L (ref 21–32)
CREAT SERPL-MCNC: 1.33 MG/DL (ref 0.5–1.3)
CREAT SERPL-MCNC: 1.48 MG/DL (ref 0.5–1.3)
EGFRCR SERPLBLD CKD-EPI 2021: 53 ML/MIN/1.73M*2
EGFRCR SERPLBLD CKD-EPI 2021: 60 ML/MIN/1.73M*2
EOSINOPHIL # BLD AUTO: 0.11 X10*3/UL (ref 0–0.7)
EOSINOPHIL # BLD AUTO: 0.16 X10*3/UL (ref 0–0.7)
EOSINOPHIL NFR BLD AUTO: 2.8 %
EOSINOPHIL NFR BLD AUTO: 3.7 %
ERYTHROCYTE [DISTWIDTH] IN BLOOD BY AUTOMATED COUNT: 15 % (ref 11.5–14.5)
ERYTHROCYTE [DISTWIDTH] IN BLOOD BY AUTOMATED COUNT: 15 % (ref 11.5–14.5)
ERYTHROCYTE [DISTWIDTH] IN BLOOD BY AUTOMATED COUNT: 15.1 % (ref 11.5–14.5)
GLUCOSE BLD MANUAL STRIP-MCNC: 103 MG/DL (ref 74–99)
GLUCOSE BLD MANUAL STRIP-MCNC: 112 MG/DL (ref 74–99)
GLUCOSE BLD MANUAL STRIP-MCNC: 70 MG/DL (ref 74–99)
GLUCOSE BLD MANUAL STRIP-MCNC: 83 MG/DL (ref 74–99)
GLUCOSE BLD MANUAL STRIP-MCNC: 89 MG/DL (ref 74–99)
GLUCOSE BLDA-MCNC: 99 MG/DL (ref 74–99)
GLUCOSE SERPL-MCNC: 137 MG/DL (ref 74–99)
GLUCOSE SERPL-MCNC: 91 MG/DL (ref 74–99)
HCO3 BLDA-SCNC: 29.8 MMOL/L (ref 22–26)
HCT VFR BLD AUTO: 32.8 % (ref 41–52)
HCT VFR BLD AUTO: 33 % (ref 41–52)
HCT VFR BLD AUTO: 33.1 % (ref 41–52)
HCT VFR BLD EST: 34 % (ref 41–52)
HGB BLD-MCNC: 10.6 G/DL (ref 13.5–17.5)
HGB BLD-MCNC: 10.6 G/DL (ref 13.5–17.5)
HGB BLD-MCNC: 10.7 G/DL (ref 13.5–17.5)
HGB BLDA-MCNC: 11.3 G/DL (ref 13.5–17.5)
IMM GRANULOCYTES # BLD AUTO: 0.01 X10*3/UL (ref 0–0.7)
IMM GRANULOCYTES # BLD AUTO: 0.02 X10*3/UL (ref 0–0.7)
IMM GRANULOCYTES NFR BLD AUTO: 0.3 % (ref 0–0.9)
IMM GRANULOCYTES NFR BLD AUTO: 0.5 % (ref 0–0.9)
INHALED O2 CONCENTRATION: 50 %
INR PPP: 1.1 (ref 0.9–1.1)
INR PPP: 1.1 (ref 0.9–1.1)
LACTATE BLDA-SCNC: 0.6 MMOL/L (ref 0.4–2)
LYMPHOCYTES # BLD AUTO: 0.73 X10*3/UL (ref 1.2–4.8)
LYMPHOCYTES # BLD AUTO: 0.87 X10*3/UL (ref 1.2–4.8)
LYMPHOCYTES NFR BLD AUTO: 18.4 %
LYMPHOCYTES NFR BLD AUTO: 20.2 %
MAGNESIUM SERPL-MCNC: 2.05 MG/DL (ref 1.6–2.4)
MAGNESIUM SERPL-MCNC: 2.27 MG/DL (ref 1.6–2.4)
MCH RBC QN AUTO: 29.8 PG (ref 26–34)
MCH RBC QN AUTO: 30.2 PG (ref 26–34)
MCH RBC QN AUTO: 30.3 PG (ref 26–34)
MCHC RBC AUTO-ENTMCNC: 32.1 G/DL (ref 32–36)
MCHC RBC AUTO-ENTMCNC: 32.3 G/DL (ref 32–36)
MCHC RBC AUTO-ENTMCNC: 32.3 G/DL (ref 32–36)
MCV RBC AUTO: 93 FL (ref 80–100)
MCV RBC AUTO: 94 FL (ref 80–100)
MCV RBC AUTO: 94 FL (ref 80–100)
MONOCYTES # BLD AUTO: 0.23 X10*3/UL (ref 0.1–1)
MONOCYTES # BLD AUTO: 0.29 X10*3/UL (ref 0.1–1)
MONOCYTES NFR BLD AUTO: 5.8 %
MONOCYTES NFR BLD AUTO: 6.7 %
NEUTROPHILS # BLD AUTO: 2.87 X10*3/UL (ref 1.2–7.7)
NEUTROPHILS # BLD AUTO: 2.96 X10*3/UL (ref 1.2–7.7)
NEUTROPHILS NFR BLD AUTO: 68.7 %
NEUTROPHILS NFR BLD AUTO: 72.2 %
NRBC BLD-RTO: 0 /100 WBCS (ref 0–0)
OXYHGB MFR BLDA: 95.6 % (ref 94–98)
P AXIS: 53 DEGREES
PCO2 BLDA: 41 MM HG (ref 38–42)
PH BLDA: 7.47 PH (ref 7.38–7.42)
PHOSPHATE SERPL-MCNC: 3.3 MG/DL (ref 2.5–4.9)
PHOSPHATE SERPL-MCNC: 3.4 MG/DL (ref 2.5–4.9)
PLATELET # BLD AUTO: 117 X10*3/UL (ref 150–450)
PLATELET # BLD AUTO: 128 X10*3/UL (ref 150–450)
PLATELET # BLD AUTO: 130 X10*3/UL (ref 150–450)
PO2 BLDA: 76 MM HG (ref 85–95)
POTASSIUM BLDA-SCNC: 3.3 MMOL/L (ref 3.5–5.3)
POTASSIUM SERPL-SCNC: 3.3 MMOL/L (ref 3.5–5.3)
POTASSIUM SERPL-SCNC: 3.9 MMOL/L (ref 3.5–5.3)
PR INTERVAL: 177 MS
PROTHROMBIN TIME: 12.3 SECONDS (ref 9.8–12.4)
PROTHROMBIN TIME: 12.5 SECONDS (ref 9.8–12.4)
Q ONSET: 253 MS
QRS COUNT: 16 BEATS
QRS DURATION: 107 MS
QT INTERVAL: 384 MS
QTC CALCULATION(BAZETT): 508 MS
QTC FREDERICIA: 462 MS
R AXIS: 75 DEGREES
RBC # BLD AUTO: 3.5 X10*6/UL (ref 4.5–5.9)
RBC # BLD AUTO: 3.54 X10*6/UL (ref 4.5–5.9)
RBC # BLD AUTO: 3.56 X10*6/UL (ref 4.5–5.9)
SAO2 % BLDA: 98 % (ref 94–100)
SODIUM BLDA-SCNC: 134 MMOL/L (ref 136–145)
SODIUM SERPL-SCNC: 134 MMOL/L (ref 136–145)
SODIUM SERPL-SCNC: 136 MMOL/L (ref 136–145)
T AXIS: 28 DEGREES
T OFFSET: 445 MS
UFH PPP CHRO-ACNC: 0.2 IU/ML (ref ?–1.1)
UFH PPP CHRO-ACNC: 0.2 IU/ML (ref ?–1.1)
VANCOMYCIN TROUGH SERPL-MCNC: 11.3 UG/ML (ref 5–20)
VENTRICULAR RATE: 105 BPM
WBC # BLD AUTO: 4 X10*3/UL (ref 4.4–11.3)
WBC # BLD AUTO: 4.3 X10*3/UL (ref 4.4–11.3)
WBC # BLD AUTO: 5.5 X10*3/UL (ref 4.4–11.3)

## 2025-04-17 PROCEDURE — 94640 AIRWAY INHALATION TREATMENT: CPT

## 2025-04-17 PROCEDURE — 82947 ASSAY GLUCOSE BLOOD QUANT: CPT

## 2025-04-17 PROCEDURE — 95720 EEG PHY/QHP EA INCR W/VEEG: CPT | Performed by: PSYCHIATRY & NEUROLOGY

## 2025-04-17 PROCEDURE — 2500000004 HC RX 250 GENERAL PHARMACY W/ HCPCS (ALT 636 FOR OP/ED): Mod: JZ

## 2025-04-17 PROCEDURE — 85610 PROTHROMBIN TIME: CPT | Performed by: STUDENT IN AN ORGANIZED HEALTH CARE EDUCATION/TRAINING PROGRAM

## 2025-04-17 PROCEDURE — 36415 COLL VENOUS BLD VENIPUNCTURE: CPT

## 2025-04-17 PROCEDURE — 83735 ASSAY OF MAGNESIUM: CPT | Performed by: STUDENT IN AN ORGANIZED HEALTH CARE EDUCATION/TRAINING PROGRAM

## 2025-04-17 PROCEDURE — 94002 VENT MGMT INPAT INIT DAY: CPT

## 2025-04-17 PROCEDURE — 2500000005 HC RX 250 GENERAL PHARMACY W/O HCPCS: Performed by: STUDENT IN AN ORGANIZED HEALTH CARE EDUCATION/TRAINING PROGRAM

## 2025-04-17 PROCEDURE — 93010 ELECTROCARDIOGRAM REPORT: CPT | Performed by: INTERNAL MEDICINE

## 2025-04-17 PROCEDURE — 82435 ASSAY OF BLOOD CHLORIDE: CPT

## 2025-04-17 PROCEDURE — 2500000001 HC RX 250 WO HCPCS SELF ADMINISTERED DRUGS (ALT 637 FOR MEDICARE OP)

## 2025-04-17 PROCEDURE — 2020000001 HC ICU ROOM DAILY

## 2025-04-17 PROCEDURE — 71045 X-RAY EXAM CHEST 1 VIEW: CPT | Performed by: RADIOLOGY

## 2025-04-17 PROCEDURE — 2500000004 HC RX 250 GENERAL PHARMACY W/ HCPCS (ALT 636 FOR OP/ED)

## 2025-04-17 PROCEDURE — 99223 1ST HOSP IP/OBS HIGH 75: CPT

## 2025-04-17 PROCEDURE — 2500000002 HC RX 250 W HCPCS SELF ADMINISTERED DRUGS (ALT 637 FOR MEDICARE OP, ALT 636 FOR OP/ED)

## 2025-04-17 PROCEDURE — 5A1935Z RESPIRATORY VENTILATION, LESS THAN 24 CONSECUTIVE HOURS: ICD-10-PCS | Performed by: STUDENT IN AN ORGANIZED HEALTH CARE EDUCATION/TRAINING PROGRAM

## 2025-04-17 PROCEDURE — 82435 ASSAY OF BLOOD CHLORIDE: CPT | Performed by: STUDENT IN AN ORGANIZED HEALTH CARE EDUCATION/TRAINING PROGRAM

## 2025-04-17 PROCEDURE — 74018 RADEX ABDOMEN 1 VIEW: CPT

## 2025-04-17 PROCEDURE — 80202 ASSAY OF VANCOMYCIN: CPT

## 2025-04-17 PROCEDURE — 82330 ASSAY OF CALCIUM: CPT

## 2025-04-17 PROCEDURE — 2500000004 HC RX 250 GENERAL PHARMACY W/ HCPCS (ALT 636 FOR OP/ED): Performed by: INTERNAL MEDICINE

## 2025-04-17 PROCEDURE — 2500000001 HC RX 250 WO HCPCS SELF ADMINISTERED DRUGS (ALT 637 FOR MEDICARE OP): Performed by: STUDENT IN AN ORGANIZED HEALTH CARE EDUCATION/TRAINING PROGRAM

## 2025-04-17 PROCEDURE — 99291 CRITICAL CARE FIRST HOUR: CPT | Performed by: INTERNAL MEDICINE

## 2025-04-17 PROCEDURE — 83735 ASSAY OF MAGNESIUM: CPT

## 2025-04-17 PROCEDURE — 2500000004 HC RX 250 GENERAL PHARMACY W/ HCPCS (ALT 636 FOR OP/ED): Performed by: STUDENT IN AN ORGANIZED HEALTH CARE EDUCATION/TRAINING PROGRAM

## 2025-04-17 PROCEDURE — 85025 COMPLETE CBC W/AUTO DIFF WBC: CPT

## 2025-04-17 PROCEDURE — 85027 COMPLETE CBC AUTOMATED: CPT | Performed by: STUDENT IN AN ORGANIZED HEALTH CARE EDUCATION/TRAINING PROGRAM

## 2025-04-17 PROCEDURE — 71045 X-RAY EXAM CHEST 1 VIEW: CPT

## 2025-04-17 PROCEDURE — 99239 HOSP IP/OBS DSCHRG MGMT >30: CPT

## 2025-04-17 PROCEDURE — 95700 EEG CONT REC W/VID EEG TECH: CPT

## 2025-04-17 PROCEDURE — 84484 ASSAY OF TROPONIN QUANT: CPT

## 2025-04-17 PROCEDURE — 2500000004 HC RX 250 GENERAL PHARMACY W/ HCPCS (ALT 636 FOR OP/ED): Mod: JZ | Performed by: STUDENT IN AN ORGANIZED HEALTH CARE EDUCATION/TRAINING PROGRAM

## 2025-04-17 PROCEDURE — 80069 RENAL FUNCTION PANEL: CPT

## 2025-04-17 PROCEDURE — 99291 CRITICAL CARE FIRST HOUR: CPT

## 2025-04-17 PROCEDURE — 95716 VEEG EA 12-26HR CONT MNTR: CPT

## 2025-04-17 PROCEDURE — 74018 RADEX ABDOMEN 1 VIEW: CPT | Performed by: RADIOLOGY

## 2025-04-17 PROCEDURE — 93005 ELECTROCARDIOGRAM TRACING: CPT

## 2025-04-17 PROCEDURE — 85520 HEPARIN ASSAY: CPT | Performed by: STUDENT IN AN ORGANIZED HEALTH CARE EDUCATION/TRAINING PROGRAM

## 2025-04-17 PROCEDURE — 94003 VENT MGMT INPAT SUBQ DAY: CPT

## 2025-04-17 PROCEDURE — 85730 THROMBOPLASTIN TIME PARTIAL: CPT | Performed by: STUDENT IN AN ORGANIZED HEALTH CARE EDUCATION/TRAINING PROGRAM

## 2025-04-17 RX ORDER — DEXTROSE 50 % IN WATER (D50W) INTRAVENOUS SYRINGE
12.5
Status: DISCONTINUED | OUTPATIENT
Start: 2025-04-17 | End: 2025-04-20 | Stop reason: HOSPADM

## 2025-04-17 RX ORDER — PANTOPRAZOLE SODIUM 40 MG/1
40 TABLET, DELAYED RELEASE ORAL
Status: DISCONTINUED | OUTPATIENT
Start: 2025-04-18 | End: 2025-04-20 | Stop reason: HOSPADM

## 2025-04-17 RX ORDER — INSULIN LISPRO 100 [IU]/ML
0-5 INJECTION, SOLUTION INTRAVENOUS; SUBCUTANEOUS EVERY 4 HOURS
Status: DISCONTINUED | OUTPATIENT
Start: 2025-04-18 | End: 2025-04-20

## 2025-04-17 RX ORDER — ALBUTEROL SULFATE 0.83 MG/ML
2.5 SOLUTION RESPIRATORY (INHALATION) EVERY 6 HOURS PRN
Status: DISCONTINUED | OUTPATIENT
Start: 2025-04-17 | End: 2025-04-20 | Stop reason: HOSPADM

## 2025-04-17 RX ORDER — POTASSIUM CHLORIDE 1.5 G/1.58G
40 POWDER, FOR SOLUTION ORAL EVERY 6 HOURS PRN
Status: DISCONTINUED | OUTPATIENT
Start: 2025-04-17 | End: 2025-04-19

## 2025-04-17 RX ORDER — PANTOPRAZOLE SODIUM 40 MG/10ML
40 INJECTION, POWDER, LYOPHILIZED, FOR SOLUTION INTRAVENOUS
Status: DISCONTINUED | OUTPATIENT
Start: 2025-04-18 | End: 2025-04-20

## 2025-04-17 RX ORDER — MAGNESIUM SULFATE HEPTAHYDRATE 40 MG/ML
2 INJECTION, SOLUTION INTRAVENOUS EVERY 6 HOURS PRN
Status: DISCONTINUED | OUTPATIENT
Start: 2025-04-17 | End: 2025-04-19

## 2025-04-17 RX ORDER — INSULIN LISPRO 100 [IU]/ML
0-5 INJECTION, SOLUTION INTRAVENOUS; SUBCUTANEOUS EVERY 4 HOURS
Status: DISCONTINUED | OUTPATIENT
Start: 2025-04-17 | End: 2025-04-17

## 2025-04-17 RX ORDER — PHENOBARBITAL SODIUM 130 MG/ML
97.5 INJECTION, SOLUTION INTRAMUSCULAR; INTRAVENOUS EVERY 8 HOURS
Status: DISCONTINUED | OUTPATIENT
Start: 2025-04-17 | End: 2025-04-18

## 2025-04-17 RX ORDER — PHENOBARBITAL SODIUM 130 MG/ML
65 INJECTION, SOLUTION INTRAMUSCULAR; INTRAVENOUS EVERY 8 HOURS
Status: DISCONTINUED | OUTPATIENT
Start: 2025-04-19 | End: 2025-04-18

## 2025-04-17 RX ORDER — DEXTROSE 50 % IN WATER (D50W) INTRAVENOUS SYRINGE
25
Status: DISCONTINUED | OUTPATIENT
Start: 2025-04-17 | End: 2025-04-17

## 2025-04-17 RX ORDER — HEPARIN SODIUM 10000 [USP'U]/100ML
0-4000 INJECTION, SOLUTION INTRAVENOUS CONTINUOUS
Status: DISCONTINUED | OUTPATIENT
Start: 2025-04-17 | End: 2025-04-18

## 2025-04-17 RX ORDER — DEXTROSE 50 % IN WATER (D50W) INTRAVENOUS SYRINGE
25
Status: DISCONTINUED | OUTPATIENT
Start: 2025-04-17 | End: 2025-04-20 | Stop reason: HOSPADM

## 2025-04-17 RX ORDER — POTASSIUM CHLORIDE 14.9 MG/ML
20 INJECTION INTRAVENOUS EVERY 6 HOURS PRN
Status: DISCONTINUED | OUTPATIENT
Start: 2025-04-17 | End: 2025-04-19

## 2025-04-17 RX ORDER — POLYETHYLENE GLYCOL 3350 17 G/17G
17 POWDER, FOR SOLUTION ORAL DAILY
Status: DISCONTINUED | OUTPATIENT
Start: 2025-04-17 | End: 2025-04-20 | Stop reason: HOSPADM

## 2025-04-17 RX ORDER — FOLIC ACID 1 MG/1
1 TABLET ORAL DAILY
Status: DISCONTINUED | OUTPATIENT
Start: 2025-04-17 | End: 2025-04-20 | Stop reason: HOSPADM

## 2025-04-17 RX ORDER — DEXMEDETOMIDINE HYDROCHLORIDE 4 UG/ML
.1-1.5 INJECTION, SOLUTION INTRAVENOUS CONTINUOUS
Status: DISCONTINUED | OUTPATIENT
Start: 2025-04-17 | End: 2025-04-17

## 2025-04-17 RX ORDER — ESOMEPRAZOLE MAGNESIUM 40 MG/1
40 GRANULE, DELAYED RELEASE ORAL
Status: DISCONTINUED | OUTPATIENT
Start: 2025-04-18 | End: 2025-04-20

## 2025-04-17 RX ORDER — FENTANYL CITRATE-0.9 % NACL/PF 10 MCG/ML
25-200 PLASTIC BAG, INJECTION (ML) INTRAVENOUS CONTINUOUS
Status: DISCONTINUED | OUTPATIENT
Start: 2025-04-17 | End: 2025-04-17

## 2025-04-17 RX ORDER — PROPOFOL 10 MG/ML
0-50 INJECTION, EMULSION INTRAVENOUS CONTINUOUS
Status: DISCONTINUED | OUTPATIENT
Start: 2025-04-17 | End: 2025-04-17

## 2025-04-17 RX ORDER — DIAZEPAM 5 MG/1
10 TABLET ORAL EVERY 2 HOUR PRN
Status: DISCONTINUED | OUTPATIENT
Start: 2025-04-17 | End: 2025-04-18

## 2025-04-17 RX ORDER — VANCOMYCIN HYDROCHLORIDE 1 G/20ML
INJECTION, POWDER, LYOPHILIZED, FOR SOLUTION INTRAVENOUS DAILY PRN
Status: DISCONTINUED | OUTPATIENT
Start: 2025-04-17 | End: 2025-04-19

## 2025-04-17 RX ORDER — VANCOMYCIN 2 GRAM/500 ML IN 0.9 % SODIUM CHLORIDE INTRAVENOUS
2000 ONCE
Status: COMPLETED | OUTPATIENT
Start: 2025-04-17 | End: 2025-04-17

## 2025-04-17 RX ORDER — THIAMINE HYDROCHLORIDE 100 MG/ML
500 INJECTION, SOLUTION INTRAMUSCULAR; INTRAVENOUS EVERY 8 HOURS SCHEDULED
Status: COMPLETED | OUTPATIENT
Start: 2025-04-17 | End: 2025-04-20

## 2025-04-17 RX ORDER — POTASSIUM CHLORIDE 1.5 G/1.58G
20 POWDER, FOR SOLUTION ORAL EVERY 6 HOURS PRN
Status: DISCONTINUED | OUTPATIENT
Start: 2025-04-17 | End: 2025-04-19

## 2025-04-17 RX ORDER — MAGNESIUM SULFATE HEPTAHYDRATE 40 MG/ML
4 INJECTION, SOLUTION INTRAVENOUS EVERY 6 HOURS PRN
Status: DISCONTINUED | OUTPATIENT
Start: 2025-04-17 | End: 2025-04-19

## 2025-04-17 RX ORDER — CALCIUM GLUCONATE 20 MG/ML
1 INJECTION, SOLUTION INTRAVENOUS EVERY 6 HOURS PRN
Status: DISCONTINUED | OUTPATIENT
Start: 2025-04-17 | End: 2025-04-19

## 2025-04-17 RX ORDER — DEXTROSE 50 % IN WATER (D50W) INTRAVENOUS SYRINGE
12.5
Status: DISCONTINUED | OUTPATIENT
Start: 2025-04-17 | End: 2025-04-17

## 2025-04-17 RX ORDER — PHENOBARBITAL SODIUM 130 MG/ML
32.5 INJECTION, SOLUTION INTRAMUSCULAR; INTRAVENOUS EVERY 8 HOURS
Status: DISCONTINUED | OUTPATIENT
Start: 2025-04-21 | End: 2025-04-18

## 2025-04-17 RX ORDER — LANOLIN ALCOHOL/MO/W.PET/CERES
100 CREAM (GRAM) TOPICAL DAILY
Status: DISCONTINUED | OUTPATIENT
Start: 2025-04-20 | End: 2025-04-20 | Stop reason: HOSPADM

## 2025-04-17 RX ORDER — AMOXICILLIN 250 MG
2 CAPSULE ORAL 2 TIMES DAILY PRN
Status: DISCONTINUED | OUTPATIENT
Start: 2025-04-17 | End: 2025-04-20 | Stop reason: HOSPADM

## 2025-04-17 RX ORDER — ALBUTEROL SULFATE 0.83 MG/ML
SOLUTION RESPIRATORY (INHALATION)
Status: COMPLETED
Start: 2025-04-17 | End: 2025-04-17

## 2025-04-17 RX ORDER — HEPARIN SODIUM 1000 [USP'U]/ML
INJECTION, SOLUTION INTRAVENOUS; SUBCUTANEOUS
Status: DISPENSED
Start: 2025-04-17 | End: 2025-04-18

## 2025-04-17 RX ORDER — POTASSIUM CHLORIDE 20 MEQ/1
20 TABLET, EXTENDED RELEASE ORAL EVERY 6 HOURS PRN
Status: DISCONTINUED | OUTPATIENT
Start: 2025-04-17 | End: 2025-04-19

## 2025-04-17 RX ORDER — MULTIVIT-MIN/IRON FUM/FOLIC AC 7.5 MG-4
1 TABLET ORAL DAILY
Status: DISCONTINUED | OUTPATIENT
Start: 2025-04-17 | End: 2025-04-20 | Stop reason: HOSPADM

## 2025-04-17 RX ORDER — POTASSIUM CHLORIDE 20 MEQ/1
40 TABLET, EXTENDED RELEASE ORAL EVERY 6 HOURS PRN
Status: DISCONTINUED | OUTPATIENT
Start: 2025-04-17 | End: 2025-04-19

## 2025-04-17 RX ORDER — LEVETIRACETAM 500 MG/1
1500 TABLET ORAL 2 TIMES DAILY
Status: DISCONTINUED | OUTPATIENT
Start: 2025-04-17 | End: 2025-04-19

## 2025-04-17 RX ORDER — SODIUM CHLORIDE 9 MG/ML
50 INJECTION, SOLUTION INTRAVENOUS CONTINUOUS
Status: ACTIVE | OUTPATIENT
Start: 2025-04-17 | End: 2025-04-18

## 2025-04-17 RX ORDER — PHENOBARBITAL SODIUM 130 MG/ML
65 INJECTION, SOLUTION INTRAMUSCULAR; INTRAVENOUS EVERY 6 HOURS PRN
Status: DISCONTINUED | OUTPATIENT
Start: 2025-04-17 | End: 2025-04-19

## 2025-04-17 RX ORDER — CALCIUM GLUCONATE 20 MG/ML
2 INJECTION, SOLUTION INTRAVENOUS EVERY 6 HOURS PRN
Status: DISCONTINUED | OUTPATIENT
Start: 2025-04-17 | End: 2025-04-19

## 2025-04-17 RX ADMIN — LEVETIRACETAM 1500 MG: 500 TABLET, FILM COATED ORAL at 20:42

## 2025-04-17 RX ADMIN — SODIUM CHLORIDE, SODIUM LACTATE, POTASSIUM CHLORIDE, AND CALCIUM CHLORIDE 150 ML: .6; .31; .03; .02 INJECTION, SOLUTION INTRAVENOUS at 06:51

## 2025-04-17 RX ADMIN — DEXMEDETOMIDINE HYDROCHLORIDE 1.12 MCG/KG/HR: 400 INJECTION INTRAVENOUS at 06:00

## 2025-04-17 RX ADMIN — PIPERACILLIN SODIUM AND TAZOBACTAM SODIUM 4.5 G: 4; .5 INJECTION, SOLUTION INTRAVENOUS at 23:56

## 2025-04-17 RX ADMIN — PIPERACILLIN SODIUM AND TAZOBACTAM SODIUM 4.5 G: 4; .5 INJECTION, SOLUTION INTRAVENOUS at 12:48

## 2025-04-17 RX ADMIN — THIAMINE HYDROCHLORIDE 500 MG: 100 INJECTION, SOLUTION INTRAMUSCULAR; INTRAVENOUS at 14:19

## 2025-04-17 RX ADMIN — HEPARIN SODIUM 800 UNITS/HR: 10000 INJECTION, SOLUTION INTRAVENOUS at 14:56

## 2025-04-17 RX ADMIN — THIAMINE HYDROCHLORIDE 500 MG: 100 INJECTION, SOLUTION INTRAMUSCULAR; INTRAVENOUS at 00:06

## 2025-04-17 RX ADMIN — ALBUTEROL SULFATE 2.5 MG: 2.5 SOLUTION RESPIRATORY (INHALATION) at 14:47

## 2025-04-17 RX ADMIN — POLYETHYLENE GLYCOL 3350 17 G: 17 POWDER, FOR SOLUTION ORAL at 12:48

## 2025-04-17 RX ADMIN — Medication 50 L/MIN: at 14:47

## 2025-04-17 RX ADMIN — Medication 1 TABLET: at 12:48

## 2025-04-17 RX ADMIN — PHENOBARBITAL SODIUM 65 MG: 130 INJECTION INTRAMUSCULAR; INTRAVENOUS at 23:09

## 2025-04-17 RX ADMIN — Medication 50 MCG/HR: at 10:15

## 2025-04-17 RX ADMIN — PROPOFOL 25 MCG/KG/MIN: 10 INJECTION, EMULSION INTRAVENOUS at 04:38

## 2025-04-17 RX ADMIN — Medication 25 MCG/HR: at 08:22

## 2025-04-17 RX ADMIN — LEVETIRACETAM 1500 MG: 500 TABLET, FILM COATED ORAL at 14:00

## 2025-04-17 RX ADMIN — PHENOBARBITAL SODIUM 97.5 MG: 130 INJECTION INTRAMUSCULAR; INTRAVENOUS at 12:46

## 2025-04-17 RX ADMIN — SODIUM CHLORIDE, SODIUM LACTATE, POTASSIUM CHLORIDE, AND CALCIUM CHLORIDE 150 ML: .6; .31; .03; .02 INJECTION, SOLUTION INTRAVENOUS at 00:02

## 2025-04-17 RX ADMIN — FOLIC ACID 1 MG: 1 TABLET ORAL at 12:49

## 2025-04-17 RX ADMIN — DIAZEPAM 10 MG: 5 TABLET ORAL at 22:19

## 2025-04-17 RX ADMIN — PIPERACILLIN SODIUM AND TAZOBACTAM SODIUM 4.5 G: 4; .5 INJECTION, SOLUTION INTRAVENOUS at 18:39

## 2025-04-17 RX ADMIN — ENOXAPARIN SODIUM 40 MG: 40 INJECTION SUBCUTANEOUS at 03:10

## 2025-04-17 RX ADMIN — AMPICILLIN SODIUM 2 G: 2 INJECTION, POWDER, FOR SOLUTION INTRAMUSCULAR; INTRAVENOUS at 03:10

## 2025-04-17 RX ADMIN — PROPOFOL 25 MCG/KG/MIN: 10 INJECTION, EMULSION INTRAVENOUS at 13:57

## 2025-04-17 RX ADMIN — Medication 2000 MG: at 21:36

## 2025-04-17 RX ADMIN — DEXMEDETOMIDINE HYDROCHLORIDE 1.12 MCG/KG/HR: 400 INJECTION INTRAVENOUS at 03:51

## 2025-04-17 RX ADMIN — POTASSIUM CHLORIDE 20 MEQ: 1.5 POWDER, FOR SOLUTION ORAL at 18:39

## 2025-04-17 RX ADMIN — SODIUM CHLORIDE 50 ML/HR: 0.9 INJECTION, SOLUTION INTRAVENOUS at 18:41

## 2025-04-17 RX ADMIN — DEXTROSE MONOHYDRATE 750 MG: 50 INJECTION, SOLUTION INTRAVENOUS at 05:09

## 2025-04-17 RX ADMIN — DIAZEPAM 10 MG: 5 TABLET ORAL at 16:39

## 2025-04-17 RX ADMIN — PHENOBARBITAL SODIUM 97.5 MG: 130 INJECTION INTRAMUSCULAR; INTRAVENOUS at 20:41

## 2025-04-17 RX ADMIN — ACYCLOVIR SODIUM 750 MG: 50 INJECTION, SOLUTION INTRAVENOUS at 04:00

## 2025-04-17 RX ADMIN — PROPOFOL 25 MCG/KG/MIN: 10 INJECTION, EMULSION INTRAVENOUS at 08:30

## 2025-04-17 RX ADMIN — Medication 50 PERCENT: at 10:09

## 2025-04-17 RX ADMIN — DEXMEDETOMIDINE HYDROCHLORIDE 0.84 MCG/KG/HR: 400 INJECTION INTRAVENOUS at 08:22

## 2025-04-17 RX ADMIN — DEXTROSE MONOHYDRATE 12.5 G: 25 INJECTION, SOLUTION INTRAVENOUS at 23:56

## 2025-04-17 RX ADMIN — ALBUTEROL SULFATE 2.5 MG: 0.83 SOLUTION RESPIRATORY (INHALATION) at 14:47

## 2025-04-17 RX ADMIN — PROPOFOL 25 MCG/KG/MIN: 10 INJECTION, EMULSION INTRAVENOUS at 00:55

## 2025-04-17 RX ADMIN — PROPOFOL 25 MCG/KG/MIN: 10 INJECTION, EMULSION INTRAVENOUS at 10:15

## 2025-04-17 RX ADMIN — DEXMEDETOMIDINE HYDROCHLORIDE 1.5 MCG/KG/HR: 400 INJECTION INTRAVENOUS at 01:33

## 2025-04-17 RX ADMIN — DEXMEDETOMIDINE HYDROCHLORIDE 0.1 MCG/KG/HR: 400 INJECTION INTRAVENOUS at 13:59

## 2025-04-17 RX ADMIN — PHENOBARBITAL SODIUM 97.5 MG: 65 INJECTION INTRAMUSCULAR at 03:08

## 2025-04-17 RX ADMIN — THIAMINE HYDROCHLORIDE 500 MG: 100 INJECTION, SOLUTION INTRAMUSCULAR; INTRAVENOUS at 21:33

## 2025-04-17 RX ADMIN — HEPARIN SODIUM 1100 UNITS/HR: 10000 INJECTION, SOLUTION INTRAVENOUS at 20:44

## 2025-04-17 SDOH — SOCIAL STABILITY: SOCIAL INSECURITY: WITHIN THE LAST YEAR, HAVE YOU BEEN AFRAID OF YOUR PARTNER OR EX-PARTNER?: PATIENT UNABLE TO ANSWER

## 2025-04-17 SDOH — SOCIAL STABILITY: SOCIAL INSECURITY: HAVE YOU HAD THOUGHTS OF HARMING ANYONE ELSE?: UNABLE TO ASSESS

## 2025-04-17 SDOH — SOCIAL STABILITY: SOCIAL INSECURITY: ARE THERE ANY APPARENT SIGNS OF INJURIES/BEHAVIORS THAT COULD BE RELATED TO ABUSE/NEGLECT?: UNABLE TO ASSESS

## 2025-04-17 SDOH — SOCIAL STABILITY: SOCIAL INSECURITY: DO YOU FEEL ANYONE HAS EXPLOITED OR TAKEN ADVANTAGE OF YOU FINANCIALLY OR OF YOUR PERSONAL PROPERTY?: UNABLE TO ASSESS

## 2025-04-17 SDOH — SOCIAL STABILITY: SOCIAL INSECURITY: HAS ANYONE EVER THREATENED TO HURT YOUR FAMILY OR YOUR PETS?: UNABLE TO ASSESS

## 2025-04-17 SDOH — SOCIAL STABILITY: SOCIAL INSECURITY: WERE YOU ABLE TO COMPLETE ALL THE BEHAVIORAL HEALTH SCREENINGS?: NO

## 2025-04-17 SDOH — SOCIAL STABILITY: SOCIAL INSECURITY: HAVE YOU HAD ANY THOUGHTS OF HARMING ANYONE ELSE?: UNABLE TO ASSESS

## 2025-04-17 SDOH — SOCIAL STABILITY: SOCIAL INSECURITY: DOES ANYONE TRY TO KEEP YOU FROM HAVING/CONTACTING OTHER FRIENDS OR DOING THINGS OUTSIDE YOUR HOME?: UNABLE TO ASSESS

## 2025-04-17 SDOH — SOCIAL STABILITY: SOCIAL INSECURITY: ABUSE: ADULT

## 2025-04-17 SDOH — SOCIAL STABILITY: SOCIAL INSECURITY: ARE YOU OR HAVE YOU BEEN THREATENED OR ABUSED PHYSICALLY, EMOTIONALLY, OR SEXUALLY BY ANYONE?: UNABLE TO ASSESS

## 2025-04-17 SDOH — SOCIAL STABILITY: SOCIAL INSECURITY: DO YOU FEEL UNSAFE GOING BACK TO THE PLACE WHERE YOU ARE LIVING?: UNABLE TO ASSESS

## 2025-04-17 ASSESSMENT — ACTIVITIES OF DAILY LIVING (ADL)
PATIENT'S MEMORY ADEQUATE TO SAFELY COMPLETE DAILY ACTIVITIES?: UNABLE TO ASSESS
DRESSING YOURSELF: UNABLE TO ASSESS
HEARING - LEFT EAR: UNABLE TO ASSESS
GROOMING: UNABLE TO ASSESS
HEARING - RIGHT EAR: UNABLE TO ASSESS
LACK_OF_TRANSPORTATION: PATIENT UNABLE TO ANSWER
BATHING: UNABLE TO ASSESS
TOILETING: UNABLE TO ASSESS
WALKS IN HOME: UNABLE TO ASSESS
JUDGMENT_ADEQUATE_SAFELY_COMPLETE_DAILY_ACTIVITIES: UNABLE TO ASSESS
FEEDING YOURSELF: UNABLE TO ASSESS
ADEQUATE_TO_COMPLETE_ADL: UNABLE TO ASSESS

## 2025-04-17 ASSESSMENT — COGNITIVE AND FUNCTIONAL STATUS - GENERAL
WALKING IN HOSPITAL ROOM: A LOT
HELP NEEDED FOR BATHING: A LOT
CLIMB 3 TO 5 STEPS WITH RAILING: A LOT
STANDING UP FROM CHAIR USING ARMS: TOTAL
MOVING FROM LYING ON BACK TO SITTING ON SIDE OF FLAT BED WITH BEDRAILS: TOTAL
MOBILITY SCORE: 6
MOVING TO AND FROM BED TO CHAIR: A LOT
MOVING TO AND FROM BED TO CHAIR: TOTAL
TURNING FROM BACK TO SIDE WHILE IN FLAT BAD: TOTAL
EATING MEALS: A LOT
EATING MEALS: TOTAL
CLIMB 3 TO 5 STEPS WITH RAILING: TOTAL
PERSONAL GROOMING: A LOT
HELP NEEDED FOR BATHING: TOTAL
TOILETING: TOTAL
DRESSING REGULAR UPPER BODY CLOTHING: A LOT
DAILY ACTIVITIY SCORE: 12
TOILETING: A LOT
DRESSING REGULAR LOWER BODY CLOTHING: A LOT
MOBILITY SCORE: 12
PATIENT BASELINE BEDBOUND: UNABLE TO ASSESS AT THIS TIME
TURNING FROM BACK TO SIDE WHILE IN FLAT BAD: A LOT
PERSONAL GROOMING: TOTAL
DRESSING REGULAR LOWER BODY CLOTHING: TOTAL
STANDING UP FROM CHAIR USING ARMS: A LOT
DRESSING REGULAR UPPER BODY CLOTHING: TOTAL
WALKING IN HOSPITAL ROOM: TOTAL
MOVING FROM LYING ON BACK TO SITTING ON SIDE OF FLAT BED WITH BEDRAILS: A LOT
DAILY ACTIVITIY SCORE: 6

## 2025-04-17 ASSESSMENT — LIFESTYLE VARIABLES
TACTILE DISTURBANCES: MILD ITCHING, PINS AND NEEDLES, BURNING OR NUMBNESS
TOTAL SCORE: 21
NAUSEA AND VOMITING: MILD NAUSEA WITH NO VOMITING
PAROXYSMAL SWEATS: NO SWEAT VISIBLE
AUDITORY DISTURBANCES: NOT PRESENT
AUDIT-C TOTAL SCORE: -1
ANXIETY: NO ANXIETY, AT EASE
ORIENTATION AND CLOUDING OF SENSORIUM: DISORIENTED FOR DATE BY MORE THAN 2 CALENDAR DAYS
NAUSEA AND VOMITING: NO NAUSEA AND NO VOMITING
AGITATION: NORMAL ACTIVITY
VISUAL DISTURBANCES: SEVERE HALLUCINATIONS
NAUSEA AND VOMITING: NO NAUSEA AND NO VOMITING
PAROXYSMAL SWEATS: NO SWEAT VISIBLE
ANXIETY: 2
PULSE: 101
HEADACHE, FULLNESS IN HEAD: NOT PRESENT
VISUAL DISTURBANCES: NOT PRESENT
HEADACHE, FULLNESS IN HEAD: NOT PRESENT
HEADACHE, FULLNESS IN HEAD: NOT PRESENT
TREMOR: NO TREMOR
AGITATION: NORMAL ACTIVITY
PAROXYSMAL SWEATS: NO SWEAT VISIBLE
AUDITORY DISTURBANCES: NOT PRESENT
TREMOR: NOT VISIBLE, BUT CAN BE FELT FINGERTIP TO FINGERTIP
HOW OFTEN DO YOU HAVE 6 OR MORE DRINKS ON ONE OCCASION: PATIENT UNABLE TO ANSWER
AGITATION: NORMAL ACTIVITY
AUDITORY DISTURBANCES: VERY MILD HARSHNESS OR ABILITY TO FRIGHTEN
ANXIETY: 2
NAUSEA AND VOMITING: NO NAUSEA AND NO VOMITING
VISUAL DISTURBANCES: SEVERE HALLUCINATIONS
AGITATION: 3
PAROXYSMAL SWEATS: NO SWEAT VISIBLE
BLOOD PRESSURE: 129/64
HOW MANY STANDARD DRINKS CONTAINING ALCOHOL DO YOU HAVE ON A TYPICAL DAY: PATIENT UNABLE TO ANSWER
SKIP TO QUESTIONS 9-10: 0
TOTAL SCORE: 11
ORIENTATION AND CLOUDING OF SENSORIUM: DISORIENTED FOR PLACE OR PERSON
ORIENTATION AND CLOUDING OF SENSORIUM: DISORIENTED FOR DATE BY MORE THAN 2 CALENDAR DAYS
HEADACHE, FULLNESS IN HEAD: NOT PRESENT
TREMOR: MODERATE, WITH PATIENT'S ARMS EXTENDED
NAUSEA AND VOMITING: NO NAUSEA AND NO VOMITING
ORIENTATION AND CLOUDING OF SENSORIUM: DISORIENTED FOR PLACE OR PERSON
ANXIETY: 6
TOTAL SCORE: 11
ANXIETY: MILDLY ANXIOUS
TREMOR: NOT VISIBLE, BUT CAN BE FELT FINGERTIP TO FINGERTIP
AGITATION: NORMAL ACTIVITY
HOW OFTEN DO YOU HAVE A DRINK CONTAINING ALCOHOL: PATIENT UNABLE TO ANSWER
TREMOR: NOT VISIBLE, BUT CAN BE FELT FINGERTIP TO FINGERTIP
AUDIT-C TOTAL SCORE: -1
TOTAL SCORE: 13
VISUAL DISTURBANCES: CONTINUOUS HALLUCINATIONS
AUDITORY DISTURBANCES: NOT PRESENT
PAROXYSMAL SWEATS: NO SWEAT VISIBLE

## 2025-04-17 ASSESSMENT — PATIENT HEALTH QUESTIONNAIRE - PHQ9
SUM OF ALL RESPONSES TO PHQ9 QUESTIONS 1 & 2: 0
1. LITTLE INTEREST OR PLEASURE IN DOING THINGS: NOT AT ALL
2. FEELING DOWN, DEPRESSED OR HOPELESS: NOT AT ALL

## 2025-04-17 ASSESSMENT — PAIN - FUNCTIONAL ASSESSMENT

## 2025-04-17 ASSESSMENT — COLUMBIA-SUICIDE SEVERITY RATING SCALE - C-SSRS
2. HAVE YOU ACTUALLY HAD ANY THOUGHTS OF KILLING YOURSELF?: NO
6. HAVE YOU EVER DONE ANYTHING, STARTED TO DO ANYTHING, OR PREPARED TO DO ANYTHING TO END YOUR LIFE?: NO
1. IN THE PAST MONTH, HAVE YOU WISHED YOU WERE DEAD OR WISHED YOU COULD GO TO SLEEP AND NOT WAKE UP?: NO

## 2025-04-17 ASSESSMENT — PAIN SCALES - GENERAL: PAINLEVEL_OUTOF10: 0 - NO PAIN

## 2025-04-17 NOTE — PROGRESS NOTES
CRITICAL CARE MEDICINE ATTENDING NOTE    I saw and evaluated the patient. I personally obtained the key and critical portions of the history and physical examination. I reviewed the NP's documentation and discussed the patient with the resident.     The patient has high probability of sudden, clinically significant deterioration, which requires urgent interventions. I managed/supervised life supporting interventions that required frequent physician assessment. I spent 40 critical care minutes of my full attention on this patient's management and direct patient care. Time I spent with family or surrogate(s) is included if the patient was incapable of providing information or participating in medical decision making. Time devoted to teaching and to any procedures I billed separately is not included. Medical issues requiring critical care management include:    Elvis Masters is a 64 y.o. male on day 2 of admission presenting with Seizure (Multi).    Subjective   No seizure activity noticed  This morning he was examined, comatose and without abnormal movements while on propofol.     Objective     Physical Exam  Vitals and nursing note reviewed.   Constitutional:       General: He is not in acute distress.     Appearance: He is ill-appearing.      Comments: -Comatose, intubated and receiving mechanical ventilation  -Flaccid extremities, no abnormal movements  -TRUE   HENT:      Head: Normocephalic.      Mouth/Throat:      Mouth: Mucous membranes are dry.   Eyes:      Pupils: Pupils are equal, round, and reactive to light.   Cardiovascular:      Rate and Rhythm: Normal rate and regular rhythm.      Pulses: Normal pulses.      Heart sounds: Normal heart sounds.   Pulmonary:      Effort: Pulmonary effort is normal.      Breath sounds: Normal breath sounds.   Abdominal:      General: Bowel sounds are normal.      Palpations: Abdomen is soft.      Tenderness: There is no guarding or rebound.   Musculoskeletal:      Cervical  "back: Normal range of motion and neck supple.   Skin:     General: Skin is warm.      Capillary Refill: Capillary refill takes less than 2 seconds.         Last Recorded Vitals  Blood pressure 115/75, pulse 55, temperature 35.4 °C (95.7 °F), resp. rate 20, height 1.879 m (6' 1.98\"), weight (!) 159 kg (351 lb 6.6 oz), SpO2 96%.  Intake/Output last 3 Shifts:  I/O last 3 completed shifts:  In: 5807.7 (36.4 mL/kg) [I.V.:3636.9 (22.8 mL/kg); IV Piggyback:2170.8]  Out: 1370 (8.6 mL/kg) [Urine:1370 (0.2 mL/kg/hr)]  Weight: 159.4 kg     Relevant Results  Results for orders placed or performed during the hospital encounter of 04/15/25 (from the past 24 hours)   Electrocardiogram, 12-lead PRN ACS symptoms   Result Value Ref Range    Ventricular Rate 99 BPM    Atrial Rate 99 BPM    NH Interval 181 ms    QRS Duration 102 ms    QT Interval 351 ms    QTC Calculation(Bazett) 451 ms    P Axis 50 degrees    R Axis 75 degrees    T Axis 14 degrees    QRS Count 16 beats    Q Onset 254 ms    T Offset 430 ms    QTC Fredericia 414 ms   ECG 12 lead   Result Value Ref Range    Ventricular Rate 105 BPM    Atrial Rate 105 BPM    NH Interval 177 ms    QRS Duration 107 ms    QT Interval 384 ms    QTC Calculation(Bazett) 508 ms    P Axis 53 degrees    R Axis 75 degrees    T Axis 28 degrees    QRS Count 16 beats    Q Onset 253 ms    T Offset 445 ms    QTC Fredericia 462 ms   Magnesium   Result Value Ref Range    Magnesium 2.05 1.60 - 2.40 mg/dL   CBC and Auto Differential   Result Value Ref Range    WBC 4.3 (L) 4.4 - 11.3 x10*3/uL    nRBC 0.0 0.0 - 0.0 /100 WBCs    RBC 3.54 (L) 4.50 - 5.90 x10*6/uL    Hemoglobin 10.7 (L) 13.5 - 17.5 g/dL    Hematocrit 33.1 (L) 41.0 - 52.0 %    MCV 94 80 - 100 fL    MCH 30.2 26.0 - 34.0 pg    MCHC 32.3 32.0 - 36.0 g/dL    RDW 15.0 (H) 11.5 - 14.5 %    Platelets 117 (L) 150 - 450 x10*3/uL    Neutrophils % 68.7 40.0 - 80.0 %    Immature Granulocytes %, Automated 0.5 0.0 - 0.9 %    Lymphocytes % 20.2 13.0 - 44.0 %    " Monocytes % 6.7 2.0 - 10.0 %    Eosinophils % 3.7 0.0 - 6.0 %    Basophils % 0.2 0.0 - 2.0 %    Neutrophils Absolute 2.96 1.20 - 7.70 x10*3/uL    Immature Granulocytes Absolute, Automated 0.02 0.00 - 0.70 x10*3/uL    Lymphocytes Absolute 0.87 (L) 1.20 - 4.80 x10*3/uL    Monocytes Absolute 0.29 0.10 - 1.00 x10*3/uL    Eosinophils Absolute 0.16 0.00 - 0.70 x10*3/uL    Basophils Absolute 0.01 0.00 - 0.10 x10*3/uL   Renal function panel   Result Value Ref Range    Glucose 137 (H) 74 - 99 mg/dL    Sodium 136 136 - 145 mmol/L    Potassium 3.3 (L) 3.5 - 5.3 mmol/L    Chloride 98 98 - 107 mmol/L    Bicarbonate 29 21 - 32 mmol/L    Anion Gap 12 10 - 20 mmol/L    Urea Nitrogen 12 6 - 23 mg/dL    Creatinine 1.48 (H) 0.50 - 1.30 mg/dL    eGFR 53 (L) >60 mL/min/1.73m*2    Calcium 7.9 (L) 8.6 - 10.3 mg/dL    Phosphorus 3.4 2.5 - 4.9 mg/dL    Albumin 3.2 (L) 3.4 - 5.0 g/dL     Assessment & Plan  Seizure (Multi)    64 year old man with PMH of atrial Fibrillation (on Eliquis), HFpEF, epilepsy, HTN, DM type II, alcohol use disorder with acute hypoxemic respiratory failure, breakthrough seizures, left lower lobe pneumonia, alcohol withdrawal syndrome.     1) Acute hypoxemic respiratory failure secondary to pneumonia (gram positives) and acute alcohol withdrawal syndrome  --Continue with mechanical ventilation with lung protective strategy.  --Antibiotic regimen: Ceftriaxone, vancomycin, acyclovir,   --Cultures reviewed: Gram positives in sputum culture  --Spontaneous breathing trial: Not done    2) Alcohol withdrawal syndrome with delirium tremens  --Management with phenobabital and benzodiazepines +Propofol gtt  --Thiamine high dose for management of Wernicke encephalopathy    3) Epilepsy with breakthorugh seizures  --Management with levetiracetam, benzodiazepines and propofol  --Broadened antimicrobial spectrum to cover meningoencephalitis (ceftriaxone, vancomycin, acyclovir and ampicillin)  --CT head with no hemorrhage, normal  appearance of brain.    4) JARVIS improving  --Etiology most likely hypovolemic, continueIV hydration     5) Type 2 AMI vs NSTEMI  --No focal motion abnormalities on echocardiogram  --Anticoagulation with aspirin    6) Atrial fibrillation   --Anticoagulation: heparin infusion held, needs to be restarted    7) Prolonged Qtc  --Avoid medications that prolong QTc    DVT prophylaxis - Enoxaparin SQ  Nutrition: Tube feedings not started, unable to insert OG  Vascular catheters:  Peripheral lines  Urinary catheter needed for strict input/output      [place] [facility] [place]    Jazmin Gunter MD  Pulmonary and Critical Care Medicine

## 2025-04-17 NOTE — DISCHARGE SUMMARY
Discharge Diagnosis  Seizure (Multi) h/x of Frontal lobe seizures and ETOH withdrawal seizures  Acute hypoxemic respiratory failure secondary to pneumonia (gram positives) and acute alcohol withdrawal syndrome   Alcohol withdrawal syndrome with delirium tremens   Epilepsy with breakthorugh seizure   JARVIS   Type 2 AMI vs NSTEM   Prolonged Qtc     Issues Requiring Follow-Up  Hypokalemia correction: K 3.3 (am labs results delayed-unable to replete prior to transfer)  OG/NG/dophoff placement: Several attempts to place unsuccessful    Test Results Pending At Discharge  Resp culture: Moderate Mixed Gram Positive  Staph/MRSA (Nares): Not isolated    Hospital Course  63 y/o male with PMHX of DISORDER with withdrawals, seizures (on Keppra), HTN, pre-- DM, A-fib (on Eliquis), KAYODE (noncompliant W/CPAP), COLIN, and depression presents to the ED today from home where her mother witnessed seizure activity that lasted approximately 10 minutes.  Patient intubated for worsening agitation and restlessness despite sedation efforts 50mg IV Benadryl, 5mg Haldol, and 2mg x3 doses of Ativan.  More seizure-like activity observed after transfer to ICU, phenobarbital taper initiated     4/15: Sedation held for neuroexam to rule out need for continuous EEG monitoring, patient agitated and following commands to move legs-sedation restarted for increased agitation     4/16: Patient remains intubated on sedation. Hemodynamically stable with Sp02 93% on 50% FiO2. UOP decreased overnight: Improved with LR bolus. Creatinine 2.68 with elevated Phos 6.3 and Mg 2.41. IV fluids ordered. EEG planned for 0830 then plan for SAT for possible SBT.      Addendum: Attempted to transition patient over to Precedex from Propofol for possible SBT: Patient opening eyes but not following any direction with increasing agitation. While propofol being titrated up, patient lost IV access and while off propofol a breakthrough tonic clonic seizure occurred that abated with  lorazepam administration and patent IV access. Neuro APRN present and discussed plan of care with attending and recommends transfer to [facility] [place] ICU for continuous EEG.         Pertinent Physical Exam At Time of Discharge  Physical Exam  Constitutional:       General: He is not in acute distress.     Appearance: He is obese.      Interventions: He is sedated, intubated and restrained.   HENT:      Head: Normocephalic and atraumatic.      Mouth/Throat:      Mouth: Mucous membranes are moist.      Pharynx: Oropharynx is clear.   Eyes:      Extraocular Movements: Extraocular movements intact.      Pupils: Pupils are equal, round, and reactive to light.   Cardiovascular:      Rate and Rhythm: Normal rate and regular rhythm.   Pulmonary:      Effort: He is intubated.      Breath sounds: Examination of the right-lower field reveals decreased breath sounds. Examination of the left-lower field reveals decreased breath sounds. Decreased breath sounds present.   Abdominal:      General: Abdomen is protuberant. Bowel sounds are decreased.   Musculoskeletal:      Cervical back: Normal range of motion.      Comments: Flaccid with sedation   Skin:     General: Skin is warm and dry.      Capillary Refill: Capillary refill takes less than 2 seconds.   Neurological:      Comments: Comatose with no abnormal movements   Psychiatric:      Comments: Intubated and sedated       Current Medications:  Sedation: Propofol/Fentanyl/Precedex  Thiamine (Wenicke enceph)  Keppra, benzodiazepines  Phenobarb taper  Fluids: LR @ 150 mL/hr  Empiric Abx therapy (CAP and meningoencephalitis : Acyclovir, ampicillin, ceftriaxone, vanc    Dispo:  Patient transferred to [facility] [place] ICU for tertiary management    Flor Gordon APRN-CNP  Pulmonary and Critical Care Medicine

## 2025-04-17 NOTE — PROGRESS NOTES
[facility]  NEUROSCIENCE INTENSIVE CARE UNIT  DAILY PROGRESS NOTE       Patient Name: Elvis Masters   MRN: [id number]     Admit Date: 2025     : 1960 AGE: 64 y.o. GENDER: male        Subjective    64 year-old man with past medical history of hypertension, hyperlipidemia, atrial fibrillation (on Eliquis), HFpEF, alcohol use disorder, seizure disorder (on Keppra) presented on 4/15/2025 with EtOH withdraw and seizure. Per chart review, patient had GTC witnessed by mother lasting ~10 minutes. He subsequently had three episodes of seizure-like activity at [facility] and was loaded with 4.5g Keppra. He was transferred to [facility]U for cvEEG monitoring.    Significant Events:  - 4/15: Intubated at OSH for prolonged postictal status and for airway protection  - : Transferred to [facility] for cvEEG monitoring    Interval Events: Transferred to [facility] from [facility] ICU     Objective   VITALS (24H):  Temp:  [35.4 °C (95.7 °F)-37.6 °C (99.7 °F)] 35.7 °C (96.3 °F)  Heart Rate:  [54-79] 57  Resp:  [16-25] 16  BP: (101-124)/(57-81) 106/73  FiO2 (%):  [50 %-90 %] 50 %  INTAKE/OUTPUT:  Intake/Output Summary (Last 24 hours) at 2025 1408  Last data filed at 2025 1356  Gross per 24 hour   Intake 100 ml   Output --   Net 100 ml     VENT SETTINGS:  Vent Mode: Volume control/assist control  FiO2 (%):  [50 %-90 %] 50 %  S RR:  [16-20] 16  S VT:  [500 mL] 500 mL  PEEP/CPAP (cm H2O):  [8 cm H20-10 cm H20] 10 cm H20  MAP (cm H2O):  [13-15] 15     PHYSICAL EXAM:  NEURO:  - Intubated, sedation paused  - Follows commands  - EOV, pupils 2mm equal and reactive bilaterally, EOMI  - SANCHEZ spontaneously/AG, BUE>BLE  CV:  - RRR on telemetry, NSR  RESP:  - intubated  - Oxygen: Intubated  Ventilator: 50% FiO2, 10 PEEP, RR 16  :  - Indwelling henriquez catheter in place  GI:  - Abdomen distended, soft  SKIN:  - Intact    MEDICATIONS:  Scheduled: PRN: Continuous:   Scheduled Medications[1] PRN Medications[2]  Continuous Medications[3]     LAB RESULTS:  Results from last 72 hours   Lab Units 04/17/25  1156 04/17/25  0601   GLUCOSE mg/dL 91 137*   SODIUM mmol/L 134* 136   POTASSIUM mmol/L 3.9 3.3*   CHLORIDE mmol/L 97* 98   CO2 mmol/L 28 29   ANION GAP mmol/L 13 12   BUN mg/dL 12 12   CREATININE mg/dL 1.33* 1.48*   EGFR mL/min/1.73m*2 60* 53*   CALCIUM mg/dL 8.1* 7.9*   PHOSPHORUS mg/dL 3.3 3.4   ALBUMIN g/dL 3.2* 3.2*   MAGNESIUM mg/dL 2.27 2.05   POCT CALCIUM IONIZED (MMOL/L) IN BLOOD mmol/L 0.99*  --       Results from last 72 hours   Lab Units 04/17/25  1156 04/17/25  0601   WBC AUTO x10*3/uL 4.0* 4.3*   NRBC AUTO /100 WBCs 0.0 0.0   RBC AUTO x10*6/uL 3.56* 3.54*   HEMOGLOBIN g/dL 10.6* 10.7*   HEMATOCRIT % 33.0* 33.1*   MCV fL 93 94   MCH pg 29.8 30.2   MCHC g/dL 32.1 32.3   RDW % 15.1* 15.0*   PLATELETS AUTO x10*3/uL 128* 117*   NEUTROS PCT AUTO % 72.2 68.7   IG PCT AUTO % 0.3 0.5   LYMPHS PCT AUTO % 18.4 20.2   MONOS PCT AUTO % 5.8 6.7   EOS PCT AUTO % 2.8 3.7   BASOS PCT AUTO % 0.5 0.2   NEUTROS ABS x10*3/uL 2.87 2.96   IG AUTO x10*3/uL 0.01 0.02   LYMPHS ABS AUTO x10*3/uL 0.73* 0.87*   MONOS ABS AUTO x10*3/uL 0.23 0.29   EOS ABS AUTO x10*3/uL 0.11 0.16   BASOS ABS AUTO x10*3/uL 0.02 0.01        IMAGING RESULTS:  XR chest 1 view  Result Date: 4/17/2025  Interpreted By:  Chantal Avila, STUDY: XR CHEST 1 VIEW;  4/17/2025 7:10 am   INDICATION: Signs/Symptoms:Intubated, left lower lobe pneumonia.   COMPARISON: 04/16/2025   ACCESSION NUMBER(S): [id number]   ORDERING CLINICIAN: DINESH MINA   FINDINGS: CARDIOMEDIASTINAL SILHOUETTE: The heart is enlarged but unchanged. There is an endotracheal tube with the tip 5.7 cm above the boston.     LUNGS: There is persistent bibasilar atelectasis or infiltrates. This is unchanged.   ABDOMEN: No remarkable upper abdominal findings.     BONES: No acute osseous changes. There is a left reverse total shoulder arthroplasty.       1. Satisfactory position endotracheal tube. 2.  Persistent bibasilar atelectasis or mild infiltrates.   MACRO: None   Signed by: Chantal Margarita 4/17/2025 9:03 AM Dictation workstation:   [id number]    Electrocardiogram, 12-lead PRN ACS symptoms  Result Date: 4/16/2025  Sinus tachycardia Ventricular premature complex Repol abnrm suggests ischemia, anterolateral See ED provider note for full interpretation and clinical correlation Confirmed by Chyna Hinkle ([id number]) on 4/16/2025 7:38:34 PM    CT head wo IV contrast  Result Date: 4/16/2025  Interpreted By:  Heather Marie, STUDY: CT HEAD WO IV CONTRAST;  4/16/2025 3:30 pm   INDICATION: Signs/Symptoms:Seizures, patient on heparin infusion.   Please evaluate for intracranial hemorrhage.     COMPARISON: 04/15/2025   ACCESSION NUMBER(S): [id number]   ORDERING CLINICIAN: EMY MENA   TECHNIQUE: Noncontrast axial CT scan of head was performed. Angled reformats in brain and bone windows were generated. The images were reviewed in bone, brain, blood and soft tissue windows.   FINDINGS: CSF Spaces: The ventricles, sulci and basal cisterns are within normal limits. There is no extraaxial fluid collection.   Parenchyma:  The grey-white differentiation is intact. There is no mass effect or midline shift.  There is no intracranial hemorrhage.   Calvarium: The calvarium is unremarkable.   Paranasal sinuses and mastoids: There is postoperative change from bilateral antrostomies with mucous membrane thickening in the maxillary sinuses bilaterally and with some mild mucous membrane thickening within right sphenoid sinus. There is also some mucous membrane thickening within the left sphenoid sinus with small mucous retention cyst or polyp within the right frontal sinus. 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       No acute intracranial process with normal appearance of the unenhanced brain.   Status post bilateral antrostomies with chronic maxillary and sphenoid sinusitis with small mucous retention  cyst or polyp in right frontal sinus.   Stable osteoma within right ethmoid sinus.   MACRO: None   Signed by: Heather Marie 4/16/2025 4:00 PM Dictation workstation:   [id number]    XR chest 1 view  Result Date: 4/16/2025  Interpreted By:  Alfredo Toney, STUDY: XR CHEST 1 VIEW  4/16/2025 2:21 pm   INDICATION: Signs/Symptoms:ETT placement verification   COMPARISON: 04/16/2025   ACCESSION NUMBER(S): [id number]   ORDERING CLINICIAN: RUPERT MONROE   TECHNIQUE: A single AP portable radiograph of the chest was obtained.   FINDINGS: Multiple cardiac monitoring leads are seen over the chest.  The endotracheal tube tip is now seen approximately 5.5 cm above the boston. An enteric tube is present, with the tip overlying the expected location of the distal esophagus. Hazy consolidations are seen over the lung bases bilaterally, and may represent atelectasis and/or pneumonia. No pneumothorax is identified. The cardiac silhouette is within normal limits for size.       1. Endotracheal and enteric tube placement, as above. 2. Bibasilar airspace opacities, as above. Clinical correlation and continued follow-up until clearing is recommended.   MACRO: None.   Signed by: Alfredo Toney 4/16/2025 3:28 PM Dictation workstation:   [id number]    ECG 12 lead  Result Date: 4/16/2025  Sinus tachycardia Multiple ventricular premature complexes Nonspecific T abnormalities, anterior leads Prolonged QT interval    ECG 12 lead  Result Date: 4/16/2025  Sinus tachycardia RBBB and LPFB    EEG  IMPRESSION Impression This routine EEG is indicative of a severe diffuse encephalopathy. No epileptiform discharges or lateralizing signs are seen. A full report will be scanned into the patient's chart at a later time. This report has been interpreted and electronically signed by    XR chest 1 view  Result Date: 4/16/2025  Interpreted By:  Javier Jhaveri, STUDY: XR CHEST 1 VIEW;  4/16/2025 7:39 am   INDICATION: Signs/Symptoms:Intubated, left lower lobe  pneumonia.   COMPARISON: 04/15/2025   ACCESSION NUMBER(S): [id number]   ORDERING CLINICIAN: DINESH MINA   FINDINGS: CARDIOMEDIASTINAL SILHOUETTE AND VASCULATURE:   Cardiac size:  Probable upper limits of normal considering a shallow inspiration. Aortic shadow:  Within normal limits.   Mediastinal contours: Within normal limits.   Pulmonary vasculature:  The central vasculature is unremarkable   LUNGS: Improvement of left infrahilar atelectasis. There has also been improvement and near resolution of prominent left bronchovascular and interstitial markings that was probably from congestion.   ABDOMEN AND OTHER FINDINGS: An NG catheter is present, but the distal aspect not well visualized due to underpenetration. An endotracheal tube again terminates at just below the thoracic inlet.   BONES: No acute osseous changes.       1.  Improvement of congestion and left infrahilar atelectasis or infiltrate.   Signed by: Javier Jhaveri 4/16/2025 8:40 AM Dictation workstation:   [id number]    Transthoracic Echo (TTE) Complete  Result Date: 4/15/2025   [facility], [address] [place] [address]           Tel [phone] and Fax [phone] TRANSTHORACIC ECHOCARDIOGRAM REPORT  Patient Name:       EMELIA GALVAN       Reading Physician:    [id number] Vargas Anderson MD Study Date:         4/15/2025           Ordering Provider:    [id number] DINESH MINA MRN/PID:            [id number]            Fellow: Accession#:         [id number]        Nurse: Date of Birth/Age:  1960 / 64 years Sonographer:          Vanna Curry RDCS Gender assigned at  M                   Additional Staff: Birth: Height:             180.34 cm           Admit Date:           4/15/2025 Weight:             153.32 kg           Admission Status:      Inpatient - STAT BSA / BMI:          2.64 m2 / 47.14     Encounter#:           [id number]                     kg/m2 Blood Pressure:     106/57 mmHg         Department Location:  [address]                                                               floor/ICU Study Type:    TRANSTHORACIC ECHO (TTE) COMPLETE Diagnosis/ICD: Ventricular tachycardia, unspecified-I47.20; Unspecified atrial                fibrillation-I48.91 Indication:    Seizure CPT Code:      Echo Complete w Full Doppler-[id number] Patient History: Pertinent History: HTN, A-Fib and Hyperlipidemia. ETOH Disorder, KAYODE. Study Detail: The following Echo studies were performed: 2D, M-Mode, Doppler and               color flow. Technically challenging study due to patient lying in               supine position and body habitus. The patient is intubated.               Definity used as a contrast agent for endocardial border               definition. Total contrast used for this procedure was 3 mL via IV               push. Unable to obtain subcostal and suprasternal notch view.  PHYSICIAN INTERPRETATION: Left Ventricle: Left ventricular ejection fraction is low normal, by visual estimate at 50-55%. There are no regional left ventricular wall motion abnormalities. The left ventricular cavity size is normal. There is mildly increased septal and mildly increased posterior left ventricular wall thickness. There is left ventricular concentric remodeling. Spectral Doppler shows a Grade I (impaired relaxation pattern) of left ventricular diastolic filling with normal left atrial filling pressure. Left Atrium: The left atrial size is normal. Right Ventricle: The right ventricle is normal in size. There is normal right ventricular global systolic function. Right Atrium: The right atrium is normal in size. Aortic Valve: The aortic valve is trileaflet. There is no evidence of aortic valve regurgitation. The peak instantaneous gradient of the aortic valve is 8 mmHg. Mitral  Valve: The mitral valve is normal in structure. There is trace mitral valve regurgitation. Tricuspid Valve: The tricuspid valve is structurally normal. There is trace tricuspid regurgitation. Pulmonic Valve: The pulmonic valve is structurally normal. There is trace pulmonic valve regurgitation. Pericardium: There is no pericardial effusion noted. Aorta: The aortic root is normal. There is moderate dilatation of the ascending aorta. There is mild dilatation of the aortic root.  CONCLUSIONS:  1. Left ventricular ejection fraction is low normal, by visual estimate at 50-55%.  2. Spectral Doppler shows a Grade I (impaired relaxation pattern) of left ventricular diastolic filling with normal left atrial filling pressure.  3. There is normal right ventricular global systolic function.  4. There is moderate dilatation of the ascending aorta. QUANTITATIVE DATA SUMMARY:  2D MEASUREMENTS:          Normal Ranges: Ao Root d:       3.60 cm  (2.0-3.7cm) LAs:             4.70 cm  (2.7-4.0cm) IVSd:            1.32 cm  (0.6-1.1cm) LVPWd:           1.23 cm  (0.6-1.1cm) LVIDd:           3.81 cm  (3.9-5.9cm) LVIDs:           2.68 cm LV Mass Index:   64 g/m2 LVEDV Index:     45 ml/m2 LV % FS          29.7 %  LEFT ATRIUM:                  Normal Ranges: LA Vol A4C:        51.8 ml    (22+/-6mL/m2) LA Vol A2C:        41.6 ml LA Vol BP:         52.6 ml LA Vol Index A4C:  19.7ml/m2 LA Vol Index A2C:  15.8 ml/m2 LA Vol Index BP:   20.0 ml/m2 LA Area A4C:       19.6 cm2 LA Area A2C:       19.9 cm2 LA Major Axis A4C: 6.3 cm LA Major Axis A2C: 8.1 cm LA Vol A4C:        50.6 ml LA Vol A2C:        44.9 ml LA Vol Index BSA:  18.1 ml/m2  RIGHT ATRIUM:          Normal Ranges: RA Area A4C:  14.0 cm2  M-MODE MEASUREMENTS:         Normal Ranges: Ao Root:             3.40 cm (2.0-3.7cm) LAs:                 4.60 cm (2.7-4.0cm)  AORTA MEASUREMENTS:         Normal Ranges: Asc Ao, d:          4.10 cm (2.1-3.4cm)  LV SYSTOLIC FUNCTION:                       Normal Ranges: EF-A4C View:    65 % (>=55%) EF-A2C View:    44 % EF-Biplane:     55 % EF-Visual:      53 % LV EF Reported: 53 %  LV DIASTOLIC FUNCTION:          Normal Ranges: MV medial e'           0.09 m/s  AORTIC VALVE:           Normal Ranges: AoV Vmax:      1.41 m/s (<=1.7m/s) AoV Peak P.0 mmHg (<20mmHg) LVOT Max Shine:  1.10 m/s (<=1.1m/s) LVOT VTI:      12.20 cm LVOT Diameter: 2.30 cm  (1.8-2.4cm) AoV Area,Vmax: 3.24 cm2 (2.5-4.5cm2)  RIGHT VENTRICLE: RV Basal 2.80 cm RV Mid   2.21 cm TAPSE:   21.3 mm RV s'    0.13 m/s  PULMONIC VALVE:            Normal Ranges: RVOT diam: systole 6.30 cm (1.7-2.3cm)  [id number] Vargas Anderson MD Electronically signed on 4/15/2025 at 9:43:47 AM  ** Final **     CT head wo IV contrast  Result Date: 4/15/2025  Interpreted By:  Che Jones, STUDY: CT HEAD WO IV CONTRAST;  4/15/2025 5:03 am   INDICATION: Signs/Symptoms:Concern of status epilepticus.   COMPARISON: 10/15/2024   ACCESSION NUMBER(S): [id number]   ORDERING CLINICIAN: TREE CORONA   TECHNIQUE: Axial noncontrast CT images of the head.   FINDINGS: BRAIN PARENCHYMA: Gray-white matter interfaces are preserved. No mass effect or midline shift. Generalized parenchymal volume loss noted with concordant ventricular enlargement. Non-specific white matter changes noted, which may be related to small vessel disease.   HEMORRHAGE: No acute intracranial hemorrhage. VENTRICLES and EXTRA-AXIAL SPACES: The ventricles, sulci and basal cisterns enlarged, concordant with parenchymal volume loss. EXTRACRANIAL SOFT TISSUES: Within normal limits. PARANASAL SINUSES/MASTOIDS: Mucosal thickening with scattered debris in the paranasal sinuses. Probable right posterior ethmoidal osteoma. Mastoid air cells are patent. CALVARIUM: No depressed skull fracture. No destructive osseous lesion.   OTHER FINDINGS: None.       No acute intracranial hemorrhage or mass effect.     MACRO: None.   Signed by: Che Jones 4/15/2025 6:07 AM  Dictation workstation:   [id number]    XR chest 1 view  Result Date: 4/15/2025  STUDY: Chest Radiograph, One View; 4/15/2025 at 4:45am INDICATION: Intubation. COMPARISON: None Available ACCESSION NUMBER(S): [id number] ORDERING CLINICIAN: TREE CORONA TECHNIQUE:  Frontal chest was obtained at 4:45 hours. FINDINGS: CARDIOMEDIASTINAL SILHOUETTE: Cardiomediastinal silhouette is normal in size and configuration. Enteric tube tip is poorly visualized distally in may terminate in the lower esophagus, indeterminate.  LUNGS: Endotracheal tube is adequately positioned. Increased interstitial markings are seen bilaterally.  There is diminished inspiration with suggestion of left infrahilar airspace opacity.  Right suprahilar airspace disease is also noted along the right side of the mediastinum. ABDOMEN: No remarkable upper abdominal findings.  BONES: No acute osseous changes.    Diminished inspiration with increased interstitial markings bilaterally, likely mild pulmonary vascular congestion. Right suprahilar and left infrahilar airspace opacities which may indicate atelectasis or pneumonia in the appropriate clinical setting. Indeterminate positioning of the enteric tube with the distal end poorly visualized overlying the lower mediastinum.  If needed upper abdominal radiograph could be considered to verify positioning. Signed by David Pepe      Assessment/Plan      NEURO:  #Epilepsy with breakthrough seizures iso alcohol withdrawal  #Alcohol withdrawal syndrome with delirium tremors  Assessment:  - Neurologically: see exam above  - 4/15: s/p 4.5g Keppra load  - 4/5: Keppra 37  - EEG 4/16: Severe encephalopathy, no seizures  - Home AEDs: Keppra 1500mg BID, Gabapentin 600mg QID  Plan:  - NSU  - Neuro Checks: Q1H  - Sedation: Propofol and Fentanyl  - cvEEG  - 1.5g Keppra BID  - CIWA,   - Folic acid, MVI, 500 mg IV thiamine TID x3 days then transition to PO thiamine 100 mg daily  - Phenobarbital taper, PRN valium  -  Seizure and fall precautions   - PT/OT/SLP    CARDIOVASCULAR:  #Atrial Fibrillation (on eliquis)  #HFpEF, HTN, HLD  Assessment:  - ECHO 4/15/25: EF 50-55%, moderate dilation of ascending aorta, LA normal, no PFO noted  Plan:  - Continue to monitor on telemetry  - BP goal: Normotension  - Hold eliquis, restart heparin gtt  - Monitor heparin assays    RESPIRATORY:  #Acute hypoxemic respiratory failure 2/2 PNA  #KAYODE  Assessment:  - Intubated, 50% FiO2, 10 PEEP, RR 16  Plan:  - Continuous pulse oximetry   - O2 PRN to maintain SpO2 > 94%, wean as tolerated  - Ventilator bundle while intubated, Daily CPAP and weaning parameters while intubated, and Wean to extubate  - See ID section    RENAL/:  #JARVIS- improving  Assessment:  - Baseline BUN/Cr: 8/0.70 (2024)  Results from last 72 hours   Lab Units 04/17/25  1156 04/17/25  0601   BUN mg/dL 12 12   CREATININE mg/dL 1.33* 1.48*       Plan:  - Monitor with daily RFP  - Maintain henriquez catheter for: critically ill patient who need accurate urinary output measurements    FEN/GI:  #No active issues  Assessment:  - Last BM: PTA  - NGT placed  Plan:  - Monitor and replace electrolytes per protocol  - Diet: NPO   - Bowel Regimen: Miralax QD    ENDOCRINE:  #Pre-DM  Assessment:  Results from last 7 days   Lab Units 04/17/25  1156 04/17/25  1141 04/17/25  1049 04/17/25  0601 04/16/25  0510 04/15/25  0743 04/15/25  0540 04/15/25  0054   POCT GLUCOSE mg/dL  --  103* 112*  --   --   --  200*  --    GLUCOSE mg/dL 91  --   --  137* 90 125*  --  139*   SODIUM mmol/L 134*  --   --  136 136 135*  --  134*      Plan:  - Accuchecks & ISS Q4H     HEMATOLOGY:  #Anemia  Assessment:  - Baseline Hgb: 13 (2024)  - Baseline Plts: 190 (2024)  Results from last 7 days   Lab Units 04/17/25  1156 04/17/25  0601 04/16/25  0510   HEMOGLOBIN g/dL 10.6* 10.7* 12.9*   HEMATOCRIT % 33.0* 33.1* 42.3   PLATELETS AUTO x10*3/uL 128* 117* 196     Plan:  - Continue to monitor with daily CBC and Coag panel  - Restart  heparin gtt, monitor heparin assays    INFECTIOUS DISEASE:  #Left lower lobe pneumonia - likely 2/2 aspiration   Assessment:  Results from last 7 days   Lab Units 25  1156 25  0601 25  0510   WBC AUTO x10*3/uL 4.0* 4.3* 7.8    - Temp (24hrs), Av.5 °C (97.7 °F), Min:35.4 °C (95.7 °F), Max:37.6 °C (99.7 °F)  - 4/15: Bcx NGTD  - 4/15: MRSA not detected  - : Resp cx shows Gram +  Plan:  - Continue to monitor for s/sx of infection  - Pan culture for temperature > 38.4 C  - Continue Zosyn (-), restart Vancomycin (-)    MUSCULOSKELETAL:  - No acute issues    SKIN:  - No acute issues  - Turns and skin care per NSU protocol    ACCESS:  - PIVs    PROPHYLAXIS:  - DVT Ppx: SCDs, plan to restart heparin gtt  - GI Ppx: Pantoprazole    RESTRAINTS:  I agree with nursing assessment of the patient's need for restraints to protect the patient from injury and facilitate healing. The patient is unable to cooperate with the plan of care and at risk for disrupting critical therapy (i.e., removing medical devices, lines, tubes and/or dressings).  Please see order for specifics. Restraints can be removed when the patient is able to cooperate with plan of care and allow healing to occur, or the medical devices at risk are discontinued by the medical team.     Tristen Dumont, APRN-CNP  Neuroscience Intensive Care       Total critical care time of 60 minutes, with > 50% of time spent in direct contact with patient/family for education, counseling and coordination of care.         [1] [START ON 2025] pantoprazole, 40 mg, oral, Daily before breakfast   Or  [START ON 2025] esomeprazole, 40 mg, nasoduodenal tube, Daily before breakfast   Or  [START ON 2025] pantoprazole, 40 mg, intravenous, Daily before breakfast  folic acid, 1 mg, oral, Daily  heparin, 4,000 Units, intravenous, Once  heparin, , ,   insulin lispro, 0-5 Units, subcutaneous, q4h  levETIRAcetam, 1,500 mg, nasogastric tube,  BID  multivitamin with minerals, 1 tablet, oral, Daily  PHENobarbital, 97.5 mg, intravenous, q8h   Followed by  [START ON 4/19/2025] PHENobarbital, 65 mg, intravenous, q8h   Followed by  [START ON 4/21/2025] PHENobarbital, 32.5 mg, intravenous, q8h  piperacillin-tazobactam, 4.5 g, intravenous, q6h  polyethylene glycol, 17 g, oral, Daily  [START ON 4/20/2025] thiamine, 100 mg, oral, Daily  thiamine, 500 mg, intravenous, q8h AURY     [2] PRN medications: dextrose, dextrose, glucagon, glucagon, glucagon, heparin, heparin, oxygen, PHENobarbital, sennosides-docusate sodium  [3] dexmedeTOMIDine, 0.1-1.5 mcg/kg/hr (Ideal), Last Rate: 0.1 mcg/kg/hr (04/17/25 1359)  fentaNYL,  mcg/hr, Last Rate: 50 mcg/hr (04/17/25 1015)  heparin, 0-4,000 Units/hr, Last Rate: 1,000 Units/hr (04/17/25 1324)  propofol, 0-50 mcg/kg/min, Last Rate: 20 mcg/kg/min (04/17/25 1402)

## 2025-04-17 NOTE — ASSESSMENT & PLAN NOTE
64 year old man with PMH of atrial Fibrillation (on Eliquis), HFpEF, epilepsy, HTN, DM type II, alcohol use disorder with acute hypoxemic respiratory failure, breakthrough seizures, left lower lobe pneumonia, alcohol withdrawal syndrome.     1) Acute hypoxemic respiratory failure secondary to pneumonia (gram positives) and acute alcohol withdrawal syndrome  --Continue with mechanical ventilation with lung protective strategy.  --Antibiotic regimen: Ceftriaxone, vancomycin, acyclovir,   --Cultures reviewed: Gram positives in sputum culture  --Spontaneous breathing trial: Not done    2) Alcohol withdrawal syndrome with delirium tremens  --Management with phenobabital and benzodiazepines +Propofol gtt  --Thiamine high dose for management of Wernicke encephalopathy    3) Epilepsy with breakthorugh seizures  --Management with levetiracetam, benzodiazepines and propofol  --Broadened antimicrobial spectrum to cover meningoencephalitis (ceftriaxone, vancomycin, acyclovir and ampicillin)  --CT head with no hemorrhage, normal appearance of brain.    4) JARVIS improving  --Etiology most likely hypovolemic, continueIV hydration     5) Type 2 AMI vs NSTEMI  --No focal motion abnormalities on echocardiogram  --Anticoagulation with aspirin    6) Atrial fibrillation   --Anticoagulation: heparin infusion held, needs to be restarted    7) Prolonged Qtc  --Avoid medications that prolong QTc    DVT prophylaxis - Enoxaparin SQ  Nutrition: Tube feedings not started, unable to insert OG  Vascular catheters:  Peripheral lines  Urinary catheter needed for strict input/output      Plan to transfer to [facility] as recommended by neurology for continuous EEG

## 2025-04-17 NOTE — PROGRESS NOTES
Elvis Masters is a 64 y.o. male on day 2 of admission presenting with Seizure (Multi).  Record reviewed.  Patient is planned for transfer to [facility] for continuous EEG monitoring recommended by Neurology.     04/17/25 0899   Discharge Planning   Expected Discharge Disposition Short Term A   Care Transitions will continue to follow.

## 2025-04-17 NOTE — CARE PLAN
Problem: Safety - Medical Restraint  Goal: Remains free of injury from restraints (Restraint for Interference with Medical Device)  Outcome: Progressing  Goal: Free from restraint(s) (Restraint for Interference with Medical Device)  Outcome: Progressing     Problem: Respiratory  Goal: Clear secretions with interventions this shift  Outcome: Progressing  Goal: Minimize anxiety/maximize coping throughout shift  Outcome: Progressing  Goal: Minimal/no exertional discomfort or dyspnea this shift  Outcome: Progressing  Goal: No signs of respiratory distress (eg. Use of accessory muscles. Peds grunting)  Outcome: Progressing  Goal: Patent airway maintained this shift  Outcome: Progressing  Goal: Tolerate mechanical ventilation evidenced by VS/agitation level this shift  Outcome: Progressing  Goal: Tolerate pulmonary toileting this shift  Outcome: Progressing  Goal: Verbalize decreased shortness of breath this shift  Outcome: Progressing  Goal: Wean oxygen to maintain O2 saturation per order/standard this shift  Outcome: Progressing  Goal: Increase self care and/or family involvement in next 24 hours  Outcome: Progressing     Problem: Pain - Adult  Goal: Verbalizes/displays adequate comfort level or baseline comfort level  Outcome: Progressing     Problem: Safety - Adult  Goal: Free from fall injury  Outcome: Progressing     Problem: Discharge Planning  Goal: Discharge to home or other facility with appropriate resources  Outcome: Progressing     Problem: Chronic Conditions and Co-morbidities  Goal: Patient's chronic conditions and co-morbidity symptoms are monitored and maintained or improved  Outcome: Progressing     Problem: Nutrition  Goal: Nutrient intake appropriate for maintaining nutritional needs  Outcome: Progressing     Problem: Fall/Injury  Goal: Not fall by end of shift  Outcome: Progressing  Goal: Be free from injury by end of the shift  Outcome: Progressing  Goal: Verbalize understanding of personal risk  factors for fall in the hospital  Outcome: Progressing  Goal: Verbalize understanding of risk factor reduction measures to prevent injury from fall in the home  Outcome: Progressing  Goal: Use assistive devices by end of the shift  Outcome: Progressing  Goal: Pace activities to prevent fatigue by end of the shift  Outcome: Progressing     Problem: Skin  Goal: Decreased wound size/increased tissue granulation at next dressing change  Outcome: Progressing  Flowsheets (Taken 4/17/2025 0414)  Decreased wound size/increased tissue granulation at next dressing change: Promote sleep for wound healing  Goal: Participates in plan/prevention/treatment measures  Outcome: Progressing  Flowsheets (Taken 4/17/2025 0414)  Participates in plan/prevention/treatment measures: Elevate heels  Goal: Prevent/manage excess moisture  Outcome: Progressing  Flowsheets (Taken 4/17/2025 0414)  Prevent/manage excess moisture: Cleanse incontinence/protect with barrier cream  Goal: Prevent/minimize sheer/friction injuries  Outcome: Progressing  Flowsheets (Taken 4/17/2025 0414)  Prevent/minimize sheer/friction injuries:   HOB 30 degrees or less   Turn/reposition every 2 hours/use positioning/transfer devices  Goal: Promote/optimize nutrition  Outcome: Progressing  Flowsheets (Taken 4/17/2025 0414)  Promote/optimize nutrition: Monitor/record intake including meals  Goal: Promote skin healing  Outcome: Progressing  Flowsheets (Taken 4/17/2025 0414)  Promote skin healing:   Assess skin/pad under line(s)/device(s)   Turn/reposition every 2 hours/use positioning/transfer devices     Problem: Knowledge Deficit  Goal: Patient/family/caregiver demonstrates understanding of disease process, treatment plan, medications, and discharge instructions  Outcome: Progressing     Problem: Mechanical Ventilation  Goal: Patient Will Maintain Patent Airway  Outcome: Progressing  Goal: Oral health is maintained or improved  Outcome: Progressing  Goal: Tracheostomy  will be managed safely  Outcome: Progressing  Goal: ET tube will be managed safely  Outcome: Progressing  Goal: Ability to express needs and understand communication  Outcome: Progressing  Goal: Mobility/activity is maintained at optimum level for patient  Outcome: Progressing

## 2025-04-17 NOTE — NURSING NOTE
Pt being transferred to [facility] for continuous EEG monitoring. Report called. ETA [id number].

## 2025-04-17 NOTE — CONSULTS
"Nutrition Note:   Nutrition Assessment    Reason for Assessment: Admission nursing screening    Patient is a 64 y.o. male initially presented to [facility] on 4/15 due to a GTC per mother that occurred at home and lasted roughly 10 minutes. He transferred to [facility] for uncontrollable seizures in setting ETOH withdrawal and epilepsy.     Large bore NGT remains in place    PMH of atrial Fibrillation (on Eliquis), HFpEF, epilepsy, HTN, DM type II, alcohol use disorder       Nutrition History:  Food and Nutrient History: Pt intuabted this AM; however, now intubated on HFNC. Per chart review, it is documented that he drinks half a gallon of ETOH a day. Pt's mother and father at bedside. Noted that he drinks \"a lot\" of alcohol - mainly vodka, but sometimes beer. Mentioned that he also drinks \"a lot\" of juice and soda. Explained that they sit for meal times, but he does not always come to eat meals - will skip frequently due to his alcohol intake. She is unsure what exactly he is taking in as far as food.       Anthropometrics:  Height: 187 cm (6' 1.62\")   Weight: (!) 160 kg (351 lb 10.1 oz)   BMI (Calculated): 45.61  IBW/kg (Dietitian Calculated): 86.4 kg  Percent of IBW: 184 %  Adjusted Body Weight (kg): 105 kg    Weight History:   Wt Readings from Last 15 Encounters:   04/17/25 (!) 160 kg (351 lb 10.1 oz)   04/17/25 (!) 159 kg (351 lb 6.6 oz)   08/24/24 (!) 154 kg (340 lb)     Weight Change %:  Significant Weight Loss: No    Nutrition Focused Physical Exam Findings:  Limited - visual  Subcutaneous Fat Loss:   Orbital Fat Pads: Well nourished (slightly bulging fat pads)  Buccal Fat Pads: Defer  Triceps: Well nourished (ample fat tissue)  Ribs: Defer  Muscle Wasting:  Temporalis: Well nourished (well-defined muscle)  Pectoralis (Clavicular Region): Well nourished (clavicle not visible)  Deltoid/Trapezius: Well nourished (rounded appearance at arm, shoulder, neck)  Interosseous: Well nourished (muscle bulges)    Nutrition " Significant Labs:  CBC Trend:   Results from last 7 days   Lab Units 04/17/25  1156 04/17/25  0601 04/16/25  0510 04/15/25  0054   WBC AUTO x10*3/uL 4.0* 4.3* 7.8 5.7   RBC AUTO x10*6/uL 3.56* 3.54* 4.40* 4.61   HEMOGLOBIN g/dL 10.6* 10.7* 12.9* 13.6   HEMATOCRIT % 33.0* 33.1* 42.3 41.9   MCV fL 93 94 96 91   PLATELETS AUTO x10*3/uL 128* 117* 196 213    , BMP Trend:   Results from last 7 days   Lab Units 04/17/25  1156 04/17/25  0601 04/16/25  0510 04/15/25  0743   GLUCOSE mg/dL 91 137* 90 125*   CALCIUM mg/dL 8.1* 7.9* 8.6 8.5*   SODIUM mmol/L 134* 136 136 135*   POTASSIUM mmol/L 3.9 3.3* 3.4* 3.6   CO2 mmol/L 28 29 25 26   CHLORIDE mmol/L 97* 98 94* 95*   BUN mg/dL 12 12 12 7   CREATININE mg/dL 1.33* 1.48* 2.68* 1.23    , A1C:  Lab Results   Component Value Date    HGBA1C 5.7 (H) 10/21/2024   , BG POCT trend:   Results from last 7 days   Lab Units 04/17/25  1141 04/17/25  1049 04/15/25  0540   POCT GLUCOSE mg/dL 103* 112* 200*    , Renal Lab Trend:   Results from last 7 days   Lab Units 04/17/25  1156 04/17/25  0601 04/16/25  0510 04/15/25  0743 04/15/25  0743   POTASSIUM mmol/L 3.9 3.3* 3.4*  --  3.6   PHOSPHORUS mg/dL 3.3 3.4 6.3*   < >  --    SODIUM mmol/L 134* 136 136  --  135*   MAGNESIUM mg/dL 2.27 2.05 2.41*  --  1.73   EGFR mL/min/1.73m*2 60* 53* 26*  --  66   BUN mg/dL 12 12 12  --  7   CREATININE mg/dL 1.33* 1.48* 2.68*  --  1.23    < > = values in this interval not displayed.        Nutrition Specific Medications:  Scheduled medications  [START ON 4/18/2025] pantoprazole, 40 mg, oral, Daily before breakfast   Or  [START ON 4/18/2025] esomeprazole, 40 mg, nasoduodenal tube, Daily before breakfast   Or  [START ON 4/18/2025] pantoprazole, 40 mg, intravenous, Daily before breakfast  folic acid, 1 mg, oral, Daily  insulin lispro, 0-5 Units, subcutaneous, q4h  multivitamin with minerals, 1 tablet, oral, Daily  PHENobarbital, 97.5 mg, intravenous, q8h   Followed by  [START ON 4/19/2025] PHENobarbital, 65 mg,  intravenous, q8h   Followed by  [START ON 4/21/2025] PHENobarbital, 32.5 mg, intravenous, q8h  piperacillin-tazobactam, 4.5 g, intravenous, q6h  polyethylene glycol, 17 g, oral, Daily  [START ON 4/20/2025] thiamine, 100 mg, oral, Daily  thiamine, 500 mg, intravenous, q8h AURY    Dietary Orders (From admission, onward)       Start     Ordered    04/17/25 1146  NPO Diet; Effective now  Diet effective now         04/17/25 1145    04/17/25 1127  May Not Participate in Room Service  ( ROOM SERVICE MAY NOT PARTICIPATE)  Once        Question:  .  Answer:  Yes    04/17/25 1127         Estimated Needs:   Total Energy Estimated Needs in 24 hours (kCal):  (0838-3797)  Method for Estimating Needs: ~25kcal/kg per IBW  Total Protein Estimated Needs in 24 Hours (g): 115 g  Method for Estimating 24 Hour Protein Needs: 1.3g/kg per IBW  Total Fluid Estimated Needs in 24 Hours (mL):  (per team)        Nutrition Diagnosis   Malnutrition Diagnosis  Patient has Malnutrition Diagnosis: No    Nutrition Diagnosis  Patient has Nutrition Diagnosis: Yes  Diagnosis Status (1): New  Nutrition Diagnosis 1: Predicted inadequate nutrient intake  Related to (1): ETOH abuse  As Evidenced by (1): diet recall - noted choosing ETOH over meals  Additional Assessment Information (1): Question if the majority of calories are coming from juice, soda, and alcohol. Unclear exact protein and overall nutrition intake; however, it is suspected to be inadequate       Nutrition Interventions/Recommendations   Nutrition prescription for oral nutrition, Nutrition prescription for enteral nutrition    Nutrition Recommendations:  If passes swallow eval, diet consistency per SLP.     If not cleared for SLP and plan for EN (replace current NGT with small-bore Cortrak tube), then please provide the following:   Start Glucerna 1.5 @ 10ml/hr, increase by 10mls every 12hrs to reach goal of 60ml/hr.   Additional water flushes per team   TF provides 1092mls free H2O.      Agree with current high dose thiamine regimen followed by 100mg daily.       Nutrition Monitoring and Evaluation   Food/Nutrient Related History Monitoring  Additional Plans: Route of nutrition    Time Spent (min): 60 minutes

## 2025-04-17 NOTE — H&P
History Of Present Illness  Elvis Masters is a 64 y.o. left handed male with PMHx significant for alcohol use disorder, persistent a-fib (on eliquis), seizure disorder (on keppra, unknown etiology), KAYODE (noncompliant with CPAP), HFpEF, HTN, HLD, pre-DM, who presented to [facility] on 4/15 due to a GTC per mother that occurred at home and lasted roughly 10 minutes. He is being transferred to [facility] for uncontrollable seizures iso Alcohol withdrawal and epilepsy.     Hospital course at [place]:   4/15: Patient was postictal throughout transport to the ED but was A&O x 2 on arrival and stated his last drink was >36 hours ago. He is non-compliant with keppra. He then had a second seizure around 3 am 4/15 and was given IV ativan 4 mg. Patient was extremely agitated and aggressive after this, requiring additional 2 mg of IV ativan, 50 mg of IV benadryl, and 5 mg of haldol. Patient was then intubated due to his prolonged post-ictal state, agitation, and concern for status. CT head was unremarkable. Labs showed normal Cr 0.6, BUN 5, Na 134, hypokalemia to 3.1, normal AST/ALT, lactate 7.6>2.7>2, normal ethanol level, keppra 37, negative UA, Utox + THC, normal blood cx. CMP ~6 hrs later showed Cr 1.23, BUN 7. Resp cx gram + mixed bacteria.     Patient had another episode of seizure-like activity en route to the ICU and was given an additional 2 mg of IV ativan. Loaded with keppra 4.5 g. Phenobarbital taper started for withdrawal. Started on fentanyl and propofol. He was also noted to be febrile to 38.8, tachycardic and tachypneic. Found to have a LLL pneumonia on CXR and started on empiric antibiotics with vancomycin and zosyn. TTE showed EF 50-55%, impaired diastolic filling. Started on heparin ggt w/ home eliquis held.     Neurology was consulted. Noted keppra level to be 37 on admission which indicates compliance. Recommended weaning off sedation to assess neurologic exam, and to transfer for cvEEG if unable to be weaned. Cont'd  "home keppra 750 mg BID. With sedation held later in the day, the patient became very restless and agitated, requiring resumption of sedation.     4/16: Patient admitted to MICU. Overnight he was stable. Lab abnormalities include elevated Cr 2.68, BUN 12, Phos 6.3, and mag 2.41. Propofol was weaned off to attempt to switch to precedex and within 5 minutes he had episode of agitation (patient was out-of-control, kicking his legs in the air/out of bed, pulling against his bilateral wrist restraints, biting/gagging against his endotracheal tube, and mumbling) and then developed seizure-like activity with pupils fixed, head-bobbing, and leg shaking. This behavior continued for approximately 2 minutes before terminating s/p administration of Ativan 2 mg IV push. CAP coverage broadened to ceftriaxone, vanc, and ampicillin. EEG while on prop, fent, and precedex showed severe diffuse encephalopathy.     In hospital AEDs:   4.5g Keppra load, IV 750mg Keppra BID, IV phenobarb, IV precedex, IV fentanyl, IV propofol    EEG 4/16/25: Findings are suggestive of severe encephalopathy without any seizure activity identified.     Disease history:   Years of alcoholism. He began having seizures prior to 2013. Episodes of panicky and out of body feeling years prior, then developed convulsions. Not all, but some in setting of withdraw. In 2017 he had an EEG suggestive of frontal lobe epilepsy. He started keppra. The episodes of panic have become far less frequent and he continued to have convulsions periodically.     Seen by Neurology at [facility] on 6/26/24:  Interval History since last seen 8/2022  \"no seizures. no side effects. compliant with meds. he has been in and out of treatment for alcohol. in [facility] - he has been out a week. - continued on Keppra 1500mg BID\"    SEIZURE TYPES  Seizure Type A:   Aura: none  Seizure Description: feels very tense, feels somewhat out of body and feels panic. Events occurring clusters. "   Duration: 10 minutes, but would reoccur in clusters  Frequency: in the past quite often, but not many in recent years  Last one: unknown    Seizure Type B:   Aura: none  Seizure Description: tonic clonic seizure  Duration: minutes   Frequency: maybe 2 in a year   Last one: September 2020     Previous work-up:  MRI HEAD EPILEPSY W/O 07/24/2024   IMPRESSION:   Unchanged nonspecific signal abnormality in the ventral left lingual gyrus and left fusiform gyrus which may represent focal cortical dysplasia or sequelae of remote insult. Low-grade glioma could yield a similar appearance. Correlation with seizure semiology and EEG is recommended.     Routine EEG  7/17/2024  Start Time: 12:04 pm - End Time:  1:07 pm   This Routine awake and drowsy EEG.  performed on a patient in the awake andalert state, was NORMAL.      Current antiepileptic drugs:   Keppra 1500mg twice daily   Also takes gabapentin 600 four times daily (from Dr. Lovett for pain)    Side-effects with current antiepileptic drugs:   None    Prior antiepileptic drugs: (Side effects, reason discontinued)   Topamax for weight loss     Epilepsy Risk factors: L occipito-temporal lobe FCD, Alcholism    Past Medical History  Medical History[1]  Surgical History  AMPUTATION, TOE 10/14   accident   ARTHROPLASTY, TOTAL SHOULDER Left 2/19/2019   Procedure: ARTHROPLASTY, SHOULDER left, rtsa ; Surgeon: Renny Duncan MD; Location: PERIOPERATIVE SERVICES; Service: Hand   COLONOSCOPY N/A 4/11/2017   Procedure: COLONOSCOPY, GENERAL ANESTHESIA; Surgeon: Karen Messer MD; Location: [facility]; Service: Gastroenterology   COLONOSCOPY AND ESOPHAGOGASTRODUODENOSCOPY N/A 2/23/2018   Procedure: ESOPHAGOGASTRODUODENOSCOPY AND COLONOSCOPY; Surgeon: Karen Messer MD; Location: [facility]; Service: Gastroenterology   INJ TRANSFORAMINAL,CERV/THOR Right 7/27/2015   Procedure: INJECTION, TRANSFORAMINAL, LUMBAR l4/5 and S1 on the painful side  "right? May do other levels; Surgeon: Sesar Lovett MD; [facility]; Service: Neurosurgery   INJ TRANSFORAMINAL,CERV/THOR Right 12/14/2015   Procedure: INJECTION, TRANSFORAMINAL, LUMBAR Right L2/3; Surgeon: Sesar Lovett MD; [facility]; Service: Neurosurgery   INJECTION, EPIDURAL, STEROID, LUMBAR 06/17/2015   [facility]   REPAIR, ROTATOR CUFF Left       Social History  Alcohol: half gallon of alcohol per day  Smoking Status Never   Passive exposure: Never   Smokeless Tobacco Former   Types: Chew     Allergies  Lisinopril  Allergen Reactions   Lisinopril Swelling   Facial swelling  angioedema   Bee Venom Anaphylactic Shock   Pt states allergic to yellow jacket bees.   Maple pollen- congestion     Review of Systems  Neurological Exam  Physical Exam  Intubated and ventilated  Sedated with prop 25 and fent 50  EEG leads being placed at time of exam    Eyes closed to nox stim  Not arousing to voice or nox stim  Not following any commands  PERRL, 4->2mm bilaterally  Eyes midline  Face appears symmetric, but limited by ETT    No WD to nox stim in all 4 extremities, limited exam due to sedation and EEG leads being placed         Last Recorded Vitals  Blood pressure 103/68, pulse 54, temperature 36.2 °C (97.2 °F), temperature source Temporal, resp. rate 16, height 1.87 m (6' 1.62\"), weight (!) 160 kg (351 lb 10.1 oz), SpO2 95%.    Relevant Results  Results from last 72 hours   Lab Units 04/17/25  0601 04/16/25  0510 04/15/25  0054   WBC AUTO x10*3/uL 4.3* 7.8 5.7   HEMOGLOBIN g/dL 10.7* 12.9* 13.6   HEMATOCRIT % 33.1* 42.3 41.9   PLATELETS AUTO x10*3/uL 117* 196 213        Results from last 72 hours   Lab Units 04/17/25  0601 04/16/25  0510 04/15/25  0743   SODIUM mmol/L 136 136 135*   POTASSIUM mmol/L 3.3* 3.4* 3.6   CHLORIDE mmol/L 98 94* 95*   CO2 mmol/L 29 25 26   BUN mg/dL 12 12 7   CREATININE mg/dL 1.48* 2.68* 1.23   MAGNESIUM mg/dL 2.05 2.41* 1.73   PHOSPHORUS mg/dL " 3.4 6.3*  --        I have personally reviewed the following imaging results:    Imaging  XR chest 1 view  Result Date: 4/17/2025  1. Satisfactory position endotracheal tube. 2. Persistent bibasilar atelectasis or mild infiltrates.   MACRO: None   Signed by: Chantal Avila 4/17/2025 9:03 AM Dictation workstation:   [id number]    CT head wo IV contrast  Result Date: 4/16/2025  No acute intracranial process with normal appearance of the unenhanced brain.   Status post bilateral antrostomies with chronic maxillary and sphenoid sinusitis with small mucous retention cyst or polyp in right frontal sinus.   Stable osteoma within right ethmoid sinus.   MACRO: None   Signed by: Heather Marie 4/16/2025 4:00 PM Dictation workstation:   [id number]    XR chest 1 view  Result Date: 4/16/2025  1. Endotracheal and enteric tube placement, as above. 2. Bibasilar airspace opacities, as above. Clinical correlation and continued follow-up until clearing is recommended.   MACRO: None.   Signed by: Alfredo Toney 4/16/2025 3:28 PM Dictation workstation:   [id number]    EEG  Result Date: 4/16/2025  IMPRESSION Impression This routine EEG is indicative of a severe diffuse encephalopathy. No epileptiform discharges or lateralizing signs are seen. A full report will be scanned into the patient's chart at a later time. This report has been interpreted and electronically signed by    XR chest 1 view  Result Date: 4/16/2025  1.  Improvement of congestion and left infrahilar atelectasis or infiltrate.   Signed by: Javier Jhaveri 4/16/2025 8:40 AM Dictation workstation:   [id number]    CT head wo IV contrast  Result Date: 4/15/2025  No acute intracranial hemorrhage or mass effect.     MACRO: None.   Signed by: Che Jones 4/15/2025 6:07 AM Dictation workstation:   [id number]    XR chest 1 view  Result Date: 4/15/2025  Diminished inspiration with increased interstitial markings bilaterally, likely mild pulmonary vascular congestion. Right  suprahilar and left infrahilar airspace opacities which may indicate atelectasis or pneumonia in the appropriate clinical setting. Indeterminate positioning of the enteric tube with the distal end poorly visualized overlying the lower mediastinum.  If needed upper abdominal radiograph could be considered to verify positioning. Signed by David Pepe         Assessment & Plan  Seizure (Multi)    Elvis Masters is a 64 y.o. L handed  male with PMH significant for Alcohol use disorder, persistent A-fib (Eliquis), epilepsy,  KAYODE (noncompliant with CPAP), HFpEF, HTN, HLD, pre-DM, who presents as a transfer from  [facility] for uncontrollable seizures iso Alcohol withdrawal and epilepsy. Patient is currently intubated and sedated, will monitor with EEG and treat for alcohol withdrawal.      Assessment/Plan   NEURO:  #Breakthrough sz iso alcohol withdrawal  #Hx of Epilepsy (FCD on L occipitotemoral lobe)  #Delirium tremons>48hrs form last drink  Assessment:  :: EEG 4/16: Severe encephalopathy, no seizures  ::Home AEDs: Keppra 1500mg BID, Gabapentin 600mg QID  - 4/15 Keppra Level 36  Plan:  - NSU  - Neuro Checks: Q1H  - Sedation: IV propofol  - Pain: IV Fentanyl  - c/w cvEEG - wean propofol as tolerated  - Will continue with IV Keppra 750mg BID  - IV phenobarb - CIWA protocol  - IV thiamine 500mg TID x 3 days, followed by 100mg daily  - Multivitamin  - Seizure and fall precautions  - PT/OT/SLP    CARDIOVASCULAR:  #persistent Afib  #HFpEF  #HTN  #HLD  Assessment:  - On home ellliquis  - ECHO: 4/15: EF 50-55%    Plan:  - Continue to monitor on telemetry  - BP goal: Normotension   - Hold home elliquis, start patient on heparin drip    RESPIRATORY:  #KAYODE  #intubated iso frequent seizures  Assessment:  - Intubated: Will try to wean as tolerated  Plan:  - Continuous pulse oximetry   - O2 PRN to maintain SpO2 > 94%, wean as tolerated  - Ventilator bundle while intubated    RENAL/:  #JARVIS  Assessment:  - Baseline BUN/Cr:  7/1.23  Results from last 72 hours   Lab Units 25  0601 25  0510   BUN mg/dL 12 12   CREATININE mg/dL 1.48* 2.68*       Plan:  - Monitor with daily RFP  - Maintain external urinary diversion device for strict I&O    FEN/GI:  #NPO  Assessment:  -  Will place NGT for nutrition  Plan:  - Monitor and replace electrolytes per protocol  - IVF: NS @ 75 mL/hr  - Diet: NPO   - Bowel Regimen: Docusate-Senna PRN    ENDOCRINE:  #pre-DM  Assessment:  Results from last 7 days   Lab Units 25  1141 25  1049 25  0601 25  0510 04/15/25  0743 04/15/25  0540   POCT GLUCOSE mg/dL 103* 112*  --   --   --  200*   GLUCOSE mg/dL  --   --  137* 90   < >  --     < > = values in this interval not displayed.      Plan:  - Accuchecks & ISS AC&HS     HEMATOLOGY:  #On heparin drip for Afib  Assessment:  - Baseline Hgb: 13.6  - Baseline Plts: 213  Results from last 7 days   Lab Units 25  0601 25  0510   HEMOGLOBIN g/dL 10.7* 12.9*   HEMATOCRIT % 33.1* 42.3   PLATELETS AUTO x10*3/uL 117* 196     Plan:  - Continue to monitor with daily CBC and Coag panel    INFECTIOUS DISEASE:  #Left lower lobe pneumonia - likely 2/2 aspiration  Assessment:  Results from last 7 days   Lab Units 25  0601 25  0510   WBC AUTO x10*3/uL 4.3* 7.8    - Temp (24hrs), Av.6 °C (97.8 °F), Min:35.4 °C (95.7 °F), Max:37.6 °C (99.7 °F)   ::Bcx x2  neg, MRSA neg  ::Resp culture: gram positive bacteria  - Low concern for CNS infection  Plan:  - Will discontinue ampicillin, acyclovir (zosyn and ceftriaxone dced)  - Continue IV unasyn for LLE pneumonia with gram positive bacteria--> pending sensitivities - 7 day course ( 4/15 -)  - Continue to monitor for s/sx of infection  - Pan culture for temperature > 38.4 C    MUSCULOSKELETAL:  - No acute issues    SKIN:  - No acute issues  - Turns and skin care per NSU protocol    ACCESS:  - PIVs    PROPHYLAXIS:  - DVT Ppx: SCDs, heparin drip  - Gi ppx: None  indicated    RESTRAINTS:  I agree with nursing assessment of the patient's need for restraints to protect the patient from injury and facilitate healing. The patient is unable to cooperate with the plan of care and at risk for disrupting critical therapy (i.e., removing medical devices, lines, tubes and/or dressings).  Please see order for specifics. Restraints can be removed when the patient is able to cooperate with plan of care and allow healing to occur, or the medical devices at risk are discontinued by the medical team.     Katya Heredia MD  Neurology PGY-3       [1]   Past Medical History:  Diagnosis Date    Hypertension

## 2025-04-17 NOTE — CARE PLAN
Problem: Safety - Medical Restraint  Goal: Remains free of injury from restraints (Restraint for Interference with Medical Device)  4/17/2025 0415 by Markie Coello RN  Outcome: Progressing  Flowsheets (Taken 4/17/2025 0415)  Remains free of injury from restraints (restraint for interference with medical device):   Every 2 hours: Monitor safety, psychosocial status, comfort, nutrition and hydration   Determine that other, less restrictive measures have been tried or would not be effective before applying the restraint  4/17/2025 0414 by Markie Coello RN  Outcome: Progressing  Goal: Free from restraint(s) (Restraint for Interference with Medical Device)  4/17/2025 0415 by Markie Coello RN  Outcome: Progressing  Flowsheets (Taken 4/17/2025 0415)  Free from restraint(s) (restraint for interference with medical device): ONCE/SHIFT or MINIMUM Every 12 hours: Assess and document the continuing need for restraints  4/17/2025 0414 by Markie Coello RN  Outcome: Progressing     Problem: Respiratory  Goal: Clear secretions with interventions this shift  4/17/2025 0415 by Markie Coello RN  Outcome: Progressing  4/17/2025 0414 by Markie Coello RN  Outcome: Progressing  Goal: Minimize anxiety/maximize coping throughout shift  4/17/2025 0415 by Markie Coello RN  Outcome: Progressing  4/17/2025 0414 by Markie Coello RN  Outcome: Progressing  Goal: Minimal/no exertional discomfort or dyspnea this shift  4/17/2025 0415 by Markie Coello RN  Outcome: Progressing  4/17/2025 0414 by Markie Coello RN  Outcome: Progressing  Goal: No signs of respiratory distress (eg. Use of accessory muscles. Peds grunting)  4/17/2025 0415 by Markie Coello RN  Outcome: Progressing  4/17/2025 0414 by Markie Coello RN  Outcome: Progressing  Goal: Patent airway maintained this shift  4/17/2025 0415 by Markie Coello RN  Outcome: Progressing  4/17/2025 0414 by Markie Coello RN  Outcome: Progressing  Goal: Tolerate mechanical ventilation evidenced by  VS/agitation level this shift  4/17/2025 0415 by Markie Coello RN  Outcome: Progressing  4/17/2025 0414 by Markie Coello RN  Outcome: Progressing  Goal: Tolerate pulmonary toileting this shift  4/17/2025 0415 by Markie Coello RN  Outcome: Progressing  4/17/2025 0414 by Markie Coello RN  Outcome: Progressing  Goal: Verbalize decreased shortness of breath this shift  4/17/2025 0415 by Markie Coello RN  Outcome: Progressing  4/17/2025 0414 by Markie Coello RN  Outcome: Progressing  Goal: Wean oxygen to maintain O2 saturation per order/standard this shift  4/17/2025 0415 by Markie Coello RN  Outcome: Progressing  4/17/2025 0414 by Markie Coello RN  Outcome: Progressing  Goal: Increase self care and/or family involvement in next 24 hours  4/17/2025 0415 by Markie Coello RN  Outcome: Progressing  4/17/2025 0414 by Markie Coello RN  Outcome: Progressing     Problem: Pain - Adult  Goal: Verbalizes/displays adequate comfort level or baseline comfort level  4/17/2025 0415 by Markie Coello RN  Outcome: Progressing  4/17/2025 0414 by Markie Coello RN  Outcome: Progressing     Problem: Safety - Adult  Goal: Free from fall injury  4/17/2025 0415 by Markie Coello RN  Outcome: Progressing  4/17/2025 0414 by Markie Coello RN  Outcome: Progressing     Problem: Discharge Planning  Goal: Discharge to home or other facility with appropriate resources  4/17/2025 0415 by Markie Coello RN  Outcome: Progressing  4/17/2025 0414 by Markie Coello RN  Outcome: Progressing     Problem: Chronic Conditions and Co-morbidities  Goal: Patient's chronic conditions and co-morbidity symptoms are monitored and maintained or improved  4/17/2025 0415 by Markie Coello RN  Outcome: Progressing  4/17/2025 0414 by Markie Coello RN  Outcome: Progressing     Problem: Nutrition  Goal: Nutrient intake appropriate for maintaining nutritional needs  4/17/2025 0415 by Markie Coello RN  Outcome: Progressing  4/17/2025 0414 by Markie Coello RN  Outcome: Progressing      Problem: Fall/Injury  Goal: Not fall by end of shift  4/17/2025 0415 by Markie Coello RN  Outcome: Progressing  4/17/2025 0414 by Markie Coello RN  Outcome: Progressing  Goal: Be free from injury by end of the shift  4/17/2025 0415 by Markie Coello RN  Outcome: Progressing  4/17/2025 0414 by Markie Coello RN  Outcome: Progressing  Goal: Verbalize understanding of personal risk factors for fall in the hospital  4/17/2025 0415 by Markie Coello RN  Outcome: Progressing  4/17/2025 0414 by Markie Coello RN  Outcome: Progressing  Goal: Verbalize understanding of risk factor reduction measures to prevent injury from fall in the home  4/17/2025 0415 by Markie Coello RN  Outcome: Progressing  4/17/2025 0414 by Markie Coello RN  Outcome: Progressing  Goal: Use assistive devices by end of the shift  4/17/2025 0415 by Markie Coello RN  Outcome: Progressing  4/17/2025 0414 by Markie Coello RN  Outcome: Progressing  Goal: Pace activities to prevent fatigue by end of the shift  4/17/2025 0415 by Markie Coello RN  Outcome: Progressing  4/17/2025 0414 by Markie Coello RN  Outcome: Progressing     Problem: Skin  Goal: Decreased wound size/increased tissue granulation at next dressing change  4/17/2025 0415 by Markie Coello RN  Outcome: Progressing  Flowsheets (Taken 4/17/2025 0415)  Decreased wound size/increased tissue granulation at next dressing change: Promote sleep for wound healing  4/17/2025 0414 by Markie Coello RN  Outcome: Progressing  Flowsheets (Taken 4/17/2025 0414)  Decreased wound size/increased tissue granulation at next dressing change: Promote sleep for wound healing  Goal: Participates in plan/prevention/treatment measures  4/17/2025 0415 by Markie Coello RN  Outcome: Progressing  Flowsheets (Taken 4/17/2025 0415)  Participates in plan/prevention/treatment measures: Elevate heels  4/17/2025 0414 by Markie Coello RN  Outcome: Progressing  Flowsheets (Taken 4/17/2025 0414)  Participates in plan/prevention/treatment  measures: Elevate heels  Goal: Prevent/manage excess moisture  4/17/2025 0415 by Markie Coello RN  Outcome: Progressing  Flowsheets (Taken 4/17/2025 0415)  Prevent/manage excess moisture: Cleanse incontinence/protect with barrier cream  4/17/2025 0414 by Markie Coello RN  Outcome: Progressing  Flowsheets (Taken 4/17/2025 0414)  Prevent/manage excess moisture: Cleanse incontinence/protect with barrier cream  Goal: Prevent/minimize sheer/friction injuries  4/17/2025 0415 by Markie Coello RN  Outcome: Progressing  Flowsheets (Taken 4/17/2025 0415)  Prevent/minimize sheer/friction injuries:   HOB 30 degrees or less   Turn/reposition every 2 hours/use positioning/transfer devices  4/17/2025 0414 by Markie Coello RN  Outcome: Progressing  Flowsheets (Taken 4/17/2025 0414)  Prevent/minimize sheer/friction injuries:   HOB 30 degrees or less   Turn/reposition every 2 hours/use positioning/transfer devices  Goal: Promote/optimize nutrition  4/17/2025 0415 by Markie Coello RN  Outcome: Progressing  Flowsheets (Taken 4/17/2025 0415)  Promote/optimize nutrition: Monitor/record intake including meals  4/17/2025 0414 by Markie Coello RN  Outcome: Progressing  Flowsheets (Taken 4/17/2025 0414)  Promote/optimize nutrition: Monitor/record intake including meals  Goal: Promote skin healing  4/17/2025 0415 by Markie Coello RN  Outcome: Progressing  Flowsheets (Taken 4/17/2025 0415)  Promote skin healing:   Assess skin/pad under line(s)/device(s)   Turn/reposition every 2 hours/use positioning/transfer devices  4/17/2025 0414 by Markie Coello RN  Outcome: Progressing  Flowsheets (Taken 4/17/2025 0414)  Promote skin healing:   Assess skin/pad under line(s)/device(s)   Turn/reposition every 2 hours/use positioning/transfer devices     Problem: Knowledge Deficit  Goal: Patient/family/caregiver demonstrates understanding of disease process, treatment plan, medications, and discharge instructions  4/17/2025 0415 by Markie Coello RN  Outcome:  Progressing  4/17/2025 0414 by Markie Coello RN  Outcome: Progressing     Problem: Mechanical Ventilation  Goal: Patient Will Maintain Patent Airway  4/17/2025 0415 by Markie Coello RN  Outcome: Progressing  4/17/2025 0414 by Markie Coello RN  Outcome: Progressing  Goal: Oral health is maintained or improved  4/17/2025 0415 by Markie Coello RN  Outcome: Progressing  4/17/2025 0414 by Markie Coello RN  Outcome: Progressing  Goal: Tracheostomy will be managed safely  4/17/2025 0415 by Markie Coello RN  Outcome: Progressing  4/17/2025 0414 by Markie Coello RN  Outcome: Progressing  Goal: ET tube will be managed safely  4/17/2025 0415 by Markie Coello RN  Outcome: Progressing  4/17/2025 0414 by Markie Coello RN  Outcome: Progressing  Goal: Ability to express needs and understand communication  4/17/2025 0415 by Markie Coello RN  Outcome: Progressing  4/17/2025 0414 by Markie Coello RN  Outcome: Progressing  Goal: Mobility/activity is maintained at optimum level for patient  4/17/2025 0415 by Markie Coello RN  Outcome: Progressing  4/17/2025 0414 by Markie Coello RN  Outcome: Progressing

## 2025-04-18 LAB
ALBUMIN SERPL BCP-MCNC: 3.2 G/DL (ref 3.4–5)
ANION GAP SERPL CALC-SCNC: 13 MMOL/L (ref 10–20)
APTT PPP: 23 SECONDS (ref 26–36)
ATRIAL RATE: 96 BPM
BASOPHILS # BLD AUTO: 0.02 X10*3/UL (ref 0–0.1)
BASOPHILS NFR BLD AUTO: 0.3 %
BUN SERPL-MCNC: 10 MG/DL (ref 6–23)
CA-I BLD-SCNC: 0.94 MMOL/L (ref 1.1–1.33)
CALCIUM SERPL-MCNC: 7.9 MG/DL (ref 8.6–10.6)
CARBOXYTHC UR-MCNC: 288 NG/ML
CARDIAC TROPONIN I PNL SERPL HS: 52 NG/L (ref 0–53)
CARDIAC TROPONIN I PNL SERPL HS: 67 NG/L (ref 0–53)
CARDIAC TROPONIN I PNL SERPL HS: 71 NG/L (ref 0–53)
CARDIAC TROPONIN I PNL SERPL HS: 75 NG/L (ref 0–53)
CARDIAC TROPONIN I PNL SERPL HS: 78 NG/L (ref 0–53)
CHLORIDE SERPL-SCNC: 100 MMOL/L (ref 98–107)
CO2 SERPL-SCNC: 27 MMOL/L (ref 21–32)
CREAT SERPL-MCNC: 1.05 MG/DL (ref 0.5–1.3)
EGFRCR SERPLBLD CKD-EPI 2021: 80 ML/MIN/1.73M*2
EOSINOPHIL # BLD AUTO: 0.12 X10*3/UL (ref 0–0.7)
EOSINOPHIL NFR BLD AUTO: 2.1 %
ERYTHROCYTE [DISTWIDTH] IN BLOOD BY AUTOMATED COUNT: 15.4 % (ref 11.5–14.5)
GLUCOSE BLD MANUAL STRIP-MCNC: 106 MG/DL (ref 74–99)
GLUCOSE BLD MANUAL STRIP-MCNC: 71 MG/DL (ref 74–99)
GLUCOSE BLD MANUAL STRIP-MCNC: 78 MG/DL (ref 74–99)
GLUCOSE BLD MANUAL STRIP-MCNC: 81 MG/DL (ref 74–99)
GLUCOSE BLD MANUAL STRIP-MCNC: 83 MG/DL (ref 74–99)
GLUCOSE BLD MANUAL STRIP-MCNC: 85 MG/DL (ref 74–99)
GLUCOSE BLD MANUAL STRIP-MCNC: 97 MG/DL (ref 74–99)
GLUCOSE SERPL-MCNC: 94 MG/DL (ref 74–99)
HCT VFR BLD AUTO: 29.9 % (ref 41–52)
HGB BLD-MCNC: 10.5 G/DL (ref 13.5–17.5)
IMM GRANULOCYTES # BLD AUTO: 0.05 X10*3/UL (ref 0–0.7)
IMM GRANULOCYTES NFR BLD AUTO: 0.9 % (ref 0–0.9)
INR PPP: 1.1 (ref 0.9–1.1)
LYMPHOCYTES # BLD AUTO: 0.89 X10*3/UL (ref 1.2–4.8)
LYMPHOCYTES NFR BLD AUTO: 15.3 %
MAGNESIUM SERPL-MCNC: 1.97 MG/DL (ref 1.6–2.4)
MCH RBC QN AUTO: 30.4 PG (ref 26–34)
MCHC RBC AUTO-ENTMCNC: 35.1 G/DL (ref 32–36)
MCV RBC AUTO: 87 FL (ref 80–100)
MONOCYTES # BLD AUTO: 0.41 X10*3/UL (ref 0.1–1)
MONOCYTES NFR BLD AUTO: 7 %
NEUTROPHILS # BLD AUTO: 4.34 X10*3/UL (ref 1.2–7.7)
NEUTROPHILS NFR BLD AUTO: 74.4 %
NRBC BLD-RTO: 0 /100 WBCS (ref 0–0)
P OFFSET: 171 MS
P ONSET: 151 MS
PHOSPHATE SERPL-MCNC: 2.8 MG/DL (ref 2.5–4.9)
PLATELET # BLD AUTO: 129 X10*3/UL (ref 150–450)
POTASSIUM SERPL-SCNC: 3.3 MMOL/L (ref 3.5–5.3)
PROTHROMBIN TIME: 12 SECONDS (ref 9.8–12.4)
Q ONSET: 227 MS
QRS COUNT: 16 BEATS
QRS DURATION: 88 MS
QT INTERVAL: 378 MS
QTC CALCULATION(BAZETT): 480 MS
QTC FREDERICIA: 443 MS
R AXIS: 191 DEGREES
RBC # BLD AUTO: 3.45 X10*6/UL (ref 4.5–5.9)
SODIUM SERPL-SCNC: 137 MMOL/L (ref 136–145)
T AXIS: 203 DEGREES
T OFFSET: 416 MS
UFH PPP CHRO-ACNC: 0.1 IU/ML (ref ?–1.1)
UFH PPP CHRO-ACNC: 0.2 IU/ML (ref ?–1.1)
UFH PPP CHRO-ACNC: 0.2 IU/ML (ref ?–1.1)
VANCOMYCIN SERPL-MCNC: 8.8 UG/ML (ref 5–20)
VENTRICULAR RATE: 97 BPM
WBC # BLD AUTO: 5.8 X10*3/UL (ref 4.4–11.3)

## 2025-04-18 PROCEDURE — 2500000001 HC RX 250 WO HCPCS SELF ADMINISTERED DRUGS (ALT 637 FOR MEDICARE OP)

## 2025-04-18 PROCEDURE — 2500000004 HC RX 250 GENERAL PHARMACY W/ HCPCS (ALT 636 FOR OP/ED): Mod: JZ

## 2025-04-18 PROCEDURE — 2500000004 HC RX 250 GENERAL PHARMACY W/ HCPCS (ALT 636 FOR OP/ED): Performed by: STUDENT IN AN ORGANIZED HEALTH CARE EDUCATION/TRAINING PROGRAM

## 2025-04-18 PROCEDURE — 82947 ASSAY GLUCOSE BLOOD QUANT: CPT

## 2025-04-18 PROCEDURE — 85610 PROTHROMBIN TIME: CPT | Performed by: STUDENT IN AN ORGANIZED HEALTH CARE EDUCATION/TRAINING PROGRAM

## 2025-04-18 PROCEDURE — 2500000004 HC RX 250 GENERAL PHARMACY W/ HCPCS (ALT 636 FOR OP/ED): Mod: JZ | Performed by: REGISTERED NURSE

## 2025-04-18 PROCEDURE — 95715 VEEG EA 12-26HR INTMT MNTR: CPT

## 2025-04-18 PROCEDURE — 99221 1ST HOSP IP/OBS SF/LOW 40: CPT | Performed by: STUDENT IN AN ORGANIZED HEALTH CARE EDUCATION/TRAINING PROGRAM

## 2025-04-18 PROCEDURE — 2500000004 HC RX 250 GENERAL PHARMACY W/ HCPCS (ALT 636 FOR OP/ED)

## 2025-04-18 PROCEDURE — 36415 COLL VENOUS BLD VENIPUNCTURE: CPT

## 2025-04-18 PROCEDURE — 84484 ASSAY OF TROPONIN QUANT: CPT

## 2025-04-18 PROCEDURE — 2500000001 HC RX 250 WO HCPCS SELF ADMINISTERED DRUGS (ALT 637 FOR MEDICARE OP): Performed by: STUDENT IN AN ORGANIZED HEALTH CARE EDUCATION/TRAINING PROGRAM

## 2025-04-18 PROCEDURE — 80069 RENAL FUNCTION PANEL: CPT

## 2025-04-18 PROCEDURE — 95720 EEG PHY/QHP EA INCR W/VEEG: CPT | Performed by: PSYCHIATRY & NEUROLOGY

## 2025-04-18 PROCEDURE — 83735 ASSAY OF MAGNESIUM: CPT

## 2025-04-18 PROCEDURE — 85025 COMPLETE CBC W/AUTO DIFF WBC: CPT

## 2025-04-18 PROCEDURE — 99291 CRITICAL CARE FIRST HOUR: CPT

## 2025-04-18 PROCEDURE — 82330 ASSAY OF CALCIUM: CPT

## 2025-04-18 PROCEDURE — 2500000005 HC RX 250 GENERAL PHARMACY W/O HCPCS: Performed by: STUDENT IN AN ORGANIZED HEALTH CARE EDUCATION/TRAINING PROGRAM

## 2025-04-18 PROCEDURE — 2020000001 HC ICU ROOM DAILY

## 2025-04-18 PROCEDURE — 85520 HEPARIN ASSAY: CPT | Performed by: STUDENT IN AN ORGANIZED HEALTH CARE EDUCATION/TRAINING PROGRAM

## 2025-04-18 PROCEDURE — 80202 ASSAY OF VANCOMYCIN: CPT

## 2025-04-18 RX ORDER — DIAZEPAM 5 MG/ML
INJECTION, SOLUTION INTRAMUSCULAR; INTRAVENOUS
Status: COMPLETED
Start: 2025-04-18 | End: 2025-04-18

## 2025-04-18 RX ORDER — POTASSIUM CHLORIDE 14.9 MG/ML
20 INJECTION INTRAVENOUS
Status: COMPLETED | OUTPATIENT
Start: 2025-04-18 | End: 2025-04-18

## 2025-04-18 RX ORDER — QUETIAPINE FUMARATE 25 MG/1
25 TABLET, FILM COATED ORAL EVERY 6 HOURS PRN
Status: DISCONTINUED | OUTPATIENT
Start: 2025-04-18 | End: 2025-04-20 | Stop reason: HOSPADM

## 2025-04-18 RX ORDER — LORAZEPAM 2 MG/ML
2 INJECTION INTRAMUSCULAR ONCE
Status: DISCONTINUED | OUTPATIENT
Start: 2025-04-18 | End: 2025-04-18

## 2025-04-18 RX ORDER — LORAZEPAM 2 MG/ML
1 INJECTION INTRAMUSCULAR ONCE
Status: COMPLETED | OUTPATIENT
Start: 2025-04-15 | End: 2025-04-15

## 2025-04-18 RX ORDER — DIAZEPAM 5 MG/ML
5 INJECTION, SOLUTION INTRAMUSCULAR; INTRAVENOUS EVERY 2 HOUR PRN
Status: DISCONTINUED | OUTPATIENT
Start: 2025-04-18 | End: 2025-04-18

## 2025-04-18 RX ORDER — PHENOBARBITAL SODIUM 130 MG/ML
130 INJECTION, SOLUTION INTRAMUSCULAR; INTRAVENOUS
Status: DISCONTINUED | OUTPATIENT
Start: 2025-04-18 | End: 2025-04-19

## 2025-04-18 RX ORDER — PHENOBARBITAL SODIUM 130 MG/ML
260 INJECTION, SOLUTION INTRAMUSCULAR; INTRAVENOUS
Status: DISCONTINUED | OUTPATIENT
Start: 2025-04-18 | End: 2025-04-19

## 2025-04-18 RX ORDER — LORAZEPAM 2 MG/ML
2 INJECTION INTRAMUSCULAR ONCE
Status: DISCONTINUED | OUTPATIENT
Start: 2025-04-14 | End: 2025-04-18

## 2025-04-18 RX ADMIN — THIAMINE HYDROCHLORIDE 500 MG: 100 INJECTION, SOLUTION INTRAMUSCULAR; INTRAVENOUS at 06:14

## 2025-04-18 RX ADMIN — PIPERACILLIN SODIUM AND TAZOBACTAM SODIUM 4.5 G: 4; .5 INJECTION, SOLUTION INTRAVENOUS at 12:30

## 2025-04-18 RX ADMIN — PANTOPRAZOLE SODIUM 40 MG: 40 INJECTION, POWDER, FOR SOLUTION INTRAVENOUS at 06:14

## 2025-04-18 RX ADMIN — PHENOBARBITAL SODIUM 260 MG: 130 INJECTION INTRAMUSCULAR; INTRAVENOUS at 16:04

## 2025-04-18 RX ADMIN — DIAZEPAM 5 MG: 5 INJECTION, SOLUTION INTRAMUSCULAR; INTRAVENOUS at 01:32

## 2025-04-18 RX ADMIN — PHENOBARBITAL SODIUM 130 MG: 130 INJECTION INTRAMUSCULAR; INTRAVENOUS at 20:13

## 2025-04-18 RX ADMIN — HEPARIN SODIUM 1400 UNITS/HR: 10000 INJECTION, SOLUTION INTRAVENOUS at 12:31

## 2025-04-18 RX ADMIN — PHENOBARBITAL SODIUM 260 MG: 130 INJECTION INTRAMUSCULAR; INTRAVENOUS at 15:44

## 2025-04-18 RX ADMIN — DIAZEPAM 5 MG: 5 INJECTION, SOLUTION INTRAMUSCULAR; INTRAVENOUS at 14:18

## 2025-04-18 RX ADMIN — APIXABAN 5 MG: 5 TABLET, FILM COATED ORAL at 21:07

## 2025-04-18 RX ADMIN — PIPERACILLIN SODIUM AND TAZOBACTAM SODIUM 4.5 G: 4; .5 INJECTION, SOLUTION INTRAVENOUS at 06:14

## 2025-04-18 RX ADMIN — PHENOBARBITAL SODIUM 260 MG: 130 INJECTION INTRAMUSCULAR; INTRAVENOUS at 18:18

## 2025-04-18 RX ADMIN — LEVETIRACETAM 1500 MG: 500 TABLET, FILM COATED ORAL at 09:41

## 2025-04-18 RX ADMIN — PHENOBARBITAL SODIUM 97.5 MG: 130 INJECTION INTRAMUSCULAR; INTRAVENOUS at 12:28

## 2025-04-18 RX ADMIN — Medication 1 TABLET: at 09:41

## 2025-04-18 RX ADMIN — THIAMINE HYDROCHLORIDE 500 MG: 100 INJECTION, SOLUTION INTRAMUSCULAR; INTRAVENOUS at 22:12

## 2025-04-18 RX ADMIN — POTASSIUM CHLORIDE 20 MEQ: 14.9 INJECTION, SOLUTION INTRAVENOUS at 04:43

## 2025-04-18 RX ADMIN — VANCOMYCIN HYDROCHLORIDE 1500 MG: 1.5 INJECTION, POWDER, LYOPHILIZED, FOR SOLUTION INTRAVENOUS at 22:03

## 2025-04-18 RX ADMIN — Medication 50 L/MIN: at 07:51

## 2025-04-18 RX ADMIN — LEVETIRACETAM 1500 MG: 500 TABLET, FILM COATED ORAL at 21:07

## 2025-04-18 RX ADMIN — FOLIC ACID 1 MG: 1 TABLET ORAL at 09:41

## 2025-04-18 RX ADMIN — PHENOBARBITAL SODIUM 800 MG: 130 INJECTION INTRAMUSCULAR; INTRAVENOUS at 15:52

## 2025-04-18 RX ADMIN — THIAMINE HYDROCHLORIDE 500 MG: 100 INJECTION, SOLUTION INTRAMUSCULAR; INTRAVENOUS at 14:17

## 2025-04-18 RX ADMIN — PHENOBARBITAL SODIUM 97.5 MG: 130 INJECTION INTRAMUSCULAR; INTRAVENOUS at 03:23

## 2025-04-18 RX ADMIN — DIAZEPAM 5 MG: 5 INJECTION, SOLUTION INTRAMUSCULAR; INTRAVENOUS at 05:33

## 2025-04-18 RX ADMIN — POLYETHYLENE GLYCOL 3350 17 G: 17 POWDER, FOR SOLUTION ORAL at 09:42

## 2025-04-18 RX ADMIN — PIPERACILLIN SODIUM AND TAZOBACTAM SODIUM 4.5 G: 4; .5 INJECTION, SOLUTION INTRAVENOUS at 18:10

## 2025-04-18 RX ADMIN — DIAZEPAM 5 MG: 5 INJECTION, SOLUTION INTRAMUSCULAR; INTRAVENOUS at 12:28

## 2025-04-18 RX ADMIN — PHENOBARBITAL SODIUM 260 MG: 130 INJECTION INTRAMUSCULAR; INTRAVENOUS at 16:58

## 2025-04-18 RX ADMIN — HEPARIN SODIUM 1400 UNITS/HR: 10000 INJECTION, SOLUTION INTRAVENOUS at 02:20

## 2025-04-18 RX ADMIN — POTASSIUM CHLORIDE 20 MEQ: 14.9 INJECTION, SOLUTION INTRAVENOUS at 06:14

## 2025-04-18 RX ADMIN — PHENOBARBITAL SODIUM 65 MG: 130 INJECTION INTRAMUSCULAR; INTRAVENOUS at 08:39

## 2025-04-18 ASSESSMENT — LIFESTYLE VARIABLES
TOTAL SCORE: 4
AGITATION: PACES BACK AND FORTH DURING MOST OF THE INTERVIEW, OR CONSTANTLY THRASHES ABOUT
VISUAL DISTURBANCES: CONTINUOUS HALLUCINATIONS
ORIENTATION AND CLOUDING OF SENSORIUM: DISORIENTED FOR DATE BY MORE THAN 2 CALENDAR DAYS
AGITATION: NORMAL ACTIVITY
ANXIETY: MODERATELY ANXIOUS, OR GUARDED, SO ANXIETY IS INFERRED
TOTAL SCORE: 30
VISUAL DISTURBANCES: SEVERE HALLUCINATIONS
PAROXYSMAL SWEATS: NO SWEAT VISIBLE
TREMOR: NOT VISIBLE, BUT CAN BE FELT FINGERTIP TO FINGERTIP
NAUSEA AND VOMITING: NO NAUSEA AND NO VOMITING
TOTAL SCORE: 27
NAUSEA AND VOMITING: NO NAUSEA AND NO VOMITING
AGITATION: 5
ORIENTATION AND CLOUDING OF SENSORIUM: DISORIENTED FOR DATE BY MORE THAN 2 CALENDAR DAYS
PAROXYSMAL SWEATS: BARELY PERCEPTIBLE SWEATING, PALMS MOIST
TOTAL SCORE: 26
AUDITORY DISTURBANCES: NOT PRESENT
TOTAL SCORE: 25
HEADACHE, FULLNESS IN HEAD: NOT PRESENT
AGITATION: 5
TACTILE DISTURBANCES: SEVERE HALLUCINATIONS
AGITATION: 6
NAUSEA AND VOMITING: NO NAUSEA AND NO VOMITING
NAUSEA AND VOMITING: NO NAUSEA AND NO VOMITING
VISUAL DISTURBANCES: CONTINUOUS HALLUCINATIONS
HEADACHE, FULLNESS IN HEAD: NOT PRESENT
HEADACHE, FULLNESS IN HEAD: NOT PRESENT
NAUSEA AND VOMITING: NO NAUSEA AND NO VOMITING
ORIENTATION AND CLOUDING OF SENSORIUM: DISORIENTED FOR DATE BY MORE THAN 2 CALENDAR DAYS
TREMOR: 2
AUDITORY DISTURBANCES: NOT PRESENT
ANXIETY: NO ANXIETY, AT EASE
NAUSEA AND VOMITING: NO NAUSEA AND NO VOMITING
VISUAL DISTURBANCES: CONTINUOUS HALLUCINATIONS
TOTAL SCORE: 25
AUDITORY DISTURBANCES: MODERATE HARSHNESS OR ABILITY TO FRIGHTEN
AUDITORY DISTURBANCES: MODERATE HARSHNESS OR ABILITY TO FRIGHTEN
TREMOR: 2
NAUSEA AND VOMITING: NO NAUSEA AND NO VOMITING
AGITATION: MODERATELY FIDGETY AND RESTLESS
TREMOR: NO TREMOR
VISUAL DISTURBANCES: VERY MILD SENSITIVITY
TREMOR: 2
TOTAL SCORE: 33
PAROXYSMAL SWEATS: BARELY PERCEPTIBLE SWEATING, PALMS MOIST
PAROXYSMAL SWEATS: BARELY PERCEPTIBLE SWEATING, PALMS MOIST
HEADACHE, FULLNESS IN HEAD: NOT PRESENT
ORIENTATION AND CLOUDING OF SENSORIUM: DISORIENTED FOR PLACE OR PERSON
VISUAL DISTURBANCES: CONTINUOUS HALLUCINATIONS
AGITATION: 5
AGITATION: MODERATELY FIDGETY AND RESTLESS
NAUSEA AND VOMITING: NO NAUSEA AND NO VOMITING
TREMOR: NO TREMOR
VISUAL DISTURBANCES: SEVERE HALLUCINATIONS
ORIENTATION AND CLOUDING OF SENSORIUM: DISORIENTED FOR DATE BY MORE THAN 2 CALENDAR DAYS
ORIENTATION AND CLOUDING OF SENSORIUM: DISORIENTED FOR PLACE OR PERSON
ANXIETY: 5
TREMOR: 2
ORIENTATION AND CLOUDING OF SENSORIUM: CANNOT DO SERIAL ADDITIONS OR IS UNCERTAIN ABOUT DATE
PAROXYSMAL SWEATS: NO SWEAT VISIBLE
AUDITORY DISTURBANCES: MODERATE HARSHNESS OR ABILITY TO FRIGHTEN
ANXIETY: 5
AGITATION: SOMEWHAT MORE THAN NORMAL ACTIVITY
ANXIETY: EQUIVALENT TO ACUTE PANIC STATES AS SEEN IN SEVERE DELIRIUM OR ACUTE SCHIZOPHRENIC REACTIONS
HEADACHE, FULLNESS IN HEAD: NOT PRESENT
TOTAL SCORE: 25
HEADACHE, FULLNESS IN HEAD: NOT PRESENT
VISUAL DISTURBANCES: NOT PRESENT
TOTAL SCORE: 2
NAUSEA AND VOMITING: NO NAUSEA AND NO VOMITING
TOTAL SCORE: 8
AUDITORY DISTURBANCES: NOT PRESENT
AGITATION: NORMAL ACTIVITY
AUDITORY DISTURBANCES: MODERATE HARSHNESS OR ABILITY TO FRIGHTEN
ORIENTATION AND CLOUDING OF SENSORIUM: DISORIENTED FOR DATE BY MORE THAN 2 CALENDAR DAYS
ANXIETY: 3
AUDITORY DISTURBANCES: MODERATE HARSHNESS OR ABILITY TO FRIGHTEN
AUDITORY DISTURBANCES: MODERATE HARSHNESS OR ABILITY TO FRIGHTEN
PAROXYSMAL SWEATS: BEADS OF SWEAT OBVIOUS ON FOREHEAD
PAROXYSMAL SWEATS: BEADS OF SWEAT OBVIOUS ON FOREHEAD
HEADACHE, FULLNESS IN HEAD: NOT PRESENT
VISUAL DISTURBANCES: NOT PRESENT
PAROXYSMAL SWEATS: 2
TREMOR: 2
ORIENTATION AND CLOUDING OF SENSORIUM: DISORIENTED FOR DATE BY MORE THAN 2 CALENDAR DAYS
VISUAL DISTURBANCES: CONTINUOUS HALLUCINATIONS
HEADACHE, FULLNESS IN HEAD: NOT PRESENT
ANXIETY: MILDLY ANXIOUS
VISUAL DISTURBANCES: CONTINUOUS HALLUCINATIONS
ANXIETY: EQUIVALENT TO ACUTE PANIC STATES AS SEEN IN SEVERE DELIRIUM OR ACUTE SCHIZOPHRENIC REACTIONS
PAROXYSMAL SWEATS: BEADS OF SWEAT OBVIOUS ON FOREHEAD
ORIENTATION AND CLOUDING OF SENSORIUM: DISORIENTED FOR DATE BY MORE THAN 2 CALENDAR DAYS
ORIENTATION AND CLOUDING OF SENSORIUM: DISORIENTED FOR PLACE OR PERSON
AUDITORY DISTURBANCES: NOT PRESENT
TREMOR: 2
TREMOR: NOT VISIBLE, BUT CAN BE FELT FINGERTIP TO FINGERTIP
ANXIETY: EQUIVALENT TO ACUTE PANIC STATES AS SEEN IN SEVERE DELIRIUM OR ACUTE SCHIZOPHRENIC REACTIONS
TOTAL SCORE: 25
ANXIETY: MODERATELY ANXIOUS, OR GUARDED, SO ANXIETY IS INFERRED
TREMOR: NO TREMOR
AUDITORY DISTURBANCES: NOT PRESENT
AGITATION: MODERATELY FIDGETY AND RESTLESS
HEADACHE, FULLNESS IN HEAD: NOT PRESENT
HEADACHE, FULLNESS IN HEAD: NOT PRESENT
ANXIETY: NO ANXIETY, AT EASE
HEADACHE, FULLNESS IN HEAD: NOT PRESENT
PAROXYSMAL SWEATS: BEADS OF SWEAT OBVIOUS ON FOREHEAD
PAROXYSMAL SWEATS: BARELY PERCEPTIBLE SWEATING, PALMS MOIST

## 2025-04-18 ASSESSMENT — COGNITIVE AND FUNCTIONAL STATUS - GENERAL
MOVING TO AND FROM BED TO CHAIR: TOTAL
DRESSING REGULAR LOWER BODY CLOTHING: TOTAL
MOVING FROM LYING ON BACK TO SITTING ON SIDE OF FLAT BED WITH BEDRAILS: A LITTLE
HELP NEEDED FOR BATHING: TOTAL
STANDING UP FROM CHAIR USING ARMS: TOTAL
EATING MEALS: TOTAL
DAILY ACTIVITIY SCORE: 6
MOBILITY SCORE: 8
DRESSING REGULAR UPPER BODY CLOTHING: TOTAL
WALKING IN HOSPITAL ROOM: TOTAL
TURNING FROM BACK TO SIDE WHILE IN FLAT BAD: TOTAL
TOILETING: TOTAL
CLIMB 3 TO 5 STEPS WITH RAILING: TOTAL
PERSONAL GROOMING: TOTAL

## 2025-04-18 ASSESSMENT — PAIN DESCRIPTION - DESCRIPTORS: DESCRIPTORS: CRAMPING;ACHING

## 2025-04-18 ASSESSMENT — PAIN - FUNCTIONAL ASSESSMENT
PAIN_FUNCTIONAL_ASSESSMENT: 0-10
PAIN_FUNCTIONAL_ASSESSMENT: 0-10
PAIN_FUNCTIONAL_ASSESSMENT: CPOT (CRITICAL CARE PAIN OBSERVATION TOOL)
PAIN_FUNCTIONAL_ASSESSMENT: 0-10
PAIN_FUNCTIONAL_ASSESSMENT: 0-10
PAIN_FUNCTIONAL_ASSESSMENT: CPOT (CRITICAL CARE PAIN OBSERVATION TOOL)
PAIN_FUNCTIONAL_ASSESSMENT: 0-10

## 2025-04-18 ASSESSMENT — PAIN SCALES - GENERAL
PAINLEVEL_OUTOF10: 0 - NO PAIN
PAINLEVEL_OUTOF10: 0 - NO PAIN
PAINLEVEL_OUTOF10: 6
PAINLEVEL_OUTOF10: 0 - NO PAIN
PAINLEVEL_OUTOF10: 0 - NO PAIN

## 2025-04-18 NOTE — PROGRESS NOTES
Occupational Therapy                 Therapy Communication Note    Patient Name: Elvis Masters  MRN: [id number]  Department: [facility]  Room: 15/15-A  Today's Date: 4/18/2025     Discipline: Occupational Therapy    OT Missed Visit: Yes     Missed Visit Reason: Missed Visit Reason:  (Per communication with RN, pt highly agitated and hallucinating. No OT at this time.)    Missed Time: Attempt 0920    Comment:

## 2025-04-18 NOTE — SIGNIFICANT EVENT
Notified by  that patient is out of network for insurance at [facility]. [facility] network contacted on 4/18 to initiate transfer request.    Alcides Dodd MD  PGY2 Neurology

## 2025-04-18 NOTE — CARE PLAN
Problem: Pain - Adult  Goal: Verbalizes/displays adequate comfort level or baseline comfort level  Outcome: Progressing     Problem: Safety - Adult  Goal: Free from fall injury  Outcome: Progressing     Problem: Discharge Planning  Goal: Discharge to home or other facility with appropriate resources  Outcome: Progressing     Problem: Chronic Conditions and Co-morbidities  Goal: Patient's chronic conditions and co-morbidity symptoms are monitored and maintained or improved  Outcome: Progressing     Problem: Nutrition  Goal: Nutrient intake appropriate for maintaining nutritional needs  Outcome: Progressing     Problem: Skin  Goal: Decreased wound size/increased tissue granulation at next dressing change  Outcome: Progressing     Problem: Skin  Goal: Prevent/manage excess moisture  Outcome: Progressing     Problem: Skin  Goal: Prevent/minimize sheer/friction injuries  Outcome: Progressing     Problem: Skin  Goal: Promote/optimize nutrition  Outcome: Progressing     Problem: Skin  Goal: Promote skin healing  Outcome: Progressing     Problem: Fall/Injury  Goal: Not fall by end of shift  Outcome: Progressing     Problem: Fall/Injury  Goal: Be free from injury by end of the shift  Outcome: Progressing     Problem: Fall/Injury  Goal: Verbalize understanding of personal risk factors for fall in the hospital  Outcome: Progressing     Problem: Safety - Medical Restraint  Goal: Remains free of injury from restraints (Restraint for Interference with Medical Device)  Outcome: Progressing    The clinical goals for the shift include continue with CIWA protocol and ensure patient safety.     Over the shift, the patient did not make progress toward the following goals. Barriers to progression include confusion, agitation, and poor safety awareness Recommendations to address these barriers include collaborating with the physicians to address the pharmacologic and nonpharmacologic interventions for his confusion.    Problem:  Skin  Goal: Participates in plan/prevention/treatment measures  Outcome: Not Progressing     Problem: Fall/Injury  Goal: Verbalize understanding of risk factor reduction measures to prevent injury from fall in the home  Outcome: Not Progressing  Goal: Use assistive devices by end of the shift  Outcome: Not Progressing  Goal: Pace activities to prevent fatigue by end of the shift  Outcome: Not Progressing     Problem: Safety - Medical Restraint  Goal: Free from restraint(s) (Restraint for Interference with Medical Device)  Outcome: Not Progressing
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
## 2025-04-18 NOTE — PROGRESS NOTES
Vancomycin Dosing by Pharmacy- FOLLOW UP    Elvis Masters is a 64 y.o. year old male who Pharmacy has been consulted for vancomycin dosing for pneumonia. Based on the patient's indication and renal status this patient is being dosed based on a goal AUC of 400-600.     Renal function is currently stable.    Received Vanco 2000mg on     Visit Vitals  /83   Pulse 98   Temp 36.5 °C (97.7 °F) (Temporal)   Resp 12        Lab Results   Component Value Date    CREATININE 1.05 2025    CREATININE 1.33 (H) 2025    CREATININE 1.48 (H) 2025    CREATININE 2.68 (H) 2025        Patient weight is as follows:   Vitals:    25 0000   Weight: (!) 170 kg (374 lb 12.5 oz)       Cultures:  No results found for the encounter in last 14 days.       I/O last 3 completed shifts:  In: 1850 (10.9 mL/kg) [P.O.:500; I.V.:50 (0.3 mL/kg); IV Piggyback:1300]  Out: 2330 (13.7 mL/kg) [Urine:2330 (0.4 mL/kg/hr)]  Weight: 170 kg   I/O during current shift:  No intake/output data recorded.    Temp (24hrs), Av.8 °C (98.3 °F), Min:36.4 °C (97.5 °F), Max:37.5 °C (99.5 °F)      Assessment/Plan  Renal function improving. Will start AUC dosing:    Below goal AUC. Orders placed for new vancomcyin regimen of 1500mg every 12 hours to begin at  at 2100.     This dosing regimen is predicted by [employer] to result in the following pharmacokinetic parameters:    Loading dose: N/A  Regimen: 1500 mg IV every 12 hours.  Start time: 21:36 on 2025  Exposure target: AUC24 (range)400-600 mg/L.hr   [id number]: 533 mg/L.hr  AUC24,ss: 572 mg/L.hr  Probability of AUC24 > 400: 100 %  Ctrough,ss: 16.8 mg/L  Probability of Ctrough,ss > 20: 1 %      The next level will be obtained on  at 1400. May be obtained sooner if clinically indicated.   Will continue to monitor renal function daily while on vancomycin and order serum creatinine at least every 48 hours if not already ordered.  Follow for continued vancomycin needs,  clinical response, and signs/symptoms of toxicity.       Juventino Jacob, PharmD

## 2025-04-18 NOTE — PROGRESS NOTES
Communication Note    Patient Name: Elvis Masters  MRN: [id number]  Today's Date: 4/18/2025   Room: 15/15A    Discipline: Physical Therapy      PT Missed Visit: Yes  Missed Visit Reason:  (Pt agitated. Not appropriate for PT.)      04/18/25 at 9:57 AM   Myriam Solitario PT   Rehab Office: [phone]

## 2025-04-18 NOTE — SIGNIFICANT EVENT
"Preliminary EEG Report     This vEEG is consistent with a severe diffuse encephalopathy. No epileptiform discharges or lateralizing signs seen.       This EEG was read from 1109 to 1133 on 04/17/25 .     The final impression will be available tomorrow under Chart Review in the Media tab. If the plan is to discontinue the video EEG, please place a \"Discontinue Continuous VEEG\" order in the EMR; otherwise the EEG tech will not receive the notification.      Ramana Spann DO  Adult Epilepsy Fellow  [facility] [place]     "

## 2025-04-18 NOTE — PROGRESS NOTES
Vancomycin Dosing by Pharmacy- FOLLOW UP    Elvis Masters is a 64 y.o. year old male who Pharmacy has been consulted for vancomycin dosing for pneumonia. Based on the patient's indication and renal status this patient is being dosed based on a goal trough/random level of 15-20.     Renal function is currently improving.    Current vancomycin dose: 1750 mg given once (dosing by levels at this time)    Most recent random level: 11.3 mcg/mL    Visit Vitals  /78   Pulse 96   Temp 36.7 °C (98.1 °F) (Temporal)   Resp 12        Lab Results   Component Value Date    CREATININE 1.33 (H) 2025    CREATININE 1.48 (H) 2025    CREATININE 2.68 (H) 2025    CREATININE 1.23 04/15/2025        Patient weight is as follows:   Vitals:    25 0932   Weight: (!) 160 kg (351 lb 10.1 oz)       Cultures:  No results found for the encounter in last 14 days.       I/O last 3 completed shifts:  In: 100 (0.6 mL/kg) [IV Piggyback:100]  Out: 630 (3.9 mL/kg) [Urine:630 (0.1 mL/kg/hr)]  Weight: 159.5 kg   I/O during current shift:  I/O this shift:  In: 100 [IV Piggyback:100]  Out: 60 [Urine:60]    Temp (24hrs), Av.3 °C (97.3 °F), Min:35.4 °C (95.7 °F), Max:36.8 °C (98.2 °F)      Assessment/Plan    Below goal random/trough level. We will increase the dose and order a 2000 mg x 1 dose and follow up with a 24H lvl.  The next level will be obtained on  at PM. May be obtained sooner if clinically indicated.   Will continue to monitor renal function daily while on vancomycin and order serum creatinine at least every 48 hours if not already ordered.  Follow for continued vancomycin needs, clinical response, and signs/symptoms of toxicity.       Virgen Londono, PharmD

## 2025-04-18 NOTE — PROGRESS NOTES
04/18/25 1725   Discharge Planning   Living Arrangements Parent   Support Systems Parent   Type of Residence Private residence   Home or Post Acute Services Post acute facilities (Rehab/SNF/etc)   Type of Post Acute Facility Services Other (Comment)  (working on transfer to [facility] inpatient.)   Expected Discharge Disposition Short Term A   Does the patient need discharge transport arranged? Yes   RoundTrip coordination needed? Yes   Has discharge transport been arranged? No     Social Work Discharge Planning note:    -Patient discussed during interdisciplinary rounds.   -Team members present: NP, PT and OT, and SW  -Plan per medical team: Pt is not medically ready for discharge. Downgrade after CIWA.   -Payer: [employer]  -Status: Inpatient  -Discharge disposition: Pt did not appear to be oriented so STEPHEN met with his mother, Violet, to further discuss discharge planning. Pt's insurance is out of network with  and Perlita is aware. Perlita reported that Pt gets all his care through [facility], so she would like for Pt to be transferred to [facility] (inpatient transfer) once medically stable for transport. Medical team updated and they will call [facility] Transfer [facility].  -Potential Barriers: none  -Anticipated Date of Discharge:  4/20/25    This SW   WALDO Patel, LSW    Office: [phone]  Secure chat via [employer]      [id number]:Urgent|| ||00\01||False;

## 2025-04-18 NOTE — CONSULTS
Consults    Reason For Consult    Transfer to medicine    History Of Present Illness    Elvis Masters is a 64 y.o. male with PMH of alcohol use disorder, Afib on eliquis, seizure disorder on keppra, KAYODE noncompliant with CPAP, HFpEF, HTN, HLD, pre-DM who presents as a transfer from [facility] for a seizure and prolonged post-ictal state in the setting of alcohol withdrawal. He initially presented to [facility] on 4/15 for a GTC lasting 10 minutes. He was given a total of 8 mg ativan, loaded with keppra 4.5 g, and a phenobarbital taper was started for withdrawal. He was started on fentanyl/propofol and empiric antibioticss for LLL PNA on CXR. TTE demonstrated EF 50-55% with impaired diastolic filling and he was started on a heparin gtt with home eliquis held. Neurology was consulted and recommended transfer for cvEEG for inability to wean sedation. His keppra level was 37 on admission suggesting compliance. On 4/16 he was admitted to the MICU and attempts were made to wean sedation which were unsuccessful and he developed seizure-like activity for which he got 2 mg of IV ativan. EEG demonstrated severe diffuse encephalopathy. On ./17 he was transferred to [facility] for cvEEG monitoring and overnight he was extubated to HFNC 50/50. He continued to demonstrate visual/auditory hallucinations requiring PRN phenobarbital. Has since been weaned to 4 L NC.     Past Medical History  He has a past medical history of Hypertension.    Surgical History  He has no past surgical history on file.     Social History  He reports that he has never smoked. He has quit using smokeless tobacco. He reports current alcohol use. No history on file for drug use.    Family History  Family History[1]     Allergies  Lisinopril    Review of Systems    12 point ROS reviewed     Physical Exam    General: Awake, alert, in no acute distress. Reports visual hallucinations  Eyes: Gaze conjugate.  No scleral icterus or injection  HENT: Normo-cephalic, atraumatic.  No stridor  CV: Tachycardic rate, regular rhythm. Radial pulses 2+ bilaterally  Resp: Breathing non-labored, speaking in full sentences.  Decreased breath sounds to auscultation bilaterally. On 4L NC.  GI: Soft, mildly distended, non-tender. No rebound or guarding.  MSK/Extremities: No gross bony deformities. Moving all extremities. No LE edema  Skin: Warm. Appropriate color  Neuro: Alert. Oriented. Face symmetric. Speech is fluent.  Gross strength and sensation intact in b/l UE and LEs  Psych: Appropriate mood and affect     Last Recorded Vitals  BP (!) 151/102   Pulse (!) 113   Temp 37 °C (98.6 °F) (Temporal)   Resp 22   Wt (!) 170 kg (374 lb 12.5 oz)   SpO2 95%     Relevant Results    Scheduled medications  Scheduled Medications[2]  Continuous medications  Continuous Medications[3]  PRN medications  PRN Medications[4]      Results for orders placed or performed during the hospital encounter of 04/17/25 (from the past 24 hours)   Troponin I, High Sensitivity   Result Value Ref Range    Troponin I, High Sensitivity (CMC) 50 0 - 53 ng/L   CBC   Result Value Ref Range    WBC 5.5 4.4 - 11.3 x10*3/uL    nRBC 0.0 0.0 - 0.0 /100 WBCs    RBC 3.50 (L) 4.50 - 5.90 x10*6/uL    Hemoglobin 10.6 (L) 13.5 - 17.5 g/dL    Hematocrit 32.8 (L) 41.0 - 52.0 %    MCV 94 80 - 100 fL    MCH 30.3 26.0 - 34.0 pg    MCHC 32.3 32.0 - 36.0 g/dL    RDW 15.0 (H) 11.5 - 14.5 %    Platelets 130 (L) 150 - 450 x10*3/uL   Troponin I, High Sensitivity   Result Value Ref Range    Troponin I, High Sensitivity (CMC) 61 (H) 0 - 53 ng/L   Heparin Assay, UFH   Result Value Ref Range    Heparin Unfractionated 0.2 See Comment Below for Therapeutic Ranges IU/mL   POCT GLUCOSE   Result Value Ref Range    POCT Glucose 83 74 - 99 mg/dL   POCT GLUCOSE   Result Value Ref Range    POCT Glucose 70 (L) 74 - 99 mg/dL   POCT GLUCOSE   Result Value Ref Range    POCT Glucose 106 (H) 74 - 99 mg/dL   Coagulation Screen   Result Value Ref Range    Protime 12.0 9.8 -  12.4 seconds    INR 1.1 0.9 - 1.1    aPTT 23 (L) 26 - 36 seconds   Calcium, ionized   Result Value Ref Range    POCT Calcium, Ionized 0.94 (L) 1.1 - 1.33 mmol/L   CBC and Auto Differential   Result Value Ref Range    WBC 5.8 4.4 - 11.3 x10*3/uL    nRBC 0.0 0.0 - 0.0 /100 WBCs    RBC 3.45 (L) 4.50 - 5.90 x10*6/uL    Hemoglobin 10.5 (L) 13.5 - 17.5 g/dL    Hematocrit 29.9 (L) 41.0 - 52.0 %    MCV 87 80 - 100 fL    MCH 30.4 26.0 - 34.0 pg    MCHC 35.1 32.0 - 36.0 g/dL    RDW 15.4 (H) 11.5 - 14.5 %    Platelets 129 (L) 150 - 450 x10*3/uL    Neutrophils % 74.4 40.0 - 80.0 %    Immature Granulocytes %, Automated 0.9 0.0 - 0.9 %    Lymphocytes % 15.3 13.0 - 44.0 %    Monocytes % 7.0 2.0 - 10.0 %    Eosinophils % 2.1 0.0 - 6.0 %    Basophils % 0.3 0.0 - 2.0 %    Neutrophils Absolute 4.34 1.20 - 7.70 x10*3/uL    Immature Granulocytes Absolute, Automated 0.05 0.00 - 0.70 x10*3/uL    Lymphocytes Absolute 0.89 (L) 1.20 - 4.80 x10*3/uL    Monocytes Absolute 0.41 0.10 - 1.00 x10*3/uL    Eosinophils Absolute 0.12 0.00 - 0.70 x10*3/uL    Basophils Absolute 0.02 0.00 - 0.10 x10*3/uL   Magnesium   Result Value Ref Range    Magnesium 1.97 1.60 - 2.40 mg/dL   Renal function panel   Result Value Ref Range    Glucose 94 74 - 99 mg/dL    Sodium 137 136 - 145 mmol/L    Potassium 3.3 (L) 3.5 - 5.3 mmol/L    Chloride 100 98 - 107 mmol/L    Bicarbonate 27 21 - 32 mmol/L    Anion Gap 13 10 - 20 mmol/L    Urea Nitrogen 10 6 - 23 mg/dL    Creatinine 1.05 0.50 - 1.30 mg/dL    eGFR 80 >60 mL/min/1.73m*2    Calcium 7.9 (L) 8.6 - 10.6 mg/dL    Phosphorus 2.8 2.5 - 4.9 mg/dL    Albumin 3.2 (L) 3.4 - 5.0 g/dL   Troponin I, High Sensitivity   Result Value Ref Range    Troponin I, High Sensitivity (CMC) 75 (H) 0 - 53 ng/L   Heparin Assay, UFH   Result Value Ref Range    Heparin Unfractionated 0.1 See Comment Below for Therapeutic Ranges IU/mL   POCT GLUCOSE   Result Value Ref Range    POCT Glucose 85 74 - 99 mg/dL   Troponin I, High Sensitivity   Result  Value Ref Range    Troponin I, High Sensitivity (CMC) 78 (H) 0 - 53 ng/L   Heparin Assay, UFH   Result Value Ref Range    Heparin Unfractionated 0.2 See Comment Below for Therapeutic Ranges IU/mL   POCT GLUCOSE   Result Value Ref Range    POCT Glucose 83 74 - 99 mg/dL   Troponin I, High Sensitivity   Result Value Ref Range    Troponin I, High Sensitivity (CMC) 71 (H) 0 - 53 ng/L   Troponin I, High Sensitivity   Result Value Ref Range    Troponin I, High Sensitivity (CMC) 67 (H) 0 - 53 ng/L   Heparin Assay, UFH   Result Value Ref Range    Heparin Unfractionated 0.2 See Comment Below for Therapeutic Ranges IU/mL   POCT GLUCOSE   Result Value Ref Range    POCT Glucose 78 74 - 99 mg/dL   POCT GLUCOSE   Result Value Ref Range    POCT Glucose 97 74 - 99 mg/dL        Assessment/Plan     Elvis Masters is a 64 y.o. L handed  male with PMH significant for Alcohol use disorder, persistent A-fib (Eliquis), epilepsy,  KAYODE (noncompliant with CPAP), HFpEF, HTN, HLD, pre-DM, who presents as a transfer from  [facility] for uncontrollable seizures iso Alcohol withdrawal and epilepsy. Pt s/p extubation, however ongoing difficulties with regards to alcohol withdrawal and active delirium tremens.    Recommendations    -Given patient's improved respiratory status after extubation and hemodynamic stability not requiring ICU level of care, we recommend transfer of care to the medicine service.  -Appreciate neuro recs: continue keppra 1.5g BID, cvEEG, and phenobarb taper per primary team, ativan PRN  -Patient has been accepted to the [facility] team. Medicine consults will sign off    Buster Fall, PGY1  Medicine Consults [id number]         [1] No family history on file.  [2] apixaban, 5 mg, oral, q12h  pantoprazole, 40 mg, oral, Daily before breakfast   Or  esomeprazole, 40 mg, nasoduodenal tube, Daily before breakfast   Or  pantoprazole, 40 mg, intravenous, Daily before breakfast  folic acid, 1 mg, oral, Daily  insulin lispro, 0-5 Units,  subcutaneous, q4h  levETIRAcetam, 1,500 mg, nasogastric tube, BID  multivitamin with minerals, 1 tablet, oral, Daily  PHENobarbital, 800 mg, intravenous, Once  piperacillin-tazobactam, 4.5 g, intravenous, q6h  polyethylene glycol, 17 g, oral, Daily  [START ON 4/20/2025] thiamine, 100 mg, oral, Daily  thiamine, 500 mg, intravenous, q8h AURY  [3] heparin, 0-4,000 Units/hr, Last Rate: 1,700 Units/hr (04/18/25 1407)  sodium chloride 0.9%, 50 mL/hr, Last Rate: 50 mL/hr (04/17/25 1841)  [4] PRN medications: albuterol, calcium gluconate, calcium gluconate, dextrose, dextrose, glucagon, glucagon, glucagon, magnesium sulfate, magnesium sulfate, oxygen, PHENobarbital, PHENobarbital, PHENobarbital, potassium chloride CR **OR** potassium chloride, potassium chloride CR **OR** potassium chloride, potassium chloride, QUEtiapine, sennosides-docusate sodium, vancomycin

## 2025-04-18 NOTE — PROGRESS NOTES
Elvis Masters is a 64 y.o. male on day 1 of admission presenting with Seizure (Multi).      Interval hospital course:  63 yo M with hx of alcohol use disorder, persistent a-fib (on eliquis), seizure disorder (on keppra, unknown etiology), KAYODE (noncompliant with CPAP), HFpEF, HTN, HLD, pre-DM transferred [facility] for uncontrollable seizures iso alcohol withdrawal. Admitted to NSU. Extubated to [facility] on 4/17. Phenobarbital taper was continued but he required several pushes of phenobarbital and valium PRN. Medical toxicology consulted on 4/18, recommended reloading with Phenobarbital 800mg with PRN plan.  He was also found to have a PNA, speciation and sensitivites pending. Started on vanc/zosyn.       Subjective   Patient seen today. Doing well with no new complaints. Tolerating orally and passing BM       Objective     Last Recorded Vitals  BP (!) 151/102   Pulse (!) 113   Temp 37 °C (98.6 °F) (Temporal)   Resp 22   Wt (!) 170 kg (374 lb 12.5 oz)   SpO2 95%   Intake/Output last 3 Shifts:    Intake/Output Summary (Last 24 hours) at 4/18/2025 1538  Last data filed at 4/18/2025 1404  Gross per 24 hour   Intake 1250 ml   Output 1870 ml   Net -620 ml       Admission Weight  Weight: (!) 160 kg (351 lb 10.1 oz) (04/17/25 0932)    Daily Weight  04/18/25 : (!) 170 kg (374 lb 12.5 oz)    Image Results  EEG  IMPRESSION    Impression    This vEEG is indicative of mild diffuse encephalopathy. No epileptiform discharges or lateralizing signs are noted. The degree of encephalopathy improves as the record progresses.    A full report will be scanned into the patient's chart at a later time.    This report has been interpreted and electronically signed by  ECG 12 lead  *** Suspect arm lead reversal, interpretation assumes no reversal  Accelerated Junctional rhythm with occasional Premature ventricular complexes  Lateral infarct (cited on or before 17-APR-2025)  Inferior infarct (cited on or before 17-APR-2025)  Abnormal  ECG  When compared with ECG of 17-APR-2025 14:20,  Serial changes of evolving Lateral infarct Present      Physical Exam  Constitutional:       Appearance: Normal appearance.   Cardiovascular:      Rate and Rhythm: Normal rate and regular rhythm.      Pulses: Normal pulses.      Heart sounds: Normal heart sounds.   Pulmonary:      Effort: Pulmonary effort is normal.      Breath sounds: Normal breath sounds.   Abdominal:      General: Abdomen is flat. There is no distension.      Palpations: Abdomen is soft.      Tenderness: There is no abdominal tenderness.   Neurological:      Mental Status: He is alert and oriented to person, place, and time. Mental status is at baseline.         Assessment & Plan    Elvis Masters is a 64 y.o. L handed  male with PMH significant for Alcohol use disorder, persistent A-fib (Eliquis), epilepsy,  KAYODE (noncompliant with CPAP), HFpEF, HTN, HLD, pre-DM, who presents as a transfer from  [facility] for uncontrollable seizures iso Alcohol withdrawal and epilepsy. Pt s/p extubation, however ongoing difficulties with regards to alcohol withdrawal and active delirium tremens.        #Epilepsy with breakthrough seizures iso alcohol withdrawal  #Alcohol withdrawal syndrome with delirium tremors  : : EEG 4/16: Severe encephalopathy, no seizures   : : Keppra 1500mg BID, Gabapentin 600mg QID   - Seizure and fall precautions       Randy Rodrigues MD

## 2025-04-18 NOTE — PROGRESS NOTES
"    [facility]  NEUROSCIENCE INTENSIVE CARE UNIT  DAILY PROGRESS NOTE       Patient Name: Elvis Masters   MRN: [id number]     Admit Date: 2025     : 1960 AGE: 64 y.o. GENDER: male        Subjective    64 year-old man with past medical history of hypertension, hyperlipidemia, atrial fibrillation (on Eliquis), HFpEF, alcohol use disorder, seizure disorder (on Keppra) presented on 4/15/2025 with EtOH withdraw and seizure. Per chart review, patient had GTC witnessed by mother lasting ~10 minutes. He subsequently had three episodes of seizure-like activity at [facility] and was loaded with 4.5g Keppra. He was transferred to [facility]U for cvEEG monitoring.    Significant Events:  - 4/15: Intubated at OSH for prolonged postictal status and for airway protection  - : Transferred to [facility] for cvEEG monitoring    Interval Events:   Overnight agitated and hallucinating saying, \"the goons and goblins are getting him, nursing is stabbing him and cutting his legs off.\" Received 1 dose phenobarbital PRN.    This morning he is telling me there is a \"pyramid man\" in the corner of his room. Denies any chest pain, SOB, weakness, numbness or tingling anywhere. Knows that he's in the hospital and needs phenobarbital.     Objective   VITALS (24H):  Temp:  [35.7 °C (96.3 °F)-37.5 °C (99.5 °F)] 37.5 °C (99.5 °F)  Heart Rate:  [] 110  Resp:  [10-25] 13  BP: (101-156)/() 139/85  FiO2 (%):  [50 %] 50 %  INTAKE/OUTPUT:  Intake/Output Summary (Last 24 hours) at 2025 0725  Last data filed at 2025 0650  Gross per 24 hour   Intake 1100 ml   Output 1930 ml   Net -830 ml     VENT SETTINGS:  Vent Mode: Volume control/assist control  FiO2 (%):  [50 %] 50 %  S RR:  [16] 16  S VT:  [500 mL] 500 mL  PEEP/CPAP (cm H2O):  [10 cm H20] 10 cm H20  MAP (cm H2O):  [14-15] 15     PHYSICAL EXAM:  NEURO:  - ANOx2 to self and place, incorrect month and year, knows he's in the hospital for withdrawal  - Follows " commands  - EOV, pupils 2mm equal and reactive bilaterally, EOMI, symmetric smile  - SANCHEZ spontaneously/AG, BUE>BLE  CV:  - RRR on telemetry, NSR  RESP:  - intubated  - Oxygen: HFNC 50% 50L  :  - Indwelling henriquez catheter in place  GI:  - Abdomen distended, soft  SKIN:  - Intact    MEDICATIONS:  Scheduled: PRN: Continuous:   Scheduled Medications[1] PRN Medications[2] Continuous Medications[3]     LAB RESULTS:  Results from last 72 hours   Lab Units 04/18/25  0058 04/17/25  1156   GLUCOSE mg/dL 94 91   SODIUM mmol/L 137 134*   POTASSIUM mmol/L 3.3* 3.9   CHLORIDE mmol/L 100 97*   CO2 mmol/L 27 28   ANION GAP mmol/L 13 13   BUN mg/dL 10 12   CREATININE mg/dL 1.05 1.33*   EGFR mL/min/1.73m*2 80 60*   CALCIUM mg/dL 7.9* 8.1*   PHOSPHORUS mg/dL 2.8 3.3   ALBUMIN g/dL 3.2* 3.2*   MAGNESIUM mg/dL 1.97 2.27   POCT CALCIUM IONIZED (MMOL/L) IN BLOOD mmol/L 0.94* 0.99*      Results from last 72 hours   Lab Units 04/18/25  0058 04/17/25  1650 04/17/25  1156   WBC AUTO x10*3/uL 5.8 5.5 4.0*   NRBC AUTO /100 WBCs 0.0 0.0 0.0   RBC AUTO x10*6/uL 3.45* 3.50* 3.56*   HEMOGLOBIN g/dL 10.5* 10.6* 10.6*   HEMATOCRIT % 29.9* 32.8* 33.0*   MCV fL 87 94 93   MCH pg 30.4 30.3 29.8   MCHC g/dL 35.1 32.3 32.1   RDW % 15.4* 15.0* 15.1*   PLATELETS AUTO x10*3/uL 129* 130* 128*   NEUTROS PCT AUTO % 74.4  --  72.2   IG PCT AUTO % 0.9  --  0.3   LYMPHS PCT AUTO % 15.3  --  18.4   MONOS PCT AUTO % 7.0  --  5.8   EOS PCT AUTO % 2.1  --  2.8   BASOS PCT AUTO % 0.3  --  0.5   NEUTROS ABS x10*3/uL 4.34  --  2.87   IG AUTO x10*3/uL 0.05  --  0.01   LYMPHS ABS AUTO x10*3/uL 0.89*  --  0.73*   MONOS ABS AUTO x10*3/uL 0.41  --  0.23   EOS ABS AUTO x10*3/uL 0.12  --  0.11   BASOS ABS AUTO x10*3/uL 0.02  --  0.02        Assessment/Plan    Elvis Masters is a 64 y.o. L handed  male with PMH significant for Alcohol use disorder, persistent A-fib (Eliquis), epilepsy, KAYODE (noncompliant with CPAP), HFpEF, HTN, HLD, pre-DM, who [facility] as a [facility] from  [facility]  for uncontrollable seizures iso Alcohol withdrawal and epilepsy. Extubated to [facility] on 4/17. EEG negative for epileptiform seizures, this likely represents alcohol withdrawal. Continue on CIWA with phenobarbital taper. Will transfer to medicine for further care.    NEURO:  #Epilepsy with breakthrough seizures iso alcohol withdrawal  #Alcohol withdrawal syndrome with delirium tremors  Assessment:  - Neurologically: see exam above  - 4/15: s/p 4.5g Keppra load  - 4/5: Keppra 37  - EEG 4/16: Severe encephalopathy, no seizures  - Home AEDs: Keppra 1500mg BID, Gabapentin 600mg QID  Plan:  - NSU  - Neuro Checks: Q1H  - Sedation: Propofol and Fentanyl  - cvEEG  - 1.5g Keppra BID  - CIWA  - Phenobarbital taper, PRN valium  - Folic acid, MVI, 500 mg IV thiamine TID x3 days then transition to PO thiamine 100 mg daily  - Seizure and fall precautions   - PT/OT/SLP  - Consult medical toxicology    CARDIOVASCULAR:  #Atrial Fibrillation (on eliquis)  #HFpEF, HTN, HLD  #Tropinemia   Assessment:  - ECHO 4/15/25: EF 50-55%, moderate dilation of ascending aorta, LA normal, no PFO noted  Plan:  - Continue to monitor on telemetry  - BP goal: Normotension  - Hold eliquis, continue heparin gtt   - Resume once swallow evaluation done  - Monitor heparin assays  - Troponin 61 -> 75 -> 78   - Trend q4h until downtrending x2    RESPIRATORY:  #Acute hypoxemic respiratory failure 2/2 PNA  #KAYODE  Assessment:  - Intubated, 50% FiO2, 10 PEEP, RR 16  Plan:  - Continuous pulse oximetry   - O2 PRN to maintain SpO2 > 94%, wean as tolerated  - Ventilator bundle while intubated, Daily CPAP and weaning parameters while intubated, and Wean to extubate  - See ID section    RENAL/:  #JARVIS [resolved]  Assessment:  - Baseline BUN/Cr: 8/0.70 (2024)  Results from last 72 hours   Lab Units 04/18/25  0058 04/17/25  1156   BUN mg/dL 10 12   CREATININE mg/dL 1.05 1.33*       Plan:  - Monitor with daily RFP  - Maintain henriquez catheter for: critically ill patient who  need accurate urinary output measurements    FEN/GI:  #No active issues  Assessment:  - Last BM: PTA  - NGT placed  Plan:  - Monitor and replace electrolytes per protocol  - Diet: NPO   - Bowel Regimen: Miralax QD    ENDOCRINE:  #Pre-DM  Assessment:  Results from last 7 days   Lab Units 25  0355 25  0058 25  0019 25  2346 25  1924 25  1536 25  1156 25  1141 25  1049 25  0601 25  0510 04/15/25  0743 04/15/25  0540 04/15/25  0054   POCT GLUCOSE mg/dL 85  --  106* 70* 83 89  --  103*   < >  --   --   --    < >  --    GLUCOSE mg/dL  --  94  --   --   --   --  91  --   --  137* 90 125*  --  139*   SODIUM mmol/L  --  137  --   --   --   --  134*  --   --  136 136 135*  --  134*    < > = values in this interval not displayed.   - A1c 10/21/24: 5.7%  Plan:  - Accuchecks & ISS Q4H     HEMATOLOGY:  #Anemia  Assessment:  - Baseline Hgb: 13 ()  - Baseline Plts: 190 ()  Results from last 7 days   Lab Units 25  0058 25  16525  1156   HEMOGLOBIN g/dL 10.5* 10.6* 10.6*   HEMATOCRIT % 29.9* 32.8* 33.0*   PLATELETS AUTO x10*3/uL 129* 130* 128*     Plan:  - Continue to monitor with daily CBC and Coag panel    INFECTIOUS DISEASE:  #Left lower lobe pneumonia - likely 2/2 aspiration   Assessment:  Results from last 7 days   Lab Units 25  00525  16525  1156   WBC AUTO x10*3/uL 5.8 5.5 4.0*    - Temp (24hrs), Av.4 °C (97.6 °F), Min:35.7 °C (96.3 °F), Max:37.5 °C (99.5 °F)  - 4/15: Bcx NGTD  - 4/15: MRSA not detected  - : Resp cx shows Gram +  Plan:  - Continue to monitor for s/sx of infection  - Pan culture for temperature > 38.4 C  - Continue Zosyn (-), restart Vancomycin (-)    MUSCULOSKELETAL:  - No acute issues    SKIN:  - No acute issues  - Turns and skin care per NSU protocol    ACCESS:  - PIVs    PROPHYLAXIS:  - DVT Ppx: SCDs, plan to restart heparin gtt  - GI Ppx: Pantoprazole    RESTRAINTS:  I agree  with nursing assessment of the patient's need for restraints to protect the patient from injury and facilitate healing. The patient is unable to cooperate with the plan of care and at risk for disrupting critical therapy (i.e., removing medical devices, lines, tubes and/or dressings).  Please see order for specifics. Restraints can be removed when the patient is able to cooperate with plan of care and allow healing to occur, or the medical devices at risk are discontinued by the medical team.     Alcides Dodd MD  PGY2 Neurology  Neuroscience Intensive Care         [1] pantoprazole, 40 mg, oral, Daily before breakfast   Or  esomeprazole, 40 mg, nasoduodenal tube, Daily before breakfast   Or  pantoprazole, 40 mg, intravenous, Daily before breakfast  folic acid, 1 mg, oral, Daily  heparin, 4,000 Units, intravenous, Once  insulin lispro, 0-5 Units, subcutaneous, q4h  levETIRAcetam, 1,500 mg, nasogastric tube, BID  multivitamin with minerals, 1 tablet, oral, Daily  PHENobarbital, 97.5 mg, intravenous, q8h   Followed by  [START ON 4/19/2025] PHENobarbital, 65 mg, intravenous, q8h   Followed by  [START ON 4/21/2025] PHENobarbital, 32.5 mg, intravenous, q8h  piperacillin-tazobactam, 4.5 g, intravenous, q6h  polyethylene glycol, 17 g, oral, Daily  potassium chloride, 20 mEq, intravenous, q2h  [START ON 4/20/2025] thiamine, 100 mg, oral, Daily  thiamine, 500 mg, intravenous, q8h AURY     [2] PRN medications: albuterol, calcium gluconate, calcium gluconate, dextrose, dextrose, diazePAM, [Held by provider] diazePAM, glucagon, glucagon, glucagon, heparin, magnesium sulfate, magnesium sulfate, oxygen, PHENobarbital, potassium chloride CR **OR** potassium chloride, potassium chloride CR **OR** potassium chloride, potassium chloride, sennosides-docusate sodium, vancomycin  [3] heparin, 0-4,000 Units/hr, Last Rate: 1,400 Units/hr (04/18/25 0220)  sodium chloride 0.9%, 50 mL/hr, Last Rate: 50 mL/hr (04/17/25 1841)

## 2025-04-18 NOTE — HOSPITAL COURSE
63 yo M with hx of alcohol use disorder, persistent a-fib (on eliquis), seizure disorder (on keppra, unknown etiology), KAYODE (noncompliant with CPAP), HFpEF, HTN, HLD, pre-DM transferred from [facility] for uncontrollable seizures iso alcohol withdrawal. Admitted to [facility]. Extubated to [facility] on 4/17. Phenobarbital taper was continued but he required several pushes of phenobarbital and valium PRN. Medical toxicology consulted on 4/18, recommended reloading with Phenobarbital 800mg with PRN plan.  He was also found to have a MSSA PNA, sensitivites pending. Narrowed antibiotics to Zosyn. Social work informed team that patient was out of network at . Transfer request initiated for [facility] on 4/18. On morning of 4/19, patient's CIWA in 4-5 range.

## 2025-04-19 LAB
ALBUMIN SERPL BCP-MCNC: 3.2 G/DL (ref 3.4–5)
ANION GAP SERPL CALC-SCNC: 14 MMOL/L (ref 10–20)
APTT PPP: 30 SECONDS (ref 26–36)
BACTERIA BLD CULT: NORMAL
BACTERIA BLD CULT: NORMAL
BACTERIA SPEC RESP CULT: ABNORMAL
BACTERIA SPEC RESP CULT: ABNORMAL
BASOPHILS # BLD AUTO: 0.02 X10*3/UL (ref 0–0.1)
BASOPHILS NFR BLD AUTO: 0.4 %
BUN SERPL-MCNC: 6 MG/DL (ref 6–23)
CA-I BLD-SCNC: 1.05 MMOL/L (ref 1.1–1.33)
CALCIUM SERPL-MCNC: 7.8 MG/DL (ref 8.6–10.6)
CHLORIDE SERPL-SCNC: 100 MMOL/L (ref 98–107)
CO2 SERPL-SCNC: 29 MMOL/L (ref 21–32)
CREAT SERPL-MCNC: 0.91 MG/DL (ref 0.5–1.3)
EGFRCR SERPLBLD CKD-EPI 2021: >90 ML/MIN/1.73M*2
EOSINOPHIL # BLD AUTO: 0.17 X10*3/UL (ref 0–0.7)
EOSINOPHIL NFR BLD AUTO: 3.8 %
ERYTHROCYTE [DISTWIDTH] IN BLOOD BY AUTOMATED COUNT: 15.7 % (ref 11.5–14.5)
GLUCOSE BLD MANUAL STRIP-MCNC: 107 MG/DL (ref 74–99)
GLUCOSE BLD MANUAL STRIP-MCNC: 68 MG/DL (ref 74–99)
GLUCOSE BLD MANUAL STRIP-MCNC: 72 MG/DL (ref 74–99)
GLUCOSE BLD MANUAL STRIP-MCNC: 77 MG/DL (ref 74–99)
GLUCOSE BLD MANUAL STRIP-MCNC: 93 MG/DL (ref 74–99)
GLUCOSE BLD MANUAL STRIP-MCNC: 94 MG/DL (ref 74–99)
GLUCOSE SERPL-MCNC: 78 MG/DL (ref 74–99)
GRAM STN SPEC: ABNORMAL
GRAM STN SPEC: ABNORMAL
HCT VFR BLD AUTO: 33.1 % (ref 41–52)
HGB BLD-MCNC: 11 G/DL (ref 13.5–17.5)
IMM GRANULOCYTES # BLD AUTO: 0.03 X10*3/UL (ref 0–0.7)
IMM GRANULOCYTES NFR BLD AUTO: 0.7 % (ref 0–0.9)
INR PPP: 1.1 (ref 0.9–1.1)
LYMPHOCYTES # BLD AUTO: 1.02 X10*3/UL (ref 1.2–4.8)
LYMPHOCYTES NFR BLD AUTO: 22.6 %
MAGNESIUM SERPL-MCNC: 1.97 MG/DL (ref 1.6–2.4)
MCH RBC QN AUTO: 30.1 PG (ref 26–34)
MCHC RBC AUTO-ENTMCNC: 33.2 G/DL (ref 32–36)
MCV RBC AUTO: 90 FL (ref 80–100)
MONOCYTES # BLD AUTO: 0.57 X10*3/UL (ref 0.1–1)
MONOCYTES NFR BLD AUTO: 12.6 %
NEUTROPHILS # BLD AUTO: 2.7 X10*3/UL (ref 1.2–7.7)
NEUTROPHILS NFR BLD AUTO: 59.9 %
NRBC BLD-RTO: 0 /100 WBCS (ref 0–0)
PHOSPHATE SERPL-MCNC: 3.2 MG/DL (ref 2.5–4.9)
PLATELET # BLD AUTO: 156 X10*3/UL (ref 150–450)
POTASSIUM SERPL-SCNC: 3 MMOL/L (ref 3.5–5.3)
PROTHROMBIN TIME: 12.6 SECONDS (ref 9.8–12.4)
RBC # BLD AUTO: 3.66 X10*6/UL (ref 4.5–5.9)
SODIUM SERPL-SCNC: 140 MMOL/L (ref 136–145)
WBC # BLD AUTO: 4.5 X10*3/UL (ref 4.4–11.3)

## 2025-04-19 PROCEDURE — 97161 PT EVAL LOW COMPLEX 20 MIN: CPT | Mod: GP | Performed by: STUDENT IN AN ORGANIZED HEALTH CARE EDUCATION/TRAINING PROGRAM

## 2025-04-19 PROCEDURE — 99222 1ST HOSP IP/OBS MODERATE 55: CPT | Performed by: EMERGENCY MEDICINE

## 2025-04-19 PROCEDURE — 82330 ASSAY OF CALCIUM: CPT

## 2025-04-19 PROCEDURE — 83735 ASSAY OF MAGNESIUM: CPT

## 2025-04-19 PROCEDURE — 2500000001 HC RX 250 WO HCPCS SELF ADMINISTERED DRUGS (ALT 637 FOR MEDICARE OP)

## 2025-04-19 PROCEDURE — 36415 COLL VENOUS BLD VENIPUNCTURE: CPT

## 2025-04-19 PROCEDURE — 82947 ASSAY GLUCOSE BLOOD QUANT: CPT

## 2025-04-19 PROCEDURE — 2500000001 HC RX 250 WO HCPCS SELF ADMINISTERED DRUGS (ALT 637 FOR MEDICARE OP): Performed by: STUDENT IN AN ORGANIZED HEALTH CARE EDUCATION/TRAINING PROGRAM

## 2025-04-19 PROCEDURE — 2500000002 HC RX 250 W HCPCS SELF ADMINISTERED DRUGS (ALT 637 FOR MEDICARE OP, ALT 636 FOR OP/ED): Performed by: REGISTERED NURSE

## 2025-04-19 PROCEDURE — 85610 PROTHROMBIN TIME: CPT | Performed by: STUDENT IN AN ORGANIZED HEALTH CARE EDUCATION/TRAINING PROGRAM

## 2025-04-19 PROCEDURE — 2500000004 HC RX 250 GENERAL PHARMACY W/ HCPCS (ALT 636 FOR OP/ED): Mod: JZ | Performed by: STUDENT IN AN ORGANIZED HEALTH CARE EDUCATION/TRAINING PROGRAM

## 2025-04-19 PROCEDURE — 99232 SBSQ HOSP IP/OBS MODERATE 35: CPT

## 2025-04-19 PROCEDURE — 1210000001 HC SEMI-PRIVATE ROOM DAILY

## 2025-04-19 PROCEDURE — 85025 COMPLETE CBC W/AUTO DIFF WBC: CPT

## 2025-04-19 PROCEDURE — 2500000004 HC RX 250 GENERAL PHARMACY W/ HCPCS (ALT 636 FOR OP/ED)

## 2025-04-19 PROCEDURE — 80069 RENAL FUNCTION PANEL: CPT

## 2025-04-19 PROCEDURE — 2500000004 HC RX 250 GENERAL PHARMACY W/ HCPCS (ALT 636 FOR OP/ED): Performed by: STUDENT IN AN ORGANIZED HEALTH CARE EDUCATION/TRAINING PROGRAM

## 2025-04-19 PROCEDURE — 2500000004 HC RX 250 GENERAL PHARMACY W/ HCPCS (ALT 636 FOR OP/ED): Mod: JZ

## 2025-04-19 RX ORDER — LEVETIRACETAM 750 MG/1
1500 TABLET ORAL 2 TIMES DAILY
Status: CANCELLED
Start: 2025-04-19

## 2025-04-19 RX ORDER — PHENOBARBITAL 32.4 MG/1
32.4 TABLET ORAL 3 TIMES DAILY
Status: DISCONTINUED | OUTPATIENT
Start: 2025-04-23 | End: 2025-04-19

## 2025-04-19 RX ORDER — POTASSIUM CHLORIDE 1.5 G/1.58G
40 POWDER, FOR SOLUTION ORAL EVERY 6 HOURS PRN
Status: DISCONTINUED | OUTPATIENT
Start: 2025-04-19 | End: 2025-04-19

## 2025-04-19 RX ORDER — LORAZEPAM 2 MG/ML
1 INJECTION INTRAMUSCULAR EVERY 2 HOUR PRN
Status: DISCONTINUED | OUTPATIENT
Start: 2025-04-19 | End: 2025-04-20

## 2025-04-19 RX ORDER — PHENOBARBITAL 32.4 MG/1
64.8 TABLET ORAL 3 TIMES DAILY
Status: CANCELLED | OUTPATIENT
Start: 2025-04-21 | End: 2025-04-23

## 2025-04-19 RX ORDER — POTASSIUM CHLORIDE 20 MEQ/1
40 TABLET, EXTENDED RELEASE ORAL ONCE
Status: COMPLETED | OUTPATIENT
Start: 2025-04-19 | End: 2025-04-19

## 2025-04-19 RX ORDER — FOLIC ACID 1 MG/1
1 TABLET ORAL DAILY
Status: CANCELLED
Start: 2025-04-20 | End: 2025-06-19

## 2025-04-19 RX ORDER — MAGNESIUM SULFATE HEPTAHYDRATE 40 MG/ML
2 INJECTION, SOLUTION INTRAVENOUS EVERY 6 HOURS PRN
Status: DISCONTINUED | OUTPATIENT
Start: 2025-04-19 | End: 2025-04-20 | Stop reason: HOSPADM

## 2025-04-19 RX ORDER — MULTIVIT-MIN/IRON FUM/FOLIC AC 7.5 MG-4
1 TABLET ORAL DAILY
Status: CANCELLED
Start: 2025-04-20

## 2025-04-19 RX ORDER — PHENOBARBITAL 32.4 MG/1
64.8 TABLET ORAL 3 TIMES DAILY
Status: DISCONTINUED | OUTPATIENT
Start: 2025-04-21 | End: 2025-04-19

## 2025-04-19 RX ORDER — PHENOBARBITAL 32.4 MG/1
32.4 TABLET ORAL 3 TIMES DAILY
Status: CANCELLED | OUTPATIENT
Start: 2025-04-23 | End: 2025-04-25

## 2025-04-19 RX ORDER — POTASSIUM CHLORIDE 20 MEQ/1
20 TABLET, EXTENDED RELEASE ORAL EVERY 6 HOURS PRN
Status: DISCONTINUED | OUTPATIENT
Start: 2025-04-19 | End: 2025-04-19

## 2025-04-19 RX ORDER — LORAZEPAM 2 MG/ML
0.5 INJECTION INTRAMUSCULAR EVERY 2 HOUR PRN
Status: DISCONTINUED | OUTPATIENT
Start: 2025-04-19 | End: 2025-04-20

## 2025-04-19 RX ORDER — PHENOBARBITAL 32.4 MG/1
65 TABLET ORAL EVERY 6 HOURS PRN
Status: CANCELLED | OUTPATIENT
Start: 2025-04-19

## 2025-04-19 RX ORDER — PHENOBARBITAL 32.4 MG/1
97.2 TABLET ORAL 3 TIMES DAILY
Status: DISCONTINUED | OUTPATIENT
Start: 2025-04-19 | End: 2025-04-19

## 2025-04-19 RX ORDER — POTASSIUM CHLORIDE 1.5 G/1.58G
40 POWDER, FOR SOLUTION ORAL ONCE
Status: COMPLETED | OUTPATIENT
Start: 2025-04-19 | End: 2025-04-19

## 2025-04-19 RX ORDER — POTASSIUM CHLORIDE 1.5 G/1.58G
20 POWDER, FOR SOLUTION ORAL EVERY 6 HOURS PRN
Status: DISCONTINUED | OUTPATIENT
Start: 2025-04-19 | End: 2025-04-19

## 2025-04-19 RX ORDER — MAGNESIUM SULFATE HEPTAHYDRATE 40 MG/ML
2 INJECTION, SOLUTION INTRAVENOUS ONCE
Status: DISCONTINUED | OUTPATIENT
Start: 2025-04-19 | End: 2025-04-20 | Stop reason: HOSPADM

## 2025-04-19 RX ORDER — LANOLIN ALCOHOL/MO/W.PET/CERES
100 CREAM (GRAM) TOPICAL DAILY
Status: CANCELLED
Start: 2025-04-20 | End: 2026-04-20

## 2025-04-19 RX ORDER — POTASSIUM CHLORIDE 20 MEQ/1
40 TABLET, EXTENDED RELEASE ORAL EVERY 6 HOURS PRN
Status: DISCONTINUED | OUTPATIENT
Start: 2025-04-19 | End: 2025-04-19

## 2025-04-19 RX ORDER — MULTIVIT-MIN/IRON FUM/FOLIC AC 7.5 MG-4
1 TABLET ORAL DAILY
Status: CANCELLED | OUTPATIENT
Start: 2025-04-19

## 2025-04-19 RX ORDER — FOLIC ACID 1 MG/1
1 TABLET ORAL DAILY
Status: CANCELLED | OUTPATIENT
Start: 2025-04-19

## 2025-04-19 RX ORDER — PHENOBARBITAL 32.4 MG/1
97.2 TABLET ORAL 3 TIMES DAILY
Status: CANCELLED | OUTPATIENT
Start: 2025-04-19 | End: 2025-04-21

## 2025-04-19 RX ORDER — LEVETIRACETAM 500 MG/1
1500 TABLET ORAL 2 TIMES DAILY
Status: DISCONTINUED | OUTPATIENT
Start: 2025-04-20 | End: 2025-04-20 | Stop reason: HOSPADM

## 2025-04-19 RX ORDER — POTASSIUM CHLORIDE 20 MEQ/1
20 TABLET, EXTENDED RELEASE ORAL ONCE
Status: DISCONTINUED | OUTPATIENT
Start: 2025-04-19 | End: 2025-04-19

## 2025-04-19 RX ORDER — LORAZEPAM 2 MG/ML
2 INJECTION INTRAMUSCULAR EVERY 2 HOUR PRN
Status: DISCONTINUED | OUTPATIENT
Start: 2025-04-19 | End: 2025-04-20

## 2025-04-19 RX ADMIN — THIAMINE HYDROCHLORIDE 500 MG: 100 INJECTION, SOLUTION INTRAMUSCULAR; INTRAVENOUS at 06:05

## 2025-04-19 RX ADMIN — PIPERACILLIN SODIUM AND TAZOBACTAM SODIUM 4.5 G: 4; .5 INJECTION, SOLUTION INTRAVENOUS at 13:09

## 2025-04-19 RX ADMIN — PIPERACILLIN SODIUM AND TAZOBACTAM SODIUM 4.5 G: 4; .5 INJECTION, SOLUTION INTRAVENOUS at 00:31

## 2025-04-19 RX ADMIN — PIPERACILLIN SODIUM AND TAZOBACTAM SODIUM 4.5 G: 4; .5 INJECTION, SOLUTION INTRAVENOUS at 06:05

## 2025-04-19 RX ADMIN — POTASSIUM CHLORIDE 40 MEQ: 1500 TABLET, EXTENDED RELEASE ORAL at 05:19

## 2025-04-19 RX ADMIN — PIPERACILLIN SODIUM AND TAZOBACTAM SODIUM 4.5 G: 4; .5 INJECTION, SOLUTION INTRAVENOUS at 18:40

## 2025-04-19 RX ADMIN — LEVETIRACETAM 1500 MG: 500 TABLET, FILM COATED ORAL at 08:37

## 2025-04-19 RX ADMIN — APIXABAN 5 MG: 5 TABLET, FILM COATED ORAL at 08:37

## 2025-04-19 RX ADMIN — VANCOMYCIN HYDROCHLORIDE 1500 MG: 1.5 INJECTION, POWDER, LYOPHILIZED, FOR SOLUTION INTRAVENOUS at 09:21

## 2025-04-19 RX ADMIN — CALCIUM GLUCONATE 1 G: 20 INJECTION, SOLUTION INTRAVENOUS at 04:24

## 2025-04-19 RX ADMIN — Medication 1 TABLET: at 08:36

## 2025-04-19 RX ADMIN — PANTOPRAZOLE SODIUM 40 MG: 40 INJECTION, POWDER, FOR SOLUTION INTRAVENOUS at 08:39

## 2025-04-19 RX ADMIN — LEVETIRACETAM 1500 MG: 500 TABLET, FILM COATED ORAL at 20:37

## 2025-04-19 RX ADMIN — PHENOBARBITAL SODIUM 65 MG: 130 INJECTION INTRAMUSCULAR; INTRAVENOUS at 08:37

## 2025-04-19 RX ADMIN — POTASSIUM CHLORIDE 40 MEQ: 1.5 POWDER, FOR SOLUTION ORAL at 08:37

## 2025-04-19 RX ADMIN — THIAMINE HYDROCHLORIDE 500 MG: 100 INJECTION, SOLUTION INTRAMUSCULAR; INTRAVENOUS at 13:09

## 2025-04-19 RX ADMIN — POLYETHYLENE GLYCOL 3350 17 G: 17 POWDER, FOR SOLUTION ORAL at 08:37

## 2025-04-19 RX ADMIN — THIAMINE HYDROCHLORIDE 500 MG: 100 INJECTION, SOLUTION INTRAMUSCULAR; INTRAVENOUS at 22:36

## 2025-04-19 RX ADMIN — MAGNESIUM SULFATE HEPTAHYDRATE 2 G: 40 INJECTION, SOLUTION INTRAVENOUS at 04:24

## 2025-04-19 RX ADMIN — APIXABAN 5 MG: 5 TABLET, FILM COATED ORAL at 20:37

## 2025-04-19 RX ADMIN — FOLIC ACID 1 MG: 1 TABLET ORAL at 08:37

## 2025-04-19 ASSESSMENT — LIFESTYLE VARIABLES
VISUAL DISTURBANCES: NOT PRESENT
HEADACHE, FULLNESS IN HEAD: NOT PRESENT
ORIENTATION AND CLOUDING OF SENSORIUM: DISORIENTED FOR DATE BY MORE THAN 2 CALENDAR DAYS
ORIENTATION AND CLOUDING OF SENSORIUM: DISORIENTED FOR PLACE OR PERSON
HEADACHE, FULLNESS IN HEAD: NOT PRESENT
PAROXYSMAL SWEATS: NO SWEAT VISIBLE
AUDITORY DISTURBANCES: NOT PRESENT
AGITATION: NORMAL ACTIVITY
AUDITORY DISTURBANCES: NOT PRESENT
NAUSEA AND VOMITING: NO NAUSEA AND NO VOMITING
HEADACHE, FULLNESS IN HEAD: NOT PRESENT
VISUAL DISTURBANCES: NOT PRESENT
HEADACHE, FULLNESS IN HEAD: NOT PRESENT
ANXIETY: NO ANXIETY, AT EASE
HEADACHE, FULLNESS IN HEAD: NOT PRESENT
AUDITORY DISTURBANCES: NOT PRESENT
VISUAL DISTURBANCES: NOT PRESENT
ANXIETY: NO ANXIETY, AT EASE
TOTAL SCORE: 4
TREMOR: NO TREMOR
TREMOR: NOT VISIBLE, BUT CAN BE FELT FINGERTIP TO FINGERTIP
AGITATION: NORMAL ACTIVITY
AGITATION: NORMAL ACTIVITY
ANXIETY: NO ANXIETY, AT EASE
NAUSEA AND VOMITING: NO NAUSEA AND NO VOMITING
ORIENTATION AND CLOUDING OF SENSORIUM: DISORIENTED FOR DATE BY NO MORE THAN 2 CALENDAR DAYS
ANXIETY: NO ANXIETY, AT EASE
ORIENTATION AND CLOUDING OF SENSORIUM: DISORIENTED FOR PLACE OR PERSON
NAUSEA AND VOMITING: NO NAUSEA AND NO VOMITING
TOTAL SCORE: 3
TOTAL SCORE: 4
TREMOR: NO TREMOR
AGITATION: NORMAL ACTIVITY
TREMOR: NO TREMOR
AUDITORY DISTURBANCES: NOT PRESENT
HEADACHE, FULLNESS IN HEAD: NOT PRESENT
TOTAL SCORE: 2
TOTAL SCORE: 4
PAROXYSMAL SWEATS: NO SWEAT VISIBLE
AGITATION: NORMAL ACTIVITY
AGITATION: NORMAL ACTIVITY
TOTAL SCORE: 5
PAROXYSMAL SWEATS: BARELY PERCEPTIBLE SWEATING, PALMS MOIST
PAROXYSMAL SWEATS: NO SWEAT VISIBLE
ORIENTATION AND CLOUDING OF SENSORIUM: DISORIENTED FOR DATE BY NO MORE THAN 2 CALENDAR DAYS
ANXIETY: NO ANXIETY, AT EASE
AUDITORY DISTURBANCES: NOT PRESENT
VISUAL DISTURBANCES: NOT PRESENT
NAUSEA AND VOMITING: NO NAUSEA AND NO VOMITING
ANXIETY: NO ANXIETY, AT EASE
VISUAL DISTURBANCES: NOT PRESENT
AUDITORY DISTURBANCES: NOT PRESENT
HEADACHE, FULLNESS IN HEAD: NOT PRESENT
AGITATION: NORMAL ACTIVITY
AGITATION: NORMAL ACTIVITY
NAUSEA AND VOMITING: NO NAUSEA AND NO VOMITING
AGITATION: NORMAL ACTIVITY
VISUAL DISTURBANCES: NOT PRESENT
ORIENTATION AND CLOUDING OF SENSORIUM: ORIENTED AND CAN DO SERIAL ADDITIONS
TREMOR: NO TREMOR
AGITATION: NORMAL ACTIVITY
PAROXYSMAL SWEATS: NO SWEAT VISIBLE
ANXIETY: NO ANXIETY, AT EASE
AUDITORY DISTURBANCES: NOT PRESENT
ORIENTATION AND CLOUDING OF SENSORIUM: ORIENTED AND CAN DO SERIAL ADDITIONS
ORIENTATION AND CLOUDING OF SENSORIUM: DISORIENTED FOR DATE BY MORE THAN 2 CALENDAR DAYS
PAROXYSMAL SWEATS: NO SWEAT VISIBLE
VISUAL DISTURBANCES: NOT PRESENT
ORIENTATION AND CLOUDING OF SENSORIUM: DISORIENTED FOR DATE BY NO MORE THAN 2 CALENDAR DAYS
PAROXYSMAL SWEATS: NO SWEAT VISIBLE
HEADACHE, FULLNESS IN HEAD: NOT PRESENT
VISUAL DISTURBANCES: NOT PRESENT
AUDITORY DISTURBANCES: NOT PRESENT
ORIENTATION AND CLOUDING OF SENSORIUM: DISORIENTED FOR PLACE OR PERSON
ANXIETY: NO ANXIETY, AT EASE
TREMOR: NO TREMOR
NAUSEA AND VOMITING: NO NAUSEA AND NO VOMITING
NAUSEA AND VOMITING: NO NAUSEA AND NO VOMITING
ANXIETY: NO ANXIETY, AT EASE
TOTAL SCORE: 3
TOTAL SCORE: 2
HEADACHE, FULLNESS IN HEAD: NOT PRESENT
AUDITORY DISTURBANCES: NOT PRESENT
PAROXYSMAL SWEATS: NO SWEAT VISIBLE
VISUAL DISTURBANCES: NOT PRESENT
TREMOR: NO TREMOR
ANXIETY: NO ANXIETY, AT EASE
PAROXYSMAL SWEATS: NO SWEAT VISIBLE
HEADACHE, FULLNESS IN HEAD: NOT PRESENT
TOTAL SCORE: 0
NAUSEA AND VOMITING: NO NAUSEA AND NO VOMITING
AUDITORY DISTURBANCES: NOT PRESENT
TREMOR: NO TREMOR
TOTAL SCORE: 2
NAUSEA AND VOMITING: NO NAUSEA AND NO VOMITING
VISUAL DISTURBANCES: NOT PRESENT
TREMOR: 3
PAROXYSMAL SWEATS: NO SWEAT VISIBLE
TREMOR: NO TREMOR
NAUSEA AND VOMITING: NO NAUSEA AND NO VOMITING

## 2025-04-19 ASSESSMENT — COGNITIVE AND FUNCTIONAL STATUS - GENERAL
DRESSING REGULAR UPPER BODY CLOTHING: A LOT
HELP NEEDED FOR BATHING: A LOT
MOVING TO AND FROM BED TO CHAIR: A LITTLE
STANDING UP FROM CHAIR USING ARMS: A LITTLE
MOVING FROM LYING ON BACK TO SITTING ON SIDE OF FLAT BED WITH BEDRAILS: A LITTLE
EATING MEALS: A LITTLE
MOVING TO AND FROM BED TO CHAIR: A LOT
TURNING FROM BACK TO SIDE WHILE IN FLAT BAD: A LOT
DRESSING REGULAR LOWER BODY CLOTHING: A LITTLE
CLIMB 3 TO 5 STEPS WITH RAILING: TOTAL
STANDING UP FROM CHAIR USING ARMS: A LOT
PERSONAL GROOMING: A LOT
CLIMB 3 TO 5 STEPS WITH RAILING: A LOT
MOBILITY SCORE: 13
WALKING IN HOSPITAL ROOM: A LOT
WALKING IN HOSPITAL ROOM: TOTAL
DAILY ACTIVITIY SCORE: 14
TURNING FROM BACK TO SIDE WHILE IN FLAT BAD: A LOT
MOVING FROM LYING ON BACK TO SITTING ON SIDE OF FLAT BED WITH BEDRAILS: A LITTLE
TOILETING: A LOT
MOBILITY SCORE: 13

## 2025-04-19 ASSESSMENT — PAIN SCALES - GENERAL
PAINLEVEL_OUTOF10: 0 - NO PAIN

## 2025-04-19 ASSESSMENT — ACTIVITIES OF DAILY LIVING (ADL): ADL_ASSISTANCE: INDEPENDENT

## 2025-04-19 ASSESSMENT — PAIN - FUNCTIONAL ASSESSMENT
PAIN_FUNCTIONAL_ASSESSMENT: 0-10
PAIN_FUNCTIONAL_ASSESSMENT: 0-10

## 2025-04-19 NOTE — NURSING NOTE
[facility]. Lethargic but easily arousable. Oriented to person. No belongings noted.

## 2025-04-19 NOTE — PROGRESS NOTES
Elvis Masters is a 64 y.o. male on day 2 of admission presenting with Seizure (Multi).    Medicine consulted for transfer to medicine.     Subjective   Pt accepted for transfer on 4/18, however on further review with CIWA persistently >20 he was not deemed stable to leave the [facility]. CIWA's throughout the day have been 2-5.     On exam this afternoon, pt somnolent, answering questions with one word. Cannot tell me where he is or what month it is.        Objective     Last Recorded Vitals  /87   Pulse 80   Temp 36.2 °C (97.2 °F)   Resp 11   Wt (!) 170 kg (374 lb 12.5 oz)   SpO2 100%   Intake/Output last 3 Shifts:    Intake/Output Summary (Last 24 hours) at 4/19/2025 1353  Last data filed at 4/19/2025 1029  Gross per 24 hour   Intake 1500 ml   Output 1251 ml   Net 249 ml       Admission Weight  Weight: (!) 160 kg (351 lb 10.1 oz) (04/17/25 0932)    Daily Weight  04/18/25 : (!) 170 kg (374 lb 12.5 oz)    Image Results  EEG  IMPRESSION    Impression  This vEEG is normal. No epileptiform discharges or lateralizing signs are recorded. Overall, the degree of encephalopathy has significantly improved compared to previous recording.    A full report will be scanned into the patient's chart at a later time.    This report has been interpreted and electronically signed by      Physical Exam  Constitutional:       General: He is not in acute distress.     Appearance: He is ill-appearing.   HENT:      Head: Normocephalic and atraumatic.      Mouth/Throat:      Mouth: Mucous membranes are moist.      Pharynx: No oropharyngeal exudate or posterior oropharyngeal erythema.   Eyes:      General: No scleral icterus.     Pupils: Pupils are equal, round, and reactive to light.   Cardiovascular:      Rate and Rhythm: Normal rate and regular rhythm.      Heart sounds: No murmur heard.     No friction rub. No gallop.   Pulmonary:      Effort: Pulmonary effort is normal. No respiratory distress.      Breath sounds: Normal breath  sounds.   Abdominal:      General: Abdomen is flat. Bowel sounds are normal. There is no distension.      Palpations: Abdomen is soft.      Tenderness: There is no abdominal tenderness.   Musculoskeletal:         General: No swelling or deformity.      Right lower leg: No edema.      Left lower leg: No edema.   Skin:     General: Skin is warm and dry.      Coloration: Skin is not jaundiced.      Findings: No erythema or rash.   Neurological:      General: No focal deficit present.      Mental Status: He is disoriented.         Relevant Results  Results for orders placed or performed during the hospital encounter of 04/17/25 (from the past 24 hours)   POCT GLUCOSE   Result Value Ref Range    POCT Glucose 97 74 - 99 mg/dL   Troponin I, High Sensitivity   Result Value Ref Range    Troponin I, High Sensitivity (CMC) 52 0 - 53 ng/L   Vancomycin   Result Value Ref Range    Vancomycin 8.8 5.0 - 20.0 ug/mL   POCT GLUCOSE   Result Value Ref Range    POCT Glucose 81 74 - 99 mg/dL   POCT GLUCOSE   Result Value Ref Range    POCT Glucose 71 (L) 74 - 99 mg/dL   Coagulation Screen   Result Value Ref Range    Protime 12.6 (H) 9.8 - 12.4 seconds    INR 1.1 0.9 - 1.1    aPTT 30 26 - 36 seconds   Calcium, ionized   Result Value Ref Range    POCT Calcium, Ionized 1.05 (L) 1.1 - 1.33 mmol/L   CBC and Auto Differential   Result Value Ref Range    WBC 4.5 4.4 - 11.3 x10*3/uL    nRBC 0.0 0.0 - 0.0 /100 WBCs    RBC 3.66 (L) 4.50 - 5.90 x10*6/uL    Hemoglobin 11.0 (L) 13.5 - 17.5 g/dL    Hematocrit 33.1 (L) 41.0 - 52.0 %    MCV 90 80 - 100 fL    MCH 30.1 26.0 - 34.0 pg    MCHC 33.2 32.0 - 36.0 g/dL    RDW 15.7 (H) 11.5 - 14.5 %    Platelets 156 150 - 450 x10*3/uL    Neutrophils % 59.9 40.0 - 80.0 %    Immature Granulocytes %, Automated 0.7 0.0 - 0.9 %    Lymphocytes % 22.6 13.0 - 44.0 %    Monocytes % 12.6 2.0 - 10.0 %    Eosinophils % 3.8 0.0 - 6.0 %    Basophils % 0.4 0.0 - 2.0 %    Neutrophils Absolute 2.70 1.20 - 7.70 x10*3/uL     Immature Granulocytes Absolute, Automated 0.03 0.00 - 0.70 x10*3/uL    Lymphocytes Absolute 1.02 (L) 1.20 - 4.80 x10*3/uL    Monocytes Absolute 0.57 0.10 - 1.00 x10*3/uL    Eosinophils Absolute 0.17 0.00 - 0.70 x10*3/uL    Basophils Absolute 0.02 0.00 - 0.10 x10*3/uL   Magnesium   Result Value Ref Range    Magnesium 1.97 1.60 - 2.40 mg/dL   Renal function panel   Result Value Ref Range    Glucose 78 74 - 99 mg/dL    Sodium 140 136 - 145 mmol/L    Potassium 3.0 (L) 3.5 - 5.3 mmol/L    Chloride 100 98 - 107 mmol/L    Bicarbonate 29 21 - 32 mmol/L    Anion Gap 14 10 - 20 mmol/L    Urea Nitrogen 6 6 - 23 mg/dL    Creatinine 0.91 0.50 - 1.30 mg/dL    eGFR >90 >60 mL/min/1.73m*2    Calcium 7.8 (L) 8.6 - 10.6 mg/dL    Phosphorus 3.2 2.5 - 4.9 mg/dL    Albumin 3.2 (L) 3.4 - 5.0 g/dL   POCT GLUCOSE   Result Value Ref Range    POCT Glucose 77 74 - 99 mg/dL   POCT GLUCOSE   Result Value Ref Range    POCT Glucose 94 74 - 99 mg/dL   POCT GLUCOSE   Result Value Ref Range    POCT Glucose 93 74 - 99 mg/dL         Assessment & Plan    Elvis Masters is a 64 y.o. L handed  male with PMH significant for Alcohol use disorder, persistent A-fib (Eliquis), epilepsy,  KAYODE (noncompliant with CPAP), HFpEF, HTN, HLD, pre-DM, who presents as a transfer from  [facility] for uncontrollable seizures iso Alcohol withdrawal and epilepsy. Pt s/p extubation, completed a phenobarb taper with CIWAs now consistently <10. Medicine has been consulted for transfer to our service.      Recommendations     -Accepted to medicine while pending transfer to [facility]  -Received phenobarb x1, continue PRN phenobarb per CIWA protocol. Appreciate recs from med tox.   -Pt getting zosyn for LLL likely aspiration pneumonia; would be acceptable to switch to PO Augmentin to complete course          Piper Bryant MD  PGY-3

## 2025-04-19 NOTE — PROGRESS NOTES
Physical Therapy    Physical Therapy Evaluation    Patient Name: Elvis Masters  MRN: [id number]  Room: [facility]  Today's Date: 4/19/2025   Time Calculation  Start Time: 1050  Stop Time: 1108  Time Calculation (min): 18 min    Assessment/Plan   PT Assessment  PT Assessment Results: Decreased strength, Decreased endurance, Impaired balance, Decreased mobility  Rehab Prognosis: Good  Barriers to Discharge Home: Physical needs, Caregiver assistance  Caregiver Assistance: Caregiver assistance needed per identified barriers - however, level of patient's required assistance exceeds assistance available at home  Physical Needs: Stair navigation into home limited by function/safety, In-home setup navigation limited by function/safety, Intermittent mobility assistance needed, Intermittent ADL assistance needed  End of Session Communication: Bedside nurse  End of Session Patient Position: Bed, 3 rail up, Alarm off, not on at start of session  IP OR SWING BED PT PLAN  Inpatient or Swing Bed: Inpatient  Treatment/Interventions: Bed mobility, Transfer training, Gait training, Stair training, Balance training, Strengthening, Range of motion, Therapeutic exercise, Therapeutic activity, Endurance training  PT Plan: Ongoing PT  PT Frequency: 3 times per week  PT Discharge Recommendations: Moderate intensity level of continued care  PT Recommended Transfer Status: Assist x1, Assistive device    Subjective      General Visit Information:  Subjective: Patient is alert, agreeable to PT.  Improved interaction and alertness with continued stimulation, noted incontinent of bowels at start of session.  Reason for Referral: ETOH withdrawal and seizure  Past Medical History Relevant to Rehab: l history of hypertension, hyperlipidemia, atrial fibrillation (on Eliquis), HFpEF, alcohol use disorder, seizure disorder (on Keppra)  Prior to Session Communication: Bedside nurse  Patient Position Received: Bed, 3 rail up, Alarm off, not on at start of  session  Family/Caregiver Present: No   Home Living:  Home Living  Type of Home: House  Lives With: Parent(s)  Home Adaptive Equipment: None  Home Layout: Stairs to alternate level with rails  Alternate Level Stairs-Number of Steps: 12  Home Access: Stairs to enter without rails  Entrance Stairs-Number of Steps: 3  Prior Level of Function:  Prior Function Per Pt/Caregiver Report  Level of Martin: Independent with ADLs and functional transfers  ADL Assistance: Independent  Homemaking Assistance: Independent  Ambulatory Assistance: Independent  Precautions:  Precautions  Medical Precautions: Fall precautions, Seizure precautions  Vital Signs:  Pre Vitals  Vital Signs  Heart Rate: 81  SpO2: 99 %  BP: 143/88   Post Vitals  Vital Signs  Heart Rate: 85  SpO2: 99 %  BP: 159/88   Lines/Tubes/Drains:  External Urinary Catheter Male (Active)   Number of days: 1        Continuous Medications/Drips:  Continuous Medications[1]    Objective   Pain:  Pain Assessment  0-10 (Numeric) Pain Score:  (unrated generalized 'spinal' arthritic baseline pain)    Cognition:  Cognition  Orientation Level: Oriented X4    General Assessments:  Extremity/Trunk Assessments:  Tone: No abnormalities noted  Sensation  Light Touch: No apparent deficits  Coordination  Movements are Fluid and Coordinated: Yes  Upper Extremity  ROM: Impaired: L: baseline GH impairment, 0-30 AROM  Strength:Not WFLRUE: GH Flexion 3-/5, Elbow Flexion 4/5, Elbow Extension 4/5,  fair  LUE: GH Flexion 2-/5, Elbow Flexion 3/5, Elbow Extension 3/5,  fair  Lower Extremity  ROM: limited by body habitus  Strength: Not WFL RLE: Hip flexion 2/5, Knee flexion 3+/5, Knee extension 3+/5, PF 3/5, DF 3/5  LLE: Hip flexion 2/5, Knee flexion 3+/5, Knee extension 3+/5, PF 3/5, DF 3/5   Sitting Static Balance Not NormalUE Support Two UE support and Assist Level Contact Guard  Sitting Dynamic Balance Not NormalUE Support Two UE support and Assist Level Min Assist  Standing Static  Balance Not NormalUE Support Two UE support and Assist Level Contact Guard  Standing Dynamic Balance Not NormalUE Support Two UE support and Assist Level Min Assist    Functional Assessments:  Bed Mobility  Bed Mobility: Yes  Bed Mobility 1  Bed Mobility 1: Rolling left  Level of Assistance 1: Minimum assistance  Bed Mobility 2  Bed Mobility  2: Rolling right  Level of Assistance 2: Minimum assistance  Bed Mobility 3  Bed Mobility 3: Supine sitting  Level of Assistance 3: Minimum assistance  Bed Mobility 4  Bed Mobility 4: Sitting to supine  Level of Assistance 4: Minimum assistance  Bed Mobility Comments 4: LE assist    Transfers  Transfer: Yes  Transfer 1  Transfer From 1: Sit to  Transfer to 1: Stand  Transfer Device 1: Walker  Transfer Level of Assistance 1: Contact guard  Transfers 2  Transfer From 2: Stand to  Transfer to 2: Sit  Transfer Device 2: Walker  Transfer Level of Assistance 2: Contact guard  Transfers 3  Transfer From 3: Bed to  Transfer to 3: Bed  Transfer Device 3: Walker  Transfer Level of Assistance 3: Contact guard    Ambulation/Gait Training  Ambulation/Gait Training Performed: Yes  Ambulation/Gait Training 1  Surface 1: Level tile  Device 1: Rolling walker  Assistance 1: Contact guard  Quality of Gait 1:  (wide LION, decreased step length, flexed posture, increased WOB)  Comments/Distance (ft) 1: 4'              Outcome Measures:  [facility] Basic Mobility  Turning from your back to your side while in a flat bed without using bedrails: A little  Moving from lying on your back to sitting on the side of a flat bed without using bedrails: A lot  Moving to and from bed to chair (including a wheelchair): A little  Standing up from a chair using your arms (e.g. wheelchair or bedside chair): A little  To walk in hospital room: Total  Climbing 3-5 steps with railing: Total  Basic Mobility - Total Score: 13           FSS-ICU  Ambulation: Walks <50 feet with any assistance x1 or walks any distance with  assistance x2 people  Rolling: Minimal assistance (performs 75% or more of task)  Sitting: Minimal assistance (performs 75% or more of task)  Transfer Sit-to-Stand: Minimal assistance (performs 75% or more of task)  Transfer Supine-to-Sit: Moderate assistance (performs 50 - 74% of task)  Total Score: 16  ICU Mobility Screen  ICU Mobility Scale: Walking with assistance of 1 person  Tinetti  Sitting Balance: Leans or slides in chair  Arises: Unable without help  Attempts to Arise: Unable without help  Immediate Standing Balance (First 5 Seconds): Unsteady (Swaggers, moves feet, trunk sway)  Standing Balance: Unsteady  Nudged: Begins to fall  Eyes Closed: Unsteady  Turned 360 Degrees: Steadiness: Unsteady (Grabs, staggers)  Turned 360 Degrees: Continuity of Steps: Discontinuous steps  Sitting Down: Unsafe (Misjudges distance, falls into chair)  Balance Score: 0  Initiation of Gait: Any hesitancy or multiple attempts to start  Step Height: R Swing Foot: Right foot does not clear floor completely with step  Step Length: R Swing Foot: Does not pass left stance foot with step  Step Height: L Swing Foot: Left foot does not clear floor completely with step  Step Length: L Swing Foot: Does not pass right stance foot with step  Step Symmetry: Right and left step length not equal (Estimate)  Step Continuity: Stopping or discontinuity between steps  Path: Marked deviation  Trunk: Marked sway or uses walking aid  Walking Time: Heels apart  Gait Score: 0  Total Score: 0          Encounter Problems       Encounter Problems (Active)       PT Problem       Patient will complete supine to sit and sit to supine Supervision  (Progressing)       Start:  04/19/25    Expected End:  05/03/25            Patient will perform sit<>stand transfer with LRAD, and Supervision  (Progressing)       Start:  04/19/25    Expected End:  05/03/25            Patient will ambulate >75' with LRAD and Supervision  (Progressing)       Start:  04/19/25     Expected End:  05/03/25            Patient will ascend/descend 3 steps with Right Rail Ascending and supervision  (Progressing)       Start:  04/19/25    Expected End:  05/03/25            Patient will complete Tinetti Balance Assesment with a score equal to or better than 18 to reduce falls risk.    (Progressing)       Start:  04/19/25    Expected End:  05/03/25               Pain - Adult            Assessment: Patient presetns 2/2 seizure and ETOH withdrawal.  Currently mod A for mobility with balance, gait, and functional deficits noted.  Currently high falls risk based on Tinetti and functional mobility assessments this date.  Patient would benefit from further therapy to increase functional independence and safety.  Will continue to follow during acute stay.      Education Documentation  Precautions, taught by Khari Antoine PT at 4/19/2025 11:48 AM.  Learner: Patient  Readiness: Acceptance  Method: Explanation  Response: Needs Reinforcement  Comment: POC review, DC rec    Body Mechanics, taught by Khari Antoien PT at 4/19/2025 11:48 AM.  Learner: Patient  Readiness: Acceptance  Method: Explanation  Response: Needs Reinforcement  Comment: POC review, DC rec    Mobility Training, taught by Khari Antoine PT at 4/19/2025 11:48 AM.  Learner: Patient  Readiness: Acceptance  Method: Explanation  Response: Needs Reinforcement  Comment: POC review, DC rec    Education Comments  No comments found.          04/19/25 at 11:50 AM   Khari Antoine PT   Rehab Office: [phone]                 [1]

## 2025-04-19 NOTE — SIGNIFICANT EVENT
Epilepsy Updates:    Elvis Masters is a 64 y.o. L handed  male with PMH significant for Alcohol use disorder, persistent A-fib (Eliquis), epilepsy,  KAYODE (noncompliant with CPAP), HFpEF, HTN, HLD, pre-DM, who presents as a transfer from  [facility] for uncontrollable seizures iso Alcohol withdrawal and epilepsy. Pt s/p extubation, however ongoing difficulties with regards to alcohol withdrawal and active delirium tremens. He was monitored on cvEEG with no seizures, epileptiform discharges or lateralizing signs. Encephalopathy improved.      Recommendations:  -C/w Keppra 1.5g BID  -Phenobarb taper per primary team  -Ativan 2 mg IV for prolonged motor seizure lasting more than 3 minutes or a cluster of 3 or more motor seizures in an 8 hour period.  -Transfer to medicine when able to downgrade from ICU    Katya Heredia MD   PGY-3 Neurology  Epilepsy [phone]

## 2025-04-19 NOTE — CONSULTS
Medical Toxicology Initial Consult Note    Inpatient consult to Toxicology  Consult performed by: Stella Rendon MD  Consult ordered by: James Chvaez DO  Reason for consult: Severe alcohol withdrawal      History Of Present Illness  Elvis Masters is a 64 y.o. male with a history of alcohol use disorder with complicated withdrawal and primary seizure disorder who presented to the hospital initially for seizure. Medical toxicology consulted for severe alcohol withdrawal.    Initially presented on 4/15 at [facility] for prolonged seizure witnessed by mother. Preceded to have 3 more seizures in the ED and intubated for prolonged seizure activity. Phenobarb loaded.     Transferred on 4/17 to [facility] for cvEEG due to concern for continued encephalopathy and possible subclinical seizures. Extubated this day, continued on phenobarbital.    Contacted on 4/18 for high CIWA scores, high 20s up to 30. Recommended reloading with a single dose of phenobarbital 10 mg/kg over 30 minutes and increasing PRN options to 260 mg for CIWA > 10, 130 mg for CIWA 6-10.     Since this load, he has improved greatly. Last CIWA was 2. Spoke with bedside nursing, hallucinations are gone, he is oriented although sometimes forgets location.     Speaking with him, he is tired by oriented. He reports previous withdrawal seizures and hallucinations. He remembers his hallucinations yesterday, of people watching him behind the walls and curtains, but says he has not seen them today. Denies auditory hallucinations, tremors, abdominal pain, muscle pain. Speaking with his nurses, he has not been lethargic most of the day.    MAR Review:  Phenobarb 800 mg given 4/18 1552  Phenobarb 260 mg given 4/18 1544, 1604, 1658, 1818  Phenobarb 130 mg given 4/18 2013  Phenobarb  65 mg given 4/19 0837  No quetiapine administrations     Past Medical History  He has a past medical history of Hypertension.    Surgical History  He has no past surgical history on  file.     Social History  He reports that he has never smoked. He has quit using smokeless tobacco. He reports current alcohol use. No history on file for drug use.    Family History  Family History[1]     Allergies  Lisinopril    Review of Systems     Objective  Last Recorded Vitals  BP (!) 170/107   Pulse 97   Temp 36.2 °C (97.2 °F)   Resp 16   Wt (!) 170 kg (374 lb 12.5 oz)   SpO2 100%     Physical Exam  Vital signs and nursing notes reviewed.   General: Sleepy but arouses to voice, non-toxic appearing  HEENT: Normocephalic, atraumatic, normal nares without rhinorrhea, normal auricles without otorrhea, pupils are 4 mm bilaterally, equal round and reactive, EOMI, no conjunctival injection, no scleral icterus, moist mucous membranes, no tongue fasciculations  Cardiovascular: Regular rate and rhythm, radial pulses 2+ bilaterally   Pulmonary: Non-labored breathing  Abdomen: Soft, non-tender, non-distended  Musculoskeletal: Normal muscle tone and bulk, no lower extremity edema  Neurologic: CN2-12 grossly intact, normal strength throughout, no focal sensory deficit noted, no tremor, no hyperreflexia, no clonus  Skin: Clean, dry, and intact without lesion or rash        Relevant Results  CMP unremarkable  CBC with stable anemia    ECG per my read NSR with HR 97, QRS 88, , similar to previous on 4/16    Problem List  Severe alcohol withdrawal with delirium tremens    Assessment:  Elvis Masters is a 64 y.o. male on day 2 of admission presenting for seizure complicated by severe alcohol withdrawal.    After repeating a loading dosing of phenobarbital, he appears to be doing much better. He is no longer hallucinating and has not needed any PRNs for about 6 hours at this point. He also is not having any evidence of respiratory suppression. At this point, he can start a standard phenobarb taper with standard PRNs. I would continue to avoid benzodiazepines while on phenobarbital and minimize use of other sedating  "medications including opioids. As his symptoms are much improved, I will plan to sign off today.    Recommendations:  - Okay to start standard phenobarb taper for history of DTs per \"Alcohol Withdrawal Focused - IP/ED\" orderset   - Select Taper Dose \"history of DTs\" either IV push or Oral taper is okay  - Seroquel 25 mg PRN every 6 hrs for agitation or anxiety   - Avoid benzodiazepines while on phenobarbital  - Continue vitamin supplementation      Thank you for allowing us to participate in this patient's care. Medical toxicology will sign off at this time. Please contact the medical  on call through [facility] ([facility]) for questions, concerns, or changes in patient status.    Stella Rendon MD  Medical Toxicology   Attending Physician    I spent 60 minutes in the professional and overall care of this patient.          [1] No family history on file.    "

## 2025-04-19 NOTE — PROGRESS NOTES
[facility]  NEUROSCIENCE INTENSIVE CARE UNIT  DAILY PROGRESS NOTE       Patient Name: Elvis Masters   MRN: [id number]     Admit Date: 2025     : 1960 AGE: 64 y.o. GENDER: male        Subjective    64 year-old man with past medical history of hypertension, hyperlipidemia, atrial fibrillation (on Eliquis), HFpEF, alcohol use disorder, seizure disorder (on Keppra) presented on 4/15/2025 with EtOH withdraw and seizure. Per chart review, patient had GTC witnessed by mother lasting ~10 minutes. He subsequently had three episodes of seizure-like activity at [facility] and was loaded with 4.5g Keppra. He was transferred to [facility] for cvEEG monitoring.    Significant Events:  - 4/15: Intubated at OSH for prolonged postictal status and for airway protection  - : Transferred to [facility] for cvEEG monitoring  - : Transfer request initiated to medicine, cancelled for high CIWAs; med tox c/s, buprenorphine loaded  - : Pending transfer to [facility]    Interval Events:   NAEON. This morning no acute issues. CIWAs improved to 4-5 this morning. [facility] agreed to transfer.     Objective   VITALS (24H):  Temp:  [36.3 °C (97.3 °F)-37.2 °C (99 °F)] 36.3 °C (97.3 °F)  Heart Rate:  [] 88  Resp:  [10-27] 11  BP: (127-166)/() 163/95  FiO2 (%):  [40 %-50 %] 40 %  INTAKE/OUTPUT:  Intake/Output Summary (Last 24 hours) at 2025 0723  Last data filed at 2025 0515  Gross per 24 hour   Intake 1400 ml   Output 2251 ml   Net -851 ml     VENT SETTINGS:  FiO2 (%):  [40 %-50 %] 40 %     PHYSICAL EXAM:  NEURO:  - ANOx2 to self and place, incorrect month and year, knows he's in the hospital for withdrawal, denies any AVH, resting comfortably in bed  - Follows commands  - EOV, pupils 2mm equal and reactive bilaterally, EOMI, symmetric smile  - SANCHEZ spontaneously/AG, BUE>BLE  CV:  - RRR on telemetry, NSR  RESP:  - No signs of resp distress  - Oxygen: NC 3L  :  - External henriquez catheter  in place  GI:  - Abdomen distended, soft  SKIN:  - Intact    MEDICATIONS:  Scheduled: PRN: Continuous:   Scheduled Medications[1] PRN Medications[2] Continuous Medications[3]     LAB RESULTS:  Results from last 72 hours   Lab Units 04/19/25  0212 04/18/25  0058   GLUCOSE mg/dL 78 94   SODIUM mmol/L 140 137   POTASSIUM mmol/L 3.0* 3.3*   CHLORIDE mmol/L 100 100   CO2 mmol/L 29 27   ANION GAP mmol/L 14 13   BUN mg/dL 6 10   CREATININE mg/dL 0.91 1.05   EGFR mL/min/1.73m*2 >90 80   CALCIUM mg/dL 7.8* 7.9*   PHOSPHORUS mg/dL 3.2 2.8   ALBUMIN g/dL 3.2* 3.2*   MAGNESIUM mg/dL 1.97 1.97   POCT CALCIUM IONIZED (MMOL/L) IN BLOOD mmol/L 1.05* 0.94*      Results from last 72 hours   Lab Units 04/19/25  0212 04/18/25  0058   WBC AUTO x10*3/uL 4.5 5.8   NRBC AUTO /100 WBCs 0.0 0.0   RBC AUTO x10*6/uL 3.66* 3.45*   HEMOGLOBIN g/dL 11.0* 10.5*   HEMATOCRIT % 33.1* 29.9*   MCV fL 90 87   MCH pg 30.1 30.4   MCHC g/dL 33.2 35.1   RDW % 15.7* 15.4*   PLATELETS AUTO x10*3/uL 156 129*   NEUTROS PCT AUTO % 59.9 74.4   IG PCT AUTO % 0.7 0.9   LYMPHS PCT AUTO % 22.6 15.3   MONOS PCT AUTO % 12.6 7.0   EOS PCT AUTO % 3.8 2.1   BASOS PCT AUTO % 0.4 0.3   NEUTROS ABS x10*3/uL 2.70 4.34   IG AUTO x10*3/uL 0.03 0.05   LYMPHS ABS AUTO x10*3/uL 1.02* 0.89*   MONOS ABS AUTO x10*3/uL 0.57 0.41   EOS ABS AUTO x10*3/uL 0.17 0.12   BASOS ABS AUTO x10*3/uL 0.02 0.02        Assessment/Plan    Elvis Masters is a 64 y.o. L handed  male with PMH significant for Alcohol use disorder, persistent A-fib (Eliquis), epilepsy, KAYODE (noncompliant with CPAP), HFpEF, HTN, HLD, pre-DM, who presents as a transfer from [facility] for uncontrollable seizures iso Alcohol withdrawal and epilepsy. Extubated to [facility] on 4/17. EEG negative for epileptiform seizures, this likely represents alcohol withdrawal. Continue on CIWA with phenobarbital taper. Will transfer to medicine for further care. Transfer initiated to [facility].    NEURO:  #Epilepsy with breakthrough seizures iso  alcohol withdrawal  #Alcohol withdrawal syndrome with delirium tremors  Assessment:  - Neurologically: see exam above  - 4/15: s/p 4.5g Keppra load  - 4/5: Keppra 37 (therapeutic)  - EEG 4/16: Severe encephalopathy, no seizures  - Home AEDs: Keppra 1500mg BID, Gabapentin 600mg QID  Plan:  - NSU  - Neuro Checks: Q1H  - Sedation: Propofol and Fentanyl  - cvEEG  - 1.5g Keppra BID (home dose)  - Medical toxicology following   - s/p 800mg buprenorphine load   - CIWA protocol with PRN buprenorphine   - Plan to start taper 4/19 pending plan from med tox  - Folic acid, MVI, 500 mg IV thiamine TID x3 days then transition to PO thiamine 100 mg daily  - Seizure and fall precautions   - PT/OT/SLP    CARDIOVASCULAR:  #Atrial Fibrillation (on eliquis)  #HFpEF, HTN, HLD  #Tropinemia (improving), likely demand ischemia  Assessment:  - ECHO 4/15/25: EF 50-55%, moderate dilation of ascending aorta, LA normal, no PFO noted  Plan:  - Continue to monitor on telemetry  - BP goal: Normotension  - Eliquis resumed 4/18 PM  - Troponin 61 -> 75 -> 78 -> 52    RESPIRATORY:  #Acute hypoxemic respiratory failure 2/2 PNA  #KAYODE  Assessment:  - Intubated, 50% FiO2, 10 PEEP, RR 16  Plan:  - Continuous pulse oximetry   - O2 PRN to maintain SpO2 > 94%, wean as tolerated  - Ventilator bundle while intubated, Daily CPAP and weaning parameters while intubated, and Wean to extubate  - See ID section    RENAL/:  #JARVIS [resolved]  Assessment:  - Baseline BUN/Cr: 8/0.70 (2024)  Results from last 72 hours   Lab Units 04/19/25  0212 04/18/25  0058   BUN mg/dL 6 10   CREATININE mg/dL 0.91 1.05       Plan:  - Monitor with daily RFP  - Maintain henriquez catheter for: critically ill patient who need accurate urinary output measurements    FEN/GI:  #No active issues  Assessment:  - Last BM: 4/18/2025  Plan:  - Monitor and replace electrolytes per protocol  - Diet: regular   - Bowel Regimen: Miralax QD    ENDOCRINE:  #Pre-DM  Assessment:  Results from last 7 days    Lab Units 25  0430 25  0212 25  2331 25  2002 25  1508 25  1143 25  0752 25  0355 25  0058 25  1536 25  1156 25  1049 25  0601 25  0510 04/15/25  0743   POCT GLUCOSE mg/dL 77  --  71* 81 97 78 83   < >  --    < >  --    < >  --   --   --    GLUCOSE mg/dL  --  78  --   --   --   --   --   --  94  --  91  --  137* 90 125*   SODIUM mmol/L  --  140  --   --   --   --   --   --  137  --  134*  --  136 136 135*    < > = values in this interval not displayed.   - A1c 10/21/24: 5.7%  Plan:  - Accuchecks & ISS Q4H     HEMATOLOGY:  #Anemia  Assessment:  - Baseline Hgb: 13 ()  - Baseline Plts: 190 ()  Results from last 7 days   Lab Units 25  02125  1650   HEMOGLOBIN g/dL 11.0* 10.5* 10.6*   HEMATOCRIT % 33.1* 29.9* 32.8*   PLATELETS AUTO x10*3/uL 156 129* 130*     Plan:  - Continue to monitor with daily CBC and Coag panel    INFECTIOUS DISEASE:  #Left lower lobe pneumonia - likely 2/2 aspiration   Assessment:  Results from last 7 days   Lab Units 25  02125  1650   WBC AUTO x10*3/uL 4.5 5.8 5.5    - Temp (24hrs), Av.7 °C (98 °F), Min:36.3 °C (97.3 °F), Max:37.2 °C (99 °F)  - 4/15: Bcx NGTD  - 4/15: MRSA nares negative  - : Resp cx shows MSSA  - s/p Vancomycin (-)  Plan:  - Continue to monitor for s/sx of infection  - Pan culture for temperature > 38.4 C  - Continue Zosyn ( - ), pending sensitivities (likely 7-10 day course)    MUSCULOSKELETAL:  - No acute issues    SKIN:  - No acute issues  - Turns and skin care per NSU protocol    ACCESS:  - PIVs    PROPHYLAXIS:  - DVT Ppx: SCDs, plan to restart heparin gtt  - GI Ppx: Pantoprazole    RESTRAINTS:  I agree with nursing assessment of the patient's need for restraints to protect the patient from injury and facilitate healing. The patient is unable to cooperate with the plan of care and at risk for disrupting  critical therapy (i.e., removing medical devices, lines, tubes and/or dressings).  Please see order for specifics. Restraints can be removed when the patient is able to cooperate with plan of care and allow healing to occur, or the medical devices at risk are discontinued by the medical team.     Soft UE wrist restraints    Alcides Dodd MD  PGY2 Neurology  Neuroscience Intensive Care         [1] apixaban, 5 mg, oral, q12h  pantoprazole, 40 mg, oral, Daily before breakfast   Or  esomeprazole, 40 mg, nasoduodenal tube, Daily before breakfast   Or  pantoprazole, 40 mg, intravenous, Daily before breakfast  folic acid, 1 mg, oral, Daily  insulin lispro, 0-5 Units, subcutaneous, q4h  levETIRAcetam, 1,500 mg, nasogastric tube, BID  magnesium sulfate, 2 g, intravenous, Once  multivitamin with minerals, 1 tablet, oral, Daily  piperacillin-tazobactam, 4.5 g, intravenous, q6h  polyethylene glycol, 17 g, oral, Daily  potassium chloride, 40 mEq, oral, Once  potassium chloride CR, 20 mEq, oral, Once  [START ON 4/20/2025] thiamine, 100 mg, oral, Daily  thiamine, 500 mg, intravenous, q8h AURY  vancomycin, 1,500 mg, intravenous, q12h     [2] PRN medications: albuterol, calcium gluconate, calcium gluconate, dextrose, dextrose, glucagon, glucagon, glucagon, magnesium sulfate, magnesium sulfate, oxygen, PHENobarbital, PHENobarbital, PHENobarbital, potassium chloride CR **OR** potassium chloride, potassium chloride CR **OR** potassium chloride, potassium chloride, QUEtiapine, sennosides-docusate sodium, vancomycin  [3]

## 2025-04-19 NOTE — PROGRESS NOTES
Vancomycin Dosing by Pharmacy- Cessation of Therapy    Consult to pharmacy for vancomycin dosing has been discontinued by the prescriber, pharmacy will sign off at this time.    Please call pharmacy if there are further questions or re-enter a consult if vancomycin is resumed.     Sesar Alegre, PharmD

## 2025-04-19 NOTE — CARE PLAN
The patient's goals for the shift include      The clinical goals for the shift include remain HDS    Problem: Pain - Adult  Goal: Verbalizes/displays adequate comfort level or baseline comfort level  Outcome: Progressing     Problem: Safety - Adult  Goal: Free from fall injury  Outcome: Progressing     Problem: Discharge Planning  Goal: Discharge to home or other facility with appropriate resources  Outcome: Progressing     Problem: Chronic Conditions and Co-morbidities  Goal: Patient's chronic conditions and co-morbidity symptoms are monitored and maintained or improved  Outcome: Progressing     Problem: Nutrition  Goal: Nutrient intake appropriate for maintaining nutritional needs  Outcome: Progressing     Problem: Skin  Goal: Decreased wound size/increased tissue granulation at next dressing change  Outcome: Progressing  Flowsheets (Taken 4/19/2025 0058)  Decreased wound size/increased tissue granulation at next dressing change:   Promote sleep for wound healing   Protective dressings over bony prominences  Goal: Participates in plan/prevention/treatment measures  Outcome: Progressing  Flowsheets (Taken 4/19/2025 0058)  Participates in plan/prevention/treatment measures:   Discuss with provider PT/OT consult   Elevate heels   Increase activity/out of bed for meals  Goal: Prevent/manage excess moisture  Outcome: Progressing  Flowsheets (Taken 4/19/2025 0058)  Prevent/manage excess moisture:   Cleanse incontinence/protect with barrier cream   Follow provider orders for dressing changes   Moisturize dry skin   Monitor for/manage infection if present  Goal: Prevent/minimize sheer/friction injuries  Outcome: Progressing  Flowsheets (Taken 4/19/2025 0058)  Prevent/minimize sheer/friction injuries:   Turn/reposition every 2 hours/use positioning/transfer devices   Increase activity/out of bed for meals   HOB 30 degrees or less   Complete micro-shifts as needed if patient unable. Adjust patient position to relieve  pressure points, not a full turn   Use pull sheet   Utilize specialty bed per algorithm  Goal: Promote/optimize nutrition  Outcome: Progressing  Flowsheets (Taken 4/19/2025 0058)  Promote/optimize nutrition:   Offer water/supplements/favorite foods   Discuss with provider if NPO > 2 days   Assist with feeding   Consume > 50% meals/supplements   Reassess MST if dietician not consulted   Monitor/record intake including meals  Goal: Promote skin healing  Outcome: Progressing  Flowsheets (Taken 4/19/2025 0058)  Promote skin healing:   Assess skin/pad under line(s)/device(s)   Ensure correct size (line/device) and apply per  instructions   Turn/reposition every 2 hours/use positioning/transfer devices   Rotate device position/do not position patient on device   Protective dressings over bony prominences     Problem: Fall/Injury  Goal: Not fall by end of shift  Outcome: Progressing  Goal: Be free from injury by end of the shift  Outcome: Progressing  Goal: Verbalize understanding of personal risk factors for fall in the hospital  Outcome: Progressing  Goal: Verbalize understanding of risk factor reduction measures to prevent injury from fall in the home  Outcome: Progressing  Goal: Use assistive devices by end of the shift  Outcome: Progressing  Goal: Pace activities to prevent fatigue by end of the shift  Outcome: Progressing     Problem: Safety - Medical Restraint  Goal: Remains free of injury from restraints (Restraint for Interference with Medical Device)  4/19/2025 0059 by Shirley Dos Santos RN  Flowsheets (Taken 4/19/2025 0059)  Remains free of injury from restraints (restraint for interference with medical device):   Determine that other, less restrictive measures have been tried or would not be effective before applying the restraint   Inform patient/family regarding the reason for restraint   Every 2 hours: Monitor safety, psychosocial status, comfort, nutrition and hydration   Evaluate the  patient's condition at the time of restraint application  4/19/2025 0058 by Shirley Dos Santos RN  Outcome: Progressing  Goal: Free from restraint(s) (Restraint for Interference with Medical Device)  4/19/2025 0059 by Shirley Dos Santos RN  Flowsheets (Taken 4/19/2025 0059)  Free from restraint(s) (restraint for interference with medical device):   Every 24 hours: Continued use of restraint requires Licensed Independent Practitioner to perform face to face examination and written order   ONCE/SHIFT or MINIMUM Every 12 hours: Assess and document the continuing need for restraints   Identify and implement measures to help patient regain control  4/19/2025 0058 by Shirley Dos Santos RN  Outcome: Progressing

## 2025-04-20 VITALS
HEART RATE: 92 BPM | TEMPERATURE: 98.4 F | HEIGHT: 74 IN | RESPIRATION RATE: 16 BRPM | BODY MASS INDEX: 40.43 KG/M2 | DIASTOLIC BLOOD PRESSURE: 96 MMHG | SYSTOLIC BLOOD PRESSURE: 160 MMHG | OXYGEN SATURATION: 96 % | WEIGHT: 315 LBS

## 2025-04-20 LAB
ALBUMIN SERPL BCP-MCNC: 3.4 G/DL (ref 3.4–5)
ANION GAP SERPL CALC-SCNC: 13 MMOL/L (ref 10–20)
BUN SERPL-MCNC: 5 MG/DL (ref 6–23)
CALCIUM SERPL-MCNC: 8.1 MG/DL (ref 8.6–10.6)
CHLORIDE SERPL-SCNC: 100 MMOL/L (ref 98–107)
CO2 SERPL-SCNC: 32 MMOL/L (ref 21–32)
CREAT SERPL-MCNC: 0.86 MG/DL (ref 0.5–1.3)
EGFRCR SERPLBLD CKD-EPI 2021: >90 ML/MIN/1.73M*2
ERYTHROCYTE [DISTWIDTH] IN BLOOD BY AUTOMATED COUNT: 15.6 % (ref 11.5–14.5)
GLUCOSE BLD MANUAL STRIP-MCNC: 79 MG/DL (ref 74–99)
GLUCOSE BLD MANUAL STRIP-MCNC: 88 MG/DL (ref 74–99)
GLUCOSE BLD MANUAL STRIP-MCNC: 88 MG/DL (ref 74–99)
GLUCOSE SERPL-MCNC: 85 MG/DL (ref 74–99)
HCT VFR BLD AUTO: 34.9 % (ref 41–52)
HGB BLD-MCNC: 11.4 G/DL (ref 13.5–17.5)
MAGNESIUM SERPL-MCNC: 1.85 MG/DL (ref 1.6–2.4)
MCH RBC QN AUTO: 30.6 PG (ref 26–34)
MCHC RBC AUTO-ENTMCNC: 32.7 G/DL (ref 32–36)
MCV RBC AUTO: 94 FL (ref 80–100)
NRBC BLD-RTO: 0 /100 WBCS (ref 0–0)
PHOSPHATE SERPL-MCNC: 2.2 MG/DL (ref 2.5–4.9)
PLATELET # BLD AUTO: 156 X10*3/UL (ref 150–450)
POTASSIUM SERPL-SCNC: 2.8 MMOL/L (ref 3.5–5.3)
RBC # BLD AUTO: 3.73 X10*6/UL (ref 4.5–5.9)
SODIUM SERPL-SCNC: 142 MMOL/L (ref 136–145)
WBC # BLD AUTO: 5.3 X10*3/UL (ref 4.4–11.3)

## 2025-04-20 PROCEDURE — 82947 ASSAY GLUCOSE BLOOD QUANT: CPT

## 2025-04-20 PROCEDURE — 2500000001 HC RX 250 WO HCPCS SELF ADMINISTERED DRUGS (ALT 637 FOR MEDICARE OP): Performed by: STUDENT IN AN ORGANIZED HEALTH CARE EDUCATION/TRAINING PROGRAM

## 2025-04-20 PROCEDURE — 99233 SBSQ HOSP IP/OBS HIGH 50: CPT | Performed by: STUDENT IN AN ORGANIZED HEALTH CARE EDUCATION/TRAINING PROGRAM

## 2025-04-20 PROCEDURE — 83735 ASSAY OF MAGNESIUM: CPT | Performed by: STUDENT IN AN ORGANIZED HEALTH CARE EDUCATION/TRAINING PROGRAM

## 2025-04-20 PROCEDURE — 36415 COLL VENOUS BLD VENIPUNCTURE: CPT | Performed by: STUDENT IN AN ORGANIZED HEALTH CARE EDUCATION/TRAINING PROGRAM

## 2025-04-20 PROCEDURE — 2500000004 HC RX 250 GENERAL PHARMACY W/ HCPCS (ALT 636 FOR OP/ED): Mod: JZ | Performed by: STUDENT IN AN ORGANIZED HEALTH CARE EDUCATION/TRAINING PROGRAM

## 2025-04-20 PROCEDURE — 80069 RENAL FUNCTION PANEL: CPT | Performed by: STUDENT IN AN ORGANIZED HEALTH CARE EDUCATION/TRAINING PROGRAM

## 2025-04-20 PROCEDURE — 85027 COMPLETE CBC AUTOMATED: CPT | Performed by: STUDENT IN AN ORGANIZED HEALTH CARE EDUCATION/TRAINING PROGRAM

## 2025-04-20 PROCEDURE — 2500000004 HC RX 250 GENERAL PHARMACY W/ HCPCS (ALT 636 FOR OP/ED): Performed by: STUDENT IN AN ORGANIZED HEALTH CARE EDUCATION/TRAINING PROGRAM

## 2025-04-20 RX ORDER — PHENOBARBITAL 32.4 MG/1
32.4 TABLET ORAL 3 TIMES DAILY
Status: DISCONTINUED | OUTPATIENT
Start: 2025-04-24 | End: 2025-04-20 | Stop reason: HOSPADM

## 2025-04-20 RX ORDER — FOLIC ACID 1 MG/1
1 TABLET ORAL DAILY
Start: 2025-04-21

## 2025-04-20 RX ORDER — PHENOBARBITAL 32.4 MG/1
97.2 TABLET ORAL 3 TIMES DAILY
Status: DISCONTINUED | OUTPATIENT
Start: 2025-04-20 | End: 2025-04-20 | Stop reason: HOSPADM

## 2025-04-20 RX ORDER — ALBUTEROL SULFATE 0.83 MG/ML
2.5 SOLUTION RESPIRATORY (INHALATION) EVERY 6 HOURS PRN
Start: 2025-04-20

## 2025-04-20 RX ORDER — PHENOBARBITAL 32.4 MG/1
TABLET ORAL
Start: 2025-04-20 | End: 2025-04-26

## 2025-04-20 RX ORDER — PHENOBARBITAL 32.4 MG/1
64.8 TABLET ORAL 3 TIMES DAILY
Status: DISCONTINUED | OUTPATIENT
Start: 2025-04-22 | End: 2025-04-20 | Stop reason: HOSPADM

## 2025-04-20 RX ORDER — POLYETHYLENE GLYCOL 3350 17 G/17G
17 POWDER, FOR SOLUTION ORAL DAILY
Start: 2025-04-21

## 2025-04-20 RX ORDER — PHENOBARBITAL 32.4 MG/1
65 TABLET ORAL EVERY 6 HOURS PRN
Status: DISCONTINUED | OUTPATIENT
Start: 2025-04-20 | End: 2025-04-20 | Stop reason: HOSPADM

## 2025-04-20 RX ORDER — AMOXICILLIN 250 MG
2 CAPSULE ORAL 2 TIMES DAILY PRN
Start: 2025-04-20

## 2025-04-20 RX ORDER — LORAZEPAM 2 MG/ML
2 INJECTION INTRAMUSCULAR EVERY 5 MIN PRN
Status: DISCONTINUED | OUTPATIENT
Start: 2025-04-15 | End: 2025-04-20

## 2025-04-20 RX ORDER — QUETIAPINE FUMARATE 25 MG/1
25 TABLET, FILM COATED ORAL EVERY 6 HOURS PRN
Start: 2025-04-20

## 2025-04-20 RX ORDER — POTASSIUM CHLORIDE 20 MEQ/1
40 TABLET, EXTENDED RELEASE ORAL EVERY 4 HOURS
Status: DISCONTINUED | OUTPATIENT
Start: 2025-04-20 | End: 2025-04-20

## 2025-04-20 RX ORDER — POTASSIUM CHLORIDE 1.5 G/1.58G
40 POWDER, FOR SOLUTION ORAL EVERY 4 HOURS
Status: COMPLETED | OUTPATIENT
Start: 2025-04-20 | End: 2025-04-20

## 2025-04-20 RX ORDER — LORAZEPAM 2 MG/ML
2 INJECTION INTRAMUSCULAR ONCE
Status: COMPLETED | OUTPATIENT
Start: 2025-04-15 | End: 2025-04-15

## 2025-04-20 RX ORDER — PANTOPRAZOLE SODIUM 40 MG/1
40 TABLET, DELAYED RELEASE ORAL
Start: 2025-04-21

## 2025-04-20 RX ORDER — LEVETIRACETAM 750 MG/1
1500 TABLET ORAL 2 TIMES DAILY
Start: 2025-04-20

## 2025-04-20 RX ORDER — MULTIVIT-MIN/IRON FUM/FOLIC AC 7.5 MG-4
1 TABLET ORAL DAILY
Start: 2025-04-21

## 2025-04-20 RX ORDER — PHENOBARBITAL 64.8 MG/1
65 TABLET ORAL EVERY 6 HOURS PRN
Start: 2025-04-20

## 2025-04-20 RX ORDER — LANOLIN ALCOHOL/MO/W.PET/CERES
100 CREAM (GRAM) TOPICAL DAILY
Start: 2025-04-21

## 2025-04-20 RX ADMIN — PANTOPRAZOLE SODIUM 40 MG: 40 INJECTION, POWDER, FOR SOLUTION INTRAVENOUS at 06:43

## 2025-04-20 RX ADMIN — LEVETIRACETAM 1500 MG: 500 TABLET, FILM COATED ORAL at 09:53

## 2025-04-20 RX ADMIN — APIXABAN 5 MG: 5 TABLET, FILM COATED ORAL at 09:52

## 2025-04-20 RX ADMIN — POTASSIUM CHLORIDE 40 MEQ: 1.5 POWDER, FOR SOLUTION ORAL at 09:52

## 2025-04-20 RX ADMIN — PIPERACILLIN SODIUM AND TAZOBACTAM SODIUM 4.5 G: 4; .5 INJECTION, SOLUTION INTRAVENOUS at 12:48

## 2025-04-20 RX ADMIN — THIAMINE HCL TAB 100 MG 100 MG: 100 TAB at 01:03

## 2025-04-20 RX ADMIN — THIAMINE HYDROCHLORIDE 500 MG: 100 INJECTION, SOLUTION INTRAMUSCULAR; INTRAVENOUS at 06:44

## 2025-04-20 RX ADMIN — FOLIC ACID 1 MG: 1 TABLET ORAL at 09:52

## 2025-04-20 RX ADMIN — Medication 1 TABLET: at 09:52

## 2025-04-20 RX ADMIN — PHENOBARBITAL 97.2 MG: 32.4 TABLET ORAL at 16:36

## 2025-04-20 RX ADMIN — PIPERACILLIN SODIUM AND TAZOBACTAM SODIUM 4.5 G: 4; .5 INJECTION, SOLUTION INTRAVENOUS at 06:43

## 2025-04-20 RX ADMIN — PIPERACILLIN SODIUM AND TAZOBACTAM SODIUM 4.5 G: 4; .5 INJECTION, SOLUTION INTRAVENOUS at 01:02

## 2025-04-20 RX ADMIN — POTASSIUM CHLORIDE 40 MEQ: 1.5 POWDER, FOR SOLUTION ORAL at 12:48

## 2025-04-20 ASSESSMENT — LIFESTYLE VARIABLES
PAROXYSMAL SWEATS: NO SWEAT VISIBLE
TREMOR: 2
VISUAL DISTURBANCES: NOT PRESENT
AGITATION: NORMAL ACTIVITY
HEADACHE, FULLNESS IN HEAD: NOT PRESENT
ANXIETY: NO ANXIETY, AT EASE
ORIENTATION AND CLOUDING OF SENSORIUM: ORIENTED AND CAN DO SERIAL ADDITIONS
NAUSEA AND VOMITING: NO NAUSEA AND NO VOMITING
PAROXYSMAL SWEATS: NO SWEAT VISIBLE
AUDITORY DISTURBANCES: NOT PRESENT
HEADACHE, FULLNESS IN HEAD: NOT PRESENT
TREMOR: 2
VISUAL DISTURBANCES: NOT PRESENT
HEADACHE, FULLNESS IN HEAD: NOT PRESENT
TOTAL SCORE: 2
ORIENTATION AND CLOUDING OF SENSORIUM: ORIENTED AND CAN DO SERIAL ADDITIONS
AGITATION: NORMAL ACTIVITY
ANXIETY: NO ANXIETY, AT EASE
NAUSEA AND VOMITING: NO NAUSEA AND NO VOMITING
TREMOR: 2
AUDITORY DISTURBANCES: NOT PRESENT
NAUSEA AND VOMITING: NO NAUSEA AND NO VOMITING
TOTAL SCORE: 2
ANXIETY: NO ANXIETY, AT EASE
ORIENTATION AND CLOUDING OF SENSORIUM: ORIENTED AND CAN DO SERIAL ADDITIONS
AUDITORY DISTURBANCES: NOT PRESENT
TOTAL SCORE: 2
VISUAL DISTURBANCES: NOT PRESENT
AGITATION: NORMAL ACTIVITY
PAROXYSMAL SWEATS: NO SWEAT VISIBLE

## 2025-04-20 ASSESSMENT — PAIN SCALES - GENERAL: PAINLEVEL_OUTOF10: 0 - NO PAIN

## 2025-04-20 NOTE — PROGRESS NOTES
"Elvis Masters is a 64 y.o. male on hospital day 3 of admission presenting with Seizure (Multi).    Subjective     Patient complains of generalized muscle pain, likely from multiple seizures. Awake and alert.    Objective     GENERAL APPEARANCE: A&Ox3, appears in no acute distress  HEAD: normocephalic, atraumatic  THROAT: Oral cavity and pharynx normal. No inflammation, swelling, exudate, or lesions.  NECK: Neck supple, non-tender without lymphadenopathy, masses or thyromegaly.  CARDIAC: Normal S1 and S2. No S3, S4 or murmurs. Rhythm is regular. There is no peripheral edema, cyanosis or pallor. Extremities are warm and well perfused. No carotid bruits.  LUNGS: Clear to auscultation bilaterally without rales, rhonchi, wheezing or diminished breath sounds.  ABDOMEN: Obese, Positive bowel sounds. Soft, nondistended, nontender. No guarding or rebound. No masses.  EXTREMITIES: No significant deformity or joint abnormality. No edema. Peripheral pulses intact. No varicosities.  SKIN: Skin normal color, texture and turgor with no lesions or rash  PSYCHIATRIC: oriented to person, place, and time, good judgement and reason, without hallucinations, abnormal affect or abnormal behaviors during the examination. Patient is not suicidal.        Last Recorded Vitals  Blood pressure (!) 168/95, pulse 90, temperature 36.4 °C (97.5 °F), temperature source Temporal, resp. rate 20, height 1.87 m (6' 1.62\"), weight (!) 161 kg (354 lb 0.9 oz), SpO2 95%.  Intake/Output last 3 Shifts:  I/O last 3 completed shifts:  In: 1000 (6.2 mL/kg) [IV Piggyback:1000]  Out: 3201 (19.9 mL/kg) [Urine:3200 (0.6 mL/kg/hr); Stool:1]  Weight: 160.6 kg     Relevant Results  Lab Results   Component Value Date    WBC 5.3 04/20/2025    HGB 11.4 (L) 04/20/2025    HCT 34.9 (L) 04/20/2025    MCV 94 04/20/2025     04/20/2025      Lab Results   Component Value Date    GLUCOSE 85 04/20/2025    CALCIUM 8.1 (L) 04/20/2025     04/20/2025    K 2.8 (LL) " 04/20/2025    CO2 32 04/20/2025     04/20/2025    BUN 5 (L) 04/20/2025    CREATININE 0.86 04/20/2025       Assessment/Plan       The patient is a 64-year-old male with a hx of alcohol use disorder, persistent a-fib on Eliquis, epilepsy, KAYODE, HFpEF, and HTN who presents as a transfer from [place] for uncontrollable seizures due to alcohol withdrawal seizures and delirium tremens. The patient was initially transferred to the NSU and was intubated due to prolonged post-ictal phase. He has since been extubated and transferred to the medical floor and is awake and alert. Patient is undergoing phenobarbital taper. Transfer was initiated to [facility].      Seizures due to alcohol withdrawal  Epilepsy  - Patient with multiple seizures due to breakthrough seizures from alcohol withdrawal  - Initially intubated due to prolonged ictal phase and inability to protect his airway and now has since been extubated and is alert and oriented  - Neurology recommends cont. Keppra 1.5g BID and gabapentin 600TID  - Cont. Phenobarbital taper, with PRN doses for worsening symptoms  - Cont. Folic acid, MVI, and thiamine   - Will need PT/OT eval    Left lower lobe pneumonia  - Blood cultures negative  - Resp culture shows MSSA  - Initially started on Vancomycin and Zosyn, currently on Zosyn alone (4/17-)    Atrial fibrillation  HFpEF  HTN  HLD  Tropinemia due to demand ischemia, improving  - Echo 4/15 - EF 50-55%, moderate dilation of ascending aorta  - Cont. Eliquis  - BP on the higher side, will start therapy if still no improvement after withdrawal improves    Anemia, improving  - Will cont. To follow CBC    Acute kidney injury, resolved      Code status: FULL CODE  Dispo: Phenobarbital taper, transfer to [facility] initiated    Ismael Lloyd MD     The patient encounter includes all but not limited to; Evaluation of laboratory results, pertinent imaging, and vital signs. Daily updates are discussed with any consulting services  and family/medical power of  as needed. The patient's discharge  process begins at admission and daily contact with the patient's TCC and SW is pertinent in their efficient and safe discharge.

## 2025-04-20 NOTE — CARE PLAN
The patient's goals for the shift include      The clinical goals for the shift include patient will be free from fall      Problem: Safety - Adult  Goal: Free from fall injury  Outcome: Progressing     Problem: Chronic Conditions and Co-morbidities  Goal: Patient's chronic conditions and co-morbidity symptoms are monitored and maintained or improved  Outcome: Progressing     Problem: Nutrition  Goal: Nutrient intake appropriate for maintaining nutritional needs  Outcome: Progressing

## 2025-04-20 NOTE — CARE PLAN
Problem: Pain - Adult  Goal: Verbalizes/displays adequate comfort level or baseline comfort level  Outcome: Progressing     Problem: Safety - Adult  Goal: Free from fall injury  Outcome: Progressing     Problem: Discharge Planning  Goal: Discharge to home or other facility with appropriate resources  Outcome: Progressing     Problem: Chronic Conditions and Co-morbidities  Goal: Patient's chronic conditions and co-morbidity symptoms are monitored and maintained or improved  Outcome: Progressing     Problem: Nutrition  Goal: Nutrient intake appropriate for maintaining nutritional needs  Outcome: Progressing     Problem: Skin  Goal: Decreased wound size/increased tissue granulation at next dressing change  Outcome: Progressing  Goal: Participates in plan/prevention/treatment measures  Outcome: Progressing  Goal: Prevent/manage excess moisture  Outcome: Progressing  Goal: Prevent/minimize sheer/friction injuries  Outcome: Progressing  Goal: Promote/optimize nutrition  Outcome: Progressing  Goal: Promote skin healing  Outcome: Progressing     Problem: Fall/Injury  Goal: Not fall by end of shift  Outcome: Progressing  Goal: Be free from injury by end of the shift  Outcome: Progressing  Goal: Verbalize understanding of personal risk factors for fall in the hospital  Outcome: Progressing  Goal: Verbalize understanding of risk factor reduction measures to prevent injury from fall in the home  Outcome: Progressing  Goal: Use assistive devices by end of the shift  Outcome: Progressing  Goal: Pace activities to prevent fatigue by end of the shift  Outcome: Progressing     Problem: Safety - Medical Restraint  Goal: Remains free of injury from restraints (Restraint for Interference with Medical Device)  Outcome: Progressing  Goal: Free from restraint(s) (Restraint for Interference with Medical Device)  Outcome: Progressing   The patient's goals for the shift include      The clinical goals for the shift include pt's remain  within nromal range. pt remain safe/stable througout shift

## 2025-04-20 NOTE — SIGNIFICANT EVENT
Epilepsy Sign off note:     Elvis Masters is a 64 y.o. L handed  male with PMH significant for Alcohol use disorder, persistent A-fib (Eliquis), epilepsy,  KAYODE (noncompliant with CPAP), HFpEF, HTN, HLD, pre-DM, who presents as a transfer from  [facility] for uncontrollable seizures iso Alcohol withdrawal and epilepsy. Pt s/p extubation, however ongoing difficulties with regards to alcohol withdrawal and active delirium tremens. He was monitored on cvEEG with no seizures, epileptiform discharges or lateralizing signs. Encephalopathy improved and was transferred to medicine team for acute management of alcohol withdrawal.      Recommendations:  -C/w Keppra 1.5g BID  - [facility] with [facility]   -Phenobarb taper per primary team  -Ativan 2 mg IV for prolonged motor seizure lasting more than 3 minutes or a cluster of 3 or more motor seizures in an 8 hour period.    Epilepsy will sign off at this time, but please do not hesistate to re-engage with questions or concerns.

## 2025-04-21 LAB
ATRIAL RATE: 96 BPM
P OFFSET: 171 MS
P ONSET: 151 MS
Q ONSET: 227 MS
QRS COUNT: 16 BEATS
QRS DURATION: 88 MS
QT INTERVAL: 378 MS
QTC CALCULATION(BAZETT): 480 MS
QTC FREDERICIA: 443 MS
R AXIS: 191 DEGREES
T AXIS: 203 DEGREES
T OFFSET: 416 MS
VENTRICULAR RATE: 97 BPM

## 2025-04-23 LAB
ATRIAL RATE: 109 BPM
P AXIS: 53 DEGREES
PR INTERVAL: 163 MS
Q ONSET: 249 MS
QRS COUNT: 18 BEATS
QRS DURATION: 99 MS
QT INTERVAL: 387 MS
QTC CALCULATION(BAZETT): 522 MS
QTC FREDERICIA: 472 MS
R AXIS: 63 DEGREES
T AXIS: 11 DEGREES
T OFFSET: 443 MS
VENTRICULAR RATE: 109 BPM

## 2025-04-24 NOTE — DOCUMENTATION CLARIFICATION NOTE
"    PATIENT:               EMELIA GALVAN  ACCT #:                  [id number]  MRN:                       [id number]  :                       1960  ADMIT DATE:       4/15/2025 12:27 AM  DISCH DATE:        2025 8:40 AM  RESPONDING PROVIDER #:        [id number]          PROVIDER RESPONSE TEXT:    Sepsis with neurological organ dysfunction of metabolic encephalopathy    CDI QUERY TEXT:    Clarification    Instruction:    Based on your assessment of the patient and the clinical information, please provide the requested documentation by clicking on the appropriate radio button and enter any additional information if prompted.    Question: Please further clarify if a relationship exists between the Sepsis and acute organ dysfunction    When answering this query, please exercise your independent professional judgment. The fact that a question is being asked, does not imply that any particular answer is desired or expected.    The patient's clinical indicators include:  Clinical Information: ED: Patient admitted with \"seizures\"    Clinical Indicators:  VS: admission range: Temp 36.9, HR , RR 17-33, -208/, O2 sats 94-98% on n/c  4/15 Labs: Lactate 3.1  4/15: ABGs  pH 7.23, pCO2 65, pO2 94, HCO3 27.2  Creatinine range 4/15- 0.60-1.23-2.68-1.48    4/15 Significant event: \"Fever Sepsis Likely LLL Pneumonia on imaging -blood cultures, urine cultures, tracheal aspirate ordered -Vanco/Zosyn added\"  Acute metabolic encephalopathy Seizure disorder (EtOH withdrawal vs poor Keppra compliance) Acute alcohol withdrawal\"    4/15-CT head: \"No acute intracranial hemorrhage or mass effect.\"    DC summary: \"Acute hypoxemic respiratory failure secondary to pneumonia (gram positives) and acute alcohol withdrawal syndrome\" \" JARVIS\"    MAR: Vancomycin IV, Zosyn IV, Keppra IV, Rocephin IV    Treatment: Labs, monitoring, IVFs, IV antibiotics, CT head    Risk Factors: infection, elevated creatinine, confusion  Options " provided:  -- Sepsis with renal organ dysfunction of JARVIS  -- Sepsis with neurological organ dysfunction of metabolic encephalopathy  -- Sepsis with multi-system organ dysfunction of metabolic encephalopathy and JARVIS  -- Other - I will add my own diagnosis  -- Refer to Clinical Documentation Reviewer    Query created by: Ramila Richmond on 4/23/2025 12:01 PM      Electronically signed by:  RUPERT ROSALES 4/24/2025 6:02 PM

## 2025-04-29 NOTE — DOCUMENTATION CLARIFICATION NOTE
"    PATIENT:               EMELIA GALVAN  ACCT #:                  [id number]  MRN:                       [id number]  :                       1960  ADMIT DATE:       2025 9:27 AM  DISCH DATE:        2025 7:19 PM  RESPONDING PROVIDER #:        [id number]          PROVIDER RESPONSE TEXT:    Aspiration PNA    CDI QUERY TEXT:    Clarification    Instruction:    Based on your assessment of the patient and the clinical information, please provide the requested documentation by clicking on the appropriate radio button and enter any additional information if prompted.    Question: Please further specify the type of pneumonia being treated    When answering this query, please exercise your independent professional judgment. The fact that a question is being asked, does not imply that any particular answer is desired or expected.    The patient's clinical indicators include:  Clinical Information: 64 YOM who presents as a transfer from [place] for uncontrollable seizures due to alcohol withdrawal seizures and delirium tremens.    - Progress Note at 7:21 am: \"RESPIRATORY:  #Acute hypoxemic respiratory failure 2/2 PNA  #KAYODE  Assessment:  - Intubated, 50% FiO2, 10 PEEP, RR 16    ID: Afebrile. On Zosyn for MSSA PNA.\"    - Progress Note at 1:53 pm: \"Pt getting zosyn for LLL likely aspiration pneumonia; would be acceptable to switch to PO Augmentin to complete course\"    Clinical Indicators: Acute Hypoxemic Respiratory Failure, Alcohol withdrawal seizures, delirium tremens    -4/15 XR Chest 1 View: \"IMPRESSION:  Diminished inspiration with increased interstitial markings  bilaterally, likely mild pulmonary vascular congestion.  Right suprahilar and left infrahilar airspace opacities which may  indicate atelectasis or pneumonia in the appropriate clinical setting.  Indeterminate positioning of the enteric tube with the distal end  poorly visualized overlying the lower mediastinum.  If needed upper  abdominal " "radiograph could be considered to verify positioning.\"    -4/17/25 XR Chest 1 View: \"IMPRESSION:  1. Satisfactory position endotracheal tube.  2. Persistent bibasilar atelectasis or mild infiltrates.\"    -4/16/25 Respiratory Culture/Smear: (2+) Few Methicillin Susceptible Staphylococcus Aureus (MSSA)  Gram Stain: (1+) Rare Polymorphonuclear Leukocytes, (3+) Moderate Mixed Gram Positive Bacteria    Treatment: 4/17 Vancomycin 2000 mg, 4/18-4/19 Vancomycin 1500 mg, 4/17-4/20 Zosyn 4.5 g, Intubation, O2 PRN    Risk Factors: Epilepsy, ETOH Abuse, uncontrollable seizures due to alcohol withdrawal seizures and delirium tremens.  Options provided:  -- Aspiration PNA  -- MSSA PNA  -- Other - I will add my own diagnosis  -- Refer to Clinical Documentation Reviewer    Query created by: Dayanna Ware on 4/25/2025 10:46 AM      Electronically signed by:  TILA JANSEN MD 4/29/2025 7:29 AM          "

## 2025-05-06 LAB
ATRIAL RATE: 156 BPM
ATRIAL RATE: 164 BPM
P AXIS: 0 DEGREES
P AXIS: 109 DEGREES
PR INTERVAL: 136 MS
PR INTERVAL: 136 MS
Q ONSET: 225 MS
Q ONSET: 253 MS
QRS COUNT: 25 BEATS
QRS COUNT: 27 BEATS
QRS DURATION: 144 MS
QRS DURATION: 152 MS
QT INTERVAL: 292 MS
QT INTERVAL: 359 MS
QTC CALCULATION(BAZETT): 482 MS
QTC CALCULATION(BAZETT): 577 MS
QTC FREDERICIA: 408 MS
QTC FREDERICIA: 492 MS
R AXIS: 130 DEGREES
R AXIS: 99 DEGREES
T AXIS: 12 DEGREES
T AXIS: 25 DEGREES
T OFFSET: 371 MS
T OFFSET: 433 MS
VENTRICULAR RATE: 155 BPM
VENTRICULAR RATE: 164 BPM